# Patient Record
Sex: FEMALE | Race: WHITE | NOT HISPANIC OR LATINO | URBAN - METROPOLITAN AREA
[De-identification: names, ages, dates, MRNs, and addresses within clinical notes are randomized per-mention and may not be internally consistent; named-entity substitution may affect disease eponyms.]

---

## 2021-05-28 ENCOUNTER — INPATIENT (INPATIENT)
Facility: HOSPITAL | Age: 66
LOS: 11 days | Discharge: HOME | End: 2021-06-09
Attending: STUDENT IN AN ORGANIZED HEALTH CARE EDUCATION/TRAINING PROGRAM
Payer: MEDICARE

## 2021-05-28 VITALS
SYSTOLIC BLOOD PRESSURE: 145 MMHG | HEART RATE: 61 BPM | OXYGEN SATURATION: 100 % | TEMPERATURE: 98 F | DIASTOLIC BLOOD PRESSURE: 79 MMHG | RESPIRATION RATE: 18 BRPM

## 2021-05-28 LAB
ALBUMIN SERPL ELPH-MCNC: 3.2 G/DL — LOW (ref 3.3–5)
ALBUMIN SERPL ELPH-MCNC: 3.5 G/DL — SIGNIFICANT CHANGE UP (ref 3.3–5)
ALP SERPL-CCNC: 104 U/L — SIGNIFICANT CHANGE UP (ref 40–120)
ALP SERPL-CCNC: 95 U/L — SIGNIFICANT CHANGE UP (ref 40–120)
ALT FLD-CCNC: 37 U/L — HIGH (ref 4–33)
ALT FLD-CCNC: 42 U/L — HIGH (ref 4–33)
ANION GAP SERPL CALC-SCNC: 11 MMOL/L — SIGNIFICANT CHANGE UP (ref 7–14)
ANION GAP SERPL CALC-SCNC: 12 MMOL/L — SIGNIFICANT CHANGE UP (ref 7–14)
APPEARANCE UR: ABNORMAL
APTT BLD: 40.9 SEC — HIGH (ref 27–36.3)
AST SERPL-CCNC: 102 U/L — HIGH (ref 4–32)
AST SERPL-CCNC: 66 U/L — HIGH (ref 4–32)
BACTERIA # UR AUTO: ABNORMAL
BILIRUB DIRECT SERPL-MCNC: 1 MG/DL — HIGH (ref 0–0.2)
BILIRUB DIRECT SERPL-MCNC: 2.2 MG/DL — HIGH (ref 0–0.2)
BILIRUB SERPL-MCNC: 4 MG/DL — HIGH (ref 0.2–1.2)
BILIRUB SERPL-MCNC: 4.2 MG/DL — HIGH (ref 0.2–1.2)
BILIRUB UR-MCNC: ABNORMAL
BLD GP AB SCN SERPL QL: NEGATIVE — SIGNIFICANT CHANGE UP
BUN SERPL-MCNC: 6 MG/DL — LOW (ref 7–23)
BUN SERPL-MCNC: 6 MG/DL — LOW (ref 7–23)
CALCIUM SERPL-MCNC: 9 MG/DL — SIGNIFICANT CHANGE UP (ref 8.4–10.5)
CALCIUM SERPL-MCNC: 9.2 MG/DL — SIGNIFICANT CHANGE UP (ref 8.4–10.5)
CHLORIDE SERPL-SCNC: 101 MMOL/L — SIGNIFICANT CHANGE UP (ref 98–107)
CHLORIDE SERPL-SCNC: 102 MMOL/L — SIGNIFICANT CHANGE UP (ref 98–107)
CO2 SERPL-SCNC: 24 MMOL/L — SIGNIFICANT CHANGE UP (ref 22–31)
CO2 SERPL-SCNC: 26 MMOL/L — SIGNIFICANT CHANGE UP (ref 22–31)
COLOR SPEC: ABNORMAL
CREAT SERPL-MCNC: 0.56 MG/DL — SIGNIFICANT CHANGE UP (ref 0.5–1.3)
CREAT SERPL-MCNC: 0.59 MG/DL — SIGNIFICANT CHANGE UP (ref 0.5–1.3)
DIFF PNL FLD: NEGATIVE — SIGNIFICANT CHANGE UP
EPI CELLS # UR: 3 /HPF — SIGNIFICANT CHANGE UP (ref 0–5)
GLUCOSE SERPL-MCNC: 105 MG/DL — HIGH (ref 70–99)
GLUCOSE SERPL-MCNC: 109 MG/DL — HIGH (ref 70–99)
GLUCOSE UR QL: NEGATIVE — SIGNIFICANT CHANGE UP
HCT VFR BLD CALC: 40.2 % — SIGNIFICANT CHANGE UP (ref 34.5–45)
HGB BLD-MCNC: 13.6 G/DL — SIGNIFICANT CHANGE UP (ref 11.5–15.5)
HYALINE CASTS # UR AUTO: 3 /LPF — SIGNIFICANT CHANGE UP (ref 0–7)
INR BLD: 1.97 RATIO — HIGH (ref 0.88–1.16)
KETONES UR-MCNC: NEGATIVE — SIGNIFICANT CHANGE UP
LEUKOCYTE ESTERASE UR-ACNC: NEGATIVE — SIGNIFICANT CHANGE UP
MAGNESIUM SERPL-MCNC: 1.7 MG/DL — SIGNIFICANT CHANGE UP (ref 1.6–2.6)
MCHC RBC-ENTMCNC: 33.4 PG — SIGNIFICANT CHANGE UP (ref 27–34)
MCHC RBC-ENTMCNC: 33.8 GM/DL — SIGNIFICANT CHANGE UP (ref 32–36)
MCV RBC AUTO: 98.8 FL — SIGNIFICANT CHANGE UP (ref 80–100)
NITRITE UR-MCNC: NEGATIVE — SIGNIFICANT CHANGE UP
NRBC # BLD: 0 /100 WBCS — SIGNIFICANT CHANGE UP
NRBC # FLD: 0 K/UL — SIGNIFICANT CHANGE UP
PH UR: 6.5 — SIGNIFICANT CHANGE UP (ref 5–8)
PHOSPHATE SERPL-MCNC: 2.4 MG/DL — LOW (ref 2.5–4.5)
PLATELET # BLD AUTO: 151 K/UL — SIGNIFICANT CHANGE UP (ref 150–400)
POTASSIUM SERPL-MCNC: 3.3 MMOL/L — LOW (ref 3.5–5.3)
POTASSIUM SERPL-MCNC: 4.9 MMOL/L — SIGNIFICANT CHANGE UP (ref 3.5–5.3)
POTASSIUM SERPL-SCNC: 3.3 MMOL/L — LOW (ref 3.5–5.3)
POTASSIUM SERPL-SCNC: 4.9 MMOL/L — SIGNIFICANT CHANGE UP (ref 3.5–5.3)
PROT SERPL-MCNC: 7.5 G/DL — SIGNIFICANT CHANGE UP (ref 6–8.3)
PROT SERPL-MCNC: 8.3 G/DL — SIGNIFICANT CHANGE UP (ref 6–8.3)
PROT UR-MCNC: ABNORMAL
PROTHROM AB SERPL-ACNC: 21.9 SEC — HIGH (ref 10.6–13.6)
RBC # BLD: 4.07 M/UL — SIGNIFICANT CHANGE UP (ref 3.8–5.2)
RBC # FLD: 14.9 % — HIGH (ref 10.3–14.5)
RBC CASTS # UR COMP ASSIST: 3 /HPF — SIGNIFICANT CHANGE UP (ref 0–4)
RH IG SCN BLD-IMP: POSITIVE — SIGNIFICANT CHANGE UP
SODIUM SERPL-SCNC: 137 MMOL/L — SIGNIFICANT CHANGE UP (ref 135–145)
SODIUM SERPL-SCNC: 139 MMOL/L — SIGNIFICANT CHANGE UP (ref 135–145)
SP GR SPEC: 1.02 — SIGNIFICANT CHANGE UP (ref 1.01–1.02)
UROBILINOGEN FLD QL: ABNORMAL
WBC # BLD: 6.37 K/UL — SIGNIFICANT CHANGE UP (ref 3.8–10.5)
WBC # FLD AUTO: 6.37 K/UL — SIGNIFICANT CHANGE UP (ref 3.8–10.5)
WBC UR QL: 3 /HPF — SIGNIFICANT CHANGE UP (ref 0–5)

## 2021-05-28 PROCEDURE — 99285 EMERGENCY DEPT VISIT HI MDM: CPT

## 2021-05-28 PROCEDURE — 70496 CT ANGIOGRAPHY HEAD: CPT | Mod: 26

## 2021-05-28 PROCEDURE — 74177 CT ABD & PELVIS W/CONTRAST: CPT | Mod: 26

## 2021-05-28 PROCEDURE — 70498 CT ANGIOGRAPHY NECK: CPT | Mod: 26

## 2021-05-28 PROCEDURE — 71260 CT THORAX DX C+: CPT | Mod: 26

## 2021-05-28 PROCEDURE — 70450 CT HEAD/BRAIN W/O DYE: CPT | Mod: 26,59

## 2021-05-28 RX ORDER — LEVETIRACETAM 250 MG/1
1000 TABLET, FILM COATED ORAL ONCE
Refills: 0 | Status: COMPLETED | OUTPATIENT
Start: 2021-05-28 | End: 2021-05-28

## 2021-05-28 RX ADMIN — LEVETIRACETAM 1000 MILLIGRAM(S): 250 TABLET, FILM COATED ORAL at 20:57

## 2021-05-28 NOTE — ED PROVIDER NOTE - ATTENDING CONTRIBUTION TO CARE
margaux: pt presents with loss of vision in her right eye about 2 weeks ago, weight loss 30lbs recently, today with some ha and feels "eyes are yellow". pt denies pain in abd, nausea or vomiting.   pt awake, alert appropriate. not even seeing fingers from the right eye. abd soft.  ct shows head bleed, concern for mets, ct abd pending.  neuro involved.  will require admission.    Upon my evaluation, this patient had a high probability of imminent or life-threatening deterioration due to head bleed which required my direct attention, intervention, and personal management.  The patient has a  medical condition that impairs one or more vital organ systems.  Frequent personal assessment and adjustment of medical interventions was performed.      I have personally provided 34  minutes of critical care time exclusive of time spent on separately billable procedures. Time includes review of laboratory data, radiology results, discussion with consultants, patient and family; monitoring for potential decompensation, as well as time spent retrieving data and reviewing the chart and documenting the visit. Interventions were performed as documented above.    I analyzed the monitor at least 2 different times greater than 10 minutes apart and the rhythm was unchanged and o2 sat >92.    I performed a history and physical exam of the patient and discussed their management with the resident and /or advanced care provider. I reviewed the resident and /or ACP's note and agree with the documented findings and plan of care. My medical decison making and observations are found above.

## 2021-05-28 NOTE — ED PROVIDER NOTE - CLINICAL SUMMARY MEDICAL DECISION MAKING FREE TEXT BOX
margaux: pt presents with loss of vision in her right eye about 2 weeks ago, weight loss 30lbs recently, today with some ha and feels "eyes are yellow". pt denies pain in abd, nausea or vomiting.   pt awake, alert appropriate. not even seeing fingers from the right eye. abd soft.  ct shows head bleed, concern for mets, ct abd pending.  neuro involved.  will require admission.    Upon my evaluation, this patient had a high probability of imminent or life-threatening deterioration due to head bleed which required my direct attention, intervention, and personal management.  The patient has a  medical condition that impairs one or more vital organ systems.  Frequent personal assessment and adjustment of medical interventions was performed.      I have personally provided 34  minutes of critical care time exclusive of time spent on separately billable procedures. Time includes review of laboratory data, radiology results, discussion with consultants, patient and family; monitoring for potential decompensation, as well as time spent retrieving data and reviewing the chart and documenting the visit. Interventions were performed as documented above.    I analyzed the monitor at least 2 different times greater than 10 minutes apart and the rhythm was unchanged and o2 sat >92.

## 2021-05-28 NOTE — ED ADULT NURSE NOTE - OBJECTIVE STATEMENT
Pt awake and alert x 4 co weight loss over last few months with skin discoloration. pt s urine dark ralph ic placed blood and urine collected. pt denies any sob or chest pain. Pt also denies abdominal pain awaiting for dispo.

## 2021-05-28 NOTE — ED PROVIDER NOTE - PROGRESS NOTE DETAILS
Juan, PGY3- sign out from day team. Concern for new GI malignancy with mets to brain (call from rads, hemorrhage from possible mass). Neuro exam at this time reassuring, non focal. Pt and dghter made aware of prelim findings. NSG aware, will eval. Juan, PGY3- NSG reviewed imaging. Feel pt needs Q1 neuro checks. Dr. Han accepting to MICU given pt will need medical workup for possible metastatic malignancy. Family aware of plan.

## 2021-05-28 NOTE — ED PROVIDER NOTE - CARE PLAN
Principal Discharge DX:	Brain bleed   Principal Discharge DX:	Intracranial hemorrhage  Secondary Diagnosis:	Liver mass

## 2021-05-28 NOTE — ED ADULT TRIAGE NOTE - CHIEF COMPLAINT QUOTE
pt coming with lose of weight x 3 months with some/HA/weakness/sclera discoloration.    denies CP, no dysuria, no slur speech, no facial numbness, upper and lower ext. equal strength.

## 2021-05-28 NOTE — ED PROVIDER NOTE - PHYSICAL EXAMINATION
Gen: tired appearing  HEENT: EOMI, no nasal discharge, mucous membranes moist, patient with diminished visual field of R eye, difficulty with both peripheral and central vision. No issues with L. Sclera are discolored and yellow tinged.  CV: RRR, +S1/S2, no M/R/G  Resp: CTAB, no W/R/R  GI: Abdomen soft non-distended, NTTP, no masses  MSK: No open wounds, no bruising, no LE edema, noticeable loose skin on arms and legs  Neuro: A&Ox4, following commands, moving all four extremities spontaneously, strength 5/5 in all 4 extremities  Psych: appropriate mood, denies AH, VH, SI Gen: tired appearing  HEENT: EOMI, no nasal discharge, mucous membranes moist, patient with diminished visual field of R eye, difficulty with both peripheral and central vision. No issues with L. Sclera are discolored/yellow tinged.  CV: RRR, +S1/S2, no M/R/G  Resp: CTAB, no W/R/R  GI: Abdomen soft non-distended, NTTP, no masses  MSK: No open wounds, no bruising, no LE edema, noticeable loose skin on arms and legs  Neuro: A&Ox4, following commands, moving all four extremities spontaneously, strength 5/5 in all 4 extremities  Psych: appropriate mood, denies AH, VH, SI

## 2021-05-28 NOTE — ED PROVIDER NOTE - OBJECTIVE STATEMENT
65F no PMH Sclera in eyes changing color from white 2 weeks ago to yellow now, lost vision 2 weeks ago now back , pressure in head like slight Ha last yesterday, stomach pain diffusely, sweating profusely last night, rapid weight loss from 3 months despite normal appetite till now daughter estimates about 20 lbs loss. No  n/v/d/f/c. No hematuria, dysuria, hematochezia. Daughter endorses new onset generalized weakness and needing some assistance walking that was not present 2 weeks ago. 65F no PMH. As per daughter, sclera in eyes changing color from white 2 weeks ago to yellow now, lost vision 2 weeks ago briefly, now returned.  The patient reports a pressure headache that lasts several minutes, last felt yesterday, stomach pain diffusely that comes and goes, and sweating profusely last night. She reports rapid weight loss from 3 months despite normal appetite daughter estimates about 20-30 lbs loss. No  n/v/d/f/c. No hematuria, dysuria, hematochezia. Daughter endorses new onset generalized weakness and needing some assistance walking that was not present 2 weeks ago.

## 2021-05-28 NOTE — ED ADULT NURSE REASSESSMENT NOTE - NS ED NURSE REASSESS COMMENT FT1
Pt A&Ox4, resting in stretcher. pt denies cp, sob, abd pain, ha, dizziness, n/v/d fevers/chills. Respirations even and unlabored, no accessory muscle use. Pt awaiting ct abd

## 2021-05-29 DIAGNOSIS — Z02.9 ENCOUNTER FOR ADMINISTRATIVE EXAMINATIONS, UNSPECIFIED: ICD-10-CM

## 2021-05-29 DIAGNOSIS — I62.9 NONTRAUMATIC INTRACRANIAL HEMORRHAGE, UNSPECIFIED: ICD-10-CM

## 2021-05-29 DIAGNOSIS — F10.10 ALCOHOL ABUSE, UNCOMPLICATED: ICD-10-CM

## 2021-05-29 DIAGNOSIS — Z29.9 ENCOUNTER FOR PROPHYLACTIC MEASURES, UNSPECIFIED: ICD-10-CM

## 2021-05-29 DIAGNOSIS — R16.0 HEPATOMEGALY, NOT ELSEWHERE CLASSIFIED: ICD-10-CM

## 2021-05-29 LAB
ALBUMIN SERPL ELPH-MCNC: 3.2 G/DL — LOW (ref 3.3–5)
ALP SERPL-CCNC: 95 U/L — SIGNIFICANT CHANGE UP (ref 40–120)
ALT FLD-CCNC: 32 U/L — SIGNIFICANT CHANGE UP (ref 4–33)
ANION GAP SERPL CALC-SCNC: 14 MMOL/L — SIGNIFICANT CHANGE UP (ref 7–14)
AST SERPL-CCNC: 58 U/L — HIGH (ref 4–32)
BASOPHILS # BLD AUTO: 0.03 K/UL — SIGNIFICANT CHANGE UP (ref 0–0.2)
BASOPHILS NFR BLD AUTO: 0.5 % — SIGNIFICANT CHANGE UP (ref 0–2)
BASOPHILS NFR BLD AUTO: 1 % — SIGNIFICANT CHANGE UP (ref 0–2)
BILIRUB DIRECT SERPL-MCNC: 2.3 MG/DL — HIGH (ref 0–0.2)
BILIRUB SERPL-MCNC: 4.3 MG/DL — HIGH (ref 0.2–1.2)
BUN SERPL-MCNC: 5 MG/DL — LOW (ref 7–23)
CALCIUM SERPL-MCNC: 8.9 MG/DL — SIGNIFICANT CHANGE UP (ref 8.4–10.5)
CHLORIDE SERPL-SCNC: 102 MMOL/L — SIGNIFICANT CHANGE UP (ref 98–107)
CO2 SERPL-SCNC: 22 MMOL/L — SIGNIFICANT CHANGE UP (ref 22–31)
CREAT SERPL-MCNC: 0.59 MG/DL — SIGNIFICANT CHANGE UP (ref 0.5–1.3)
EOSINOPHIL # BLD AUTO: 0.13 K/UL — SIGNIFICANT CHANGE UP (ref 0–0.5)
EOSINOPHIL NFR BLD AUTO: 2 % — SIGNIFICANT CHANGE UP (ref 0–6)
EOSINOPHIL NFR BLD AUTO: 2.1 % — SIGNIFICANT CHANGE UP (ref 0–6)
GLUCOSE SERPL-MCNC: 119 MG/DL — HIGH (ref 70–99)
HCT VFR BLD CALC: 39.9 % — SIGNIFICANT CHANGE UP (ref 34.5–45)
HGB BLD-MCNC: 13.7 G/DL — SIGNIFICANT CHANGE UP (ref 11.5–15.5)
IANC: 3.65 K/UL — SIGNIFICANT CHANGE UP (ref 1.5–8.5)
IMM GRANULOCYTES NFR BLD AUTO: 0.3 % — SIGNIFICANT CHANGE UP (ref 0–1.5)
INR BLD: 1.88 RATIO — HIGH (ref 0.88–1.16)
LYMPHOCYTES # BLD AUTO: 1.74 K/UL — SIGNIFICANT CHANGE UP (ref 1–3.3)
LYMPHOCYTES # BLD AUTO: 28 % — SIGNIFICANT CHANGE UP (ref 13–44)
LYMPHOCYTES # BLD AUTO: 28.3 % — SIGNIFICANT CHANGE UP (ref 13–44)
MACROCYTES BLD QL: SLIGHT — SIGNIFICANT CHANGE UP
MAGNESIUM SERPL-MCNC: 1.7 MG/DL — SIGNIFICANT CHANGE UP (ref 1.6–2.6)
MCHC RBC-ENTMCNC: 33.2 PG — SIGNIFICANT CHANGE UP (ref 27–34)
MCHC RBC-ENTMCNC: 34.3 GM/DL — SIGNIFICANT CHANGE UP (ref 32–36)
MCV RBC AUTO: 96.6 FL — SIGNIFICANT CHANGE UP (ref 80–100)
MONOCYTES # BLD AUTO: 0.58 K/UL — SIGNIFICANT CHANGE UP (ref 0–0.9)
MONOCYTES NFR BLD AUTO: 9 % — SIGNIFICANT CHANGE UP (ref 2–14)
MONOCYTES NFR BLD AUTO: 9.4 % — SIGNIFICANT CHANGE UP (ref 2–14)
NEUTROPHILS # BLD AUTO: 3.65 K/UL — SIGNIFICANT CHANGE UP (ref 1.8–7.4)
NEUTROPHILS NFR BLD AUTO: 59 % — SIGNIFICANT CHANGE UP (ref 43–77)
NEUTROPHILS NFR BLD AUTO: 59.4 % — SIGNIFICANT CHANGE UP (ref 43–77)
NRBC # BLD: 0 /100 WBCS — SIGNIFICANT CHANGE UP
NRBC # FLD: 0 K/UL — SIGNIFICANT CHANGE UP
PHOSPHATE SERPL-MCNC: 2.8 MG/DL — SIGNIFICANT CHANGE UP (ref 2.5–4.5)
PLAT MORPH BLD: NORMAL — SIGNIFICANT CHANGE UP
PLATELET # BLD AUTO: 145 K/UL — LOW (ref 150–400)
PLATELET CLUMP BLD QL SMEAR: ABNORMAL
POTASSIUM SERPL-MCNC: 3 MMOL/L — LOW (ref 3.5–5.3)
POTASSIUM SERPL-SCNC: 3 MMOL/L — LOW (ref 3.5–5.3)
PROT SERPL-MCNC: 7.2 G/DL — SIGNIFICANT CHANGE UP (ref 6–8.3)
PROTHROM AB SERPL-ACNC: 20.8 SEC — HIGH (ref 10.6–13.6)
RBC # BLD: 4.13 M/UL — SIGNIFICANT CHANGE UP (ref 3.8–5.2)
RBC # FLD: 14.8 % — HIGH (ref 10.3–14.5)
RBC BLD AUTO: NORMAL — SIGNIFICANT CHANGE UP
RH IG SCN BLD-IMP: POSITIVE — SIGNIFICANT CHANGE UP
SARS-COV-2 RNA SPEC QL NAA+PROBE: SIGNIFICANT CHANGE UP
SODIUM SERPL-SCNC: 138 MMOL/L — SIGNIFICANT CHANGE UP (ref 135–145)
VARIANT LYMPHS # BLD: 1 % — SIGNIFICANT CHANGE UP (ref 0–6)
WBC # BLD: 6.15 K/UL — SIGNIFICANT CHANGE UP (ref 3.8–10.5)
WBC # FLD AUTO: 6.15 K/UL — SIGNIFICANT CHANGE UP (ref 3.8–10.5)

## 2021-05-29 PROCEDURE — 99233 SBSQ HOSP IP/OBS HIGH 50: CPT | Mod: GC

## 2021-05-29 PROCEDURE — 70450 CT HEAD/BRAIN W/O DYE: CPT | Mod: 26

## 2021-05-29 RX ORDER — CHLORHEXIDINE GLUCONATE 213 G/1000ML
1 SOLUTION TOPICAL
Refills: 0 | Status: DISCONTINUED | OUTPATIENT
Start: 2021-05-29 | End: 2021-05-29

## 2021-05-29 RX ORDER — LEVETIRACETAM 250 MG/1
500 TABLET, FILM COATED ORAL
Refills: 0 | Status: DISCONTINUED | OUTPATIENT
Start: 2021-05-29 | End: 2021-05-31

## 2021-05-29 RX ORDER — POTASSIUM CHLORIDE 20 MEQ
40 PACKET (EA) ORAL EVERY 4 HOURS
Refills: 0 | Status: COMPLETED | OUTPATIENT
Start: 2021-05-29 | End: 2021-05-29

## 2021-05-29 RX ORDER — MAGNESIUM SULFATE 500 MG/ML
2 VIAL (ML) INJECTION ONCE
Refills: 0 | Status: COMPLETED | OUTPATIENT
Start: 2021-05-29 | End: 2021-05-29

## 2021-05-29 RX ORDER — FOLIC ACID 0.8 MG
1 TABLET ORAL DAILY
Refills: 0 | Status: DISCONTINUED | OUTPATIENT
Start: 2021-05-29 | End: 2021-06-09

## 2021-05-29 RX ORDER — PHYTONADIONE (VIT K1) 5 MG
10 TABLET ORAL ONCE
Refills: 0 | Status: COMPLETED | OUTPATIENT
Start: 2021-05-29 | End: 2021-05-29

## 2021-05-29 RX ORDER — THIAMINE MONONITRATE (VIT B1) 100 MG
100 TABLET ORAL DAILY
Refills: 0 | Status: COMPLETED | OUTPATIENT
Start: 2021-05-29 | End: 2021-06-01

## 2021-05-29 RX ADMIN — CHLORHEXIDINE GLUCONATE 1 APPLICATION(S): 213 SOLUTION TOPICAL at 08:04

## 2021-05-29 RX ADMIN — Medication 102 MILLIGRAM(S): at 04:48

## 2021-05-29 RX ADMIN — Medication 100 MILLIGRAM(S): at 17:40

## 2021-05-29 RX ADMIN — Medication 1 TABLET(S): at 17:39

## 2021-05-29 RX ADMIN — LEVETIRACETAM 500 MILLIGRAM(S): 250 TABLET, FILM COATED ORAL at 06:16

## 2021-05-29 RX ADMIN — LEVETIRACETAM 500 MILLIGRAM(S): 250 TABLET, FILM COATED ORAL at 17:39

## 2021-05-29 RX ADMIN — Medication 1 MILLIGRAM(S): at 17:39

## 2021-05-29 RX ADMIN — Medication 40 MILLIEQUIVALENT(S): at 05:45

## 2021-05-29 RX ADMIN — Medication 50 GRAM(S): at 05:45

## 2021-05-29 RX ADMIN — Medication 40 MILLIEQUIVALENT(S): at 10:26

## 2021-05-29 NOTE — H&P ADULT - NSHPREVIEWOFSYSTEMS_GEN_ALL_CORE
Constitutional: denies fevers, chills, +night sweats, +weight loss, +generalized weakness   HEENT: +decreased/blurry vision, +sclera yellowing   Cardiovascular: denies palpitations, chest pain, edema  Respiratory: denies SOB, wheezing  Gastrointestinal: denies N/V/D, hematochezia, melena, +abdominal pain  : denies dysuria, hematuria  MSK: denies weakness, joint pain  Neuro: no numbness or tingling

## 2021-05-29 NOTE — PROGRESS NOTE ADULT - PROBLEM SELECTOR PLAN 3
Empirically on CIWA: patient denied heavy ETOH use, but daughter reports patient is drinking heavily.

## 2021-05-29 NOTE — PROGRESS NOTE ADULT - ASSESSMENT
65F with no known PMH who presented to the ED with headache and vision changes. Patient found to have an intraparenchymal hemorrhage on CTH and indeterminate infiltrative liver mass concerning for metastatic neoplasm.      Neuro  #Patient with 2.4 x 3.0 x 2.4 cm left occipital acute intraparenchymal hemorrhage with associated vasogenic edema and mass effect, no midline shift. CTA head/neck 5 hrs later with unchanged left occipital acute intraparenchymal hemorrhage. No active bleeding  - Patient evaluated by neurosurgery, recommended Q1hr neuro checks, no surgical intervention at this time   - INR 2 (likely iso liver dysfunction), started on vitamin K  - s/p Keppra 1g in the ED; c/w Keppra 500mg BID  - Repeat CTH 10-12 hrs after original   - neuro consult  - MRI brain wwo C for stroke workup and rule out underlying lesion    Cards/Vasc  -No active issues  -monitor BP     Resp  -No active issues    GI  #CTAP with Indeterminate infiltrative liver mass; concerning for malignancy  - h/o alcohol use  - Liver dysfunction with INR 2, albumin 3.2 and mildly elevated Tbili (4)  - obtain MRI for further eval  - will likely need liver biopsy    Renal/  -Cr 0.56 on admission.    Endo  -no issues    ID  -COVID neg    Heme/Onc  -Hgb 13.6 on admission    Nutrition:  Regular  DVT ppx: SCD  GI ppx: n/a  Dispo: Observe in MICU  65F with no known PMH who presented to the ED with headache and vision changes. Patient found to have an intraparenchymal hemorrhage on CTH and indeterminate infiltrative liver mass concerning for metastatic neoplasm. Admitted for Q1h neuro check.       Neuro  #Patient with 2.4 x 3.0 x 2.4 cm left occipital acute intraparenchymal hemorrhage with associated vasogenic edema and mass effect, no midline shift. CTA head/neck 5 hrs later with unchanged left occipital acute intraparenchymal hemorrhage. No active bleeding  - Patient evaluated by neurosurgery, recommended Q1hr neuro checks, no surgical intervention at this time   - INR ~2 (likely iso liver dysfunction), started on vitamin K  - s/p Keppra 1g in the ED; c/w Keppra 500mg BID  - Repeat CTH stable in 9AM  - Neuro following, will determine the need for steroid after reviewing repeat CTH  - MRI brain wwo C for stroke workup and rule out underlying lesion    Cards/Vasc  -No active issues  -monitor BP     Resp  -No active issues    GI  #CTAP with Indeterminate infiltrative liver mass; concerning for malignancy  - h/o alcohol use, no signs of withdrawal currently  - Liver dysfunction with INR 2, albumin 3.2 and mildly elevated Tbili (4)  - obtain MRI for further eval  - will likely need liver biopsy  - Diet as tolerated    Renal/  - Cr 0.56 on admission.  - Monitor UOP    Endo  -no issues    ID  - COVID neg  - Observe off abx    Heme/Onc  - Hgb 13.6 on admission  - Cancer hx: no personal hx of cancers; 2 sisters passed with gastric cancer, daughter has breast cancer  - Cancer screening:   -- Mammo and US wnl last year, but has had multiple workup/biopsies in the past for abnormal findings on mammo (all benign findings);   -- Last colonoscopy 3 years ago, normal finding per report  -- Never had EGD  -- PAP smear last year, reportedly normal  - CT revealed pulmonary nodules, liver mass, and possible hemorrhagic brain mets; Will need malignancy workup.    Nutrition:  Regular  DVT ppx: SCD  GI ppx: n/a  Dispo: BB to medicine if repeat CTH stable

## 2021-05-29 NOTE — CONSULT NOTE ADULT - SUBJECTIVE AND OBJECTIVE BOX
MRN-[id number]  Patient is a 65y old  Female who presents with a chief complaint of Intraparenchymal Hemorrhage ICH (29 May 2021 02:14)    HPI:  Patient is a 66yo RT handed with no known pmh presents to [facility] with headaches and vision changes. Per chart review, patient was noted to have decreased vision and also noted to have blurred vision around 2 weeks ago. Of note, patient's vision improved over the course of the 2 weeks, however still endorses some blurred vision. Patient's sclera was noted to change from white to yellow. Patient stated having headaches for the past 5-7 days, mainly in the frontal and 5-10 in intensity.    Patient also reported having diffuse stomach pains. Of note she reported having a 20-30lb weight loss in the past 3 months; more rapidly in the past few weeks. Also with generalized weakness, needing assistance with ambulation intermittently for the past two weeks. Denied fevers, chills but endorsed night sweats. Also denied nausea, vomiting, diarrhea, constipation.     PAST MEDICAL & SURGICAL HISTORY:  No pertinent past medical history    No significant past surgical history      FAMILY HISTORY:  No pertinent family history in first degree relatives      Social Hx:  Nonsmoker, no drug or alcohol use    Home Medications:    MEDICATIONS  (STANDING):  chlorhexidine 4% Liquid 1 Application(s) Topical <User Schedule>  levETIRAcetam 500 milliGRAM(s) Oral two times a day  phytonadione  IVPB 10 milliGRAM(s) IV Intermittent once    MEDICATIONS  (PRN):    Allergies  Allergy Status Unknown    Intolerances      REVIEW OF SYSTEMS  General:	  Skin/Breast:	  Ophthalmologic:  ENMT:	  Respiratory and Thorax:	  Cardiovascular:	  Gastrointestinal:	  Genitourinary:	  Musculoskeletal:	  Neurological:	  	    ROS: Pertinent positives in HPI, all other ROS were reviewed and are negative.      Vital Signs Last 24 Hrs  T(C): 36.8 (29 May 2021 02:43), Max: 37.1 (29 May 2021 02:10)  T(F): 98.2 (29 May 2021 02:43), Max: 98.8 (29 May 2021 02:10)  HR: 73 (29 May 2021 04:00) (61 - 75)  BP: 116/55 (29 May 2021 04:00) (116/55 - 145/79)  BP(mean): 74 (29 May 2021 04:00) (74 - 96)  RR: 19 (29 May 2021 04:00) (17 - 19)  SpO2: 97% (29 May 2021 04:00) (95% - 100%)    GENERAL EXAM:  Constitutional: awake and alert. NAD  HEENT: PERRL, EOMI      NEUROLOGICAL EXAM:  MS: AAOX3, speech is fluent, follows simple and complex commands.CN: VFF, EOMI, PERRL, no SELVIN, no APD,  V1-3 intact, no facial asymmetry, t/p midline, SCM/trap intact.  Motor: Strength: 5/5 4x. Tone: normal.   Bulk: normal. DTR 2+ symm.    Plantar flex b/l.   Sensation: intact to LT/PP/Vibration/Position/Temperature 4x.   Coordination: intact 4x.   Gait:  Romberg negative, pull test negative; walks with narrow base, pivots in 2 steps.    NIHSS  mRS    Labs:   cbc                      13.7   6.15  )-----------( 145      ( 29 May 2021 04:24 )             39.9     [id number]    139  |  102  |  6<L>  ----------------------------<  109<H>  3.3<L>   |  26  |  0.59    Ca    9.0      28 May 2021 19:45  Phos  2.4       Mg     1.7         TPro  7.5  /  Alb  3.2<L>  /  TBili  4.2<H>  /  DBili  2.2<H>  /  AST  66<H>  /  ALT  37<H>  /  AlkPhos  104      CoagsPT/INR - ( 28 May 2021 21:00 )   PT: 21.9 sec;   INR: 1.97 ratio         PTT - ( 28 May 2021 21:00 )  PTT:40.9 sec  Lipids  A1C  CardiacMarkers    LFTsLIVER FUNCTIONS - ( 28 May 2021 19:45 )  Alb: 3.2 g/dL / Pro: 7.5 g/dL / ALK PHOS: 104 U/L / ALT: 37 U/L / AST: 66 U/L / GGT: x           UAUrinalysis Basic - ( 28 May 2021 17:53 )    Color: Terese / Appearance: Slightly Turbid / S.023 / pH: x  Gluc: x / Ketone: Negative  / Bili: Small / Urobili: 12 mg/dL   Blood: x / Protein: 30 mg/dL / Nitrite: Negative   Leuk Esterase: Negative / RBC: 3 /HPF / WBC 3 /HPF   Sq Epi: x / Non Sq Epi: 3 /HPF / Bacteria: Few      Radiology:  CT head w/o contrast (20:00): IMPRESSION:   2.4 x 3.0 x 2.4 cm left occipital acute intraparenchymal hemorrhage with associated vasogenic edema and mass effect as described. There is no midline shift.    Repeat CT head w/o contrast ():   CT brain: Unchanged left occipital acute intraparenchymal hemorrhage. No active bleeding.    CT angiography neck: No hemodynamically significant stenosis by NASCET criteria. No vascular dissection.    CT angiography brain: No major vessel occlusion or proximal stenosis. No aneurysm or vascular malformation.   MRN-[id number]  Patient is a 65y old  Female who presents with a chief complaint of Intraparenchymal Hemorrhage ICH (29 May 2021 02:14)    HPI:  Patient is a 64yo RT handed with no known pmh presents to [facility] with headaches and vision changes. Per chart review, patient was noted to have decreased vision and also noted to have blurred vision around 2 weeks ago. Of note, patient's vision improved over the course of the 2 weeks, however still endorses some blurred vision. Patient's sclera was noted to change from white to yellow. Patient stated having headaches for the past 5-7 days, mainly in the frontal and 5-10 in intensity. Patient also reported having diffuse stomach pains. Of note she reported having a 20-30lb weight loss in the past 3 months; more rapidly in the past few weeks. Also with generalized weakness, needing assistance with ambulation intermittently for the past two weeks. Patient denies headaches, numbness, tingling, fevers, chills, nausea, vomiting, diarrhea, constipation.  Mentions night sweats and fatigue.     PAST MEDICAL & SURGICAL HISTORY:  No pertinent past medical history    No significant past surgical history      FAMILY HISTORY:  No pertinent family history in first degree relatives      Social Hx:  Nonsmoker, no drug or alcohol use    Home Medications:    MEDICATIONS  (STANDING):  chlorhexidine 4% Liquid 1 Application(s) Topical <User Schedule>  levETIRAcetam 500 milliGRAM(s) Oral two times a day  phytonadione  IVPB 10 milliGRAM(s) IV Intermittent once    MEDICATIONS  (PRN):    Allergies  Allergy Status Unknown    Intolerances      REVIEW OF SYSTEMS  General: Denies fever and chilla. mentions Night sweats and fatigue 	  Ophthalmologic: pupils symmetric b/l. EOMI. yellow sclera noted b/l. 	  Gastrointestinal:	Denies n,v   Musculoskeletal:	Denies weakness  Neurological:	Denies headaches now.   	    ROS: Pertinent positives in HPI, all other ROS were reviewed and are negative.      Vital Signs Last 24 Hrs  T(C): 36.8 (29 May 2021 02:43), Max: 37.1 (29 May 2021 02:10)  T(F): 98.2 (29 May 2021 02:43), Max: 98.8 (29 May 2021 02:10)  HR: 73 (29 May 2021 04:00) (61 - 75)  BP: 116/55 (29 May 2021 04:00) (116/55 - 145/79)  BP(mean): 74 (29 May 2021 04:00) (74 - 96)  RR: 19 (29 May 2021 04:00) (17 - 19)  SpO2: 97% (29 May 2021 04:00) (95% - 100%)    GENERAL EXAM:  Constitutional: awake and alert. NAD  HEENT: PERRL, EOMI      NEUROLOGICAL EXAM:  MS: AAOX3, speech is fluent, follows simple and complex commands. No aphasia. No dysarthria noted. Extinction wnl.   CN: VFF are intact patient was able to appreciate all quadrants, EOMI, PERRL, pupils symmetric b/l   V1-3 intact, no facial asymmetry, t/p midline,   Motor: Strength: 5/5 in the UE ane LE b/l.   Tone: normal.   Bulk: normal.   DTR 2+ symm.  in biceps/triceps. +1 noted in patellar b/l.   Plantar flex b/l.   Sensation: intact to LT/Temperature 4x.   Coordination: FTN intact b/l. Heel to shin intact b/l.   Gait:  deferred   Babinski negative b/l.     NIHSS 0  mRS 0    Labs:   cbc                      13.7   6.15  )-----------( 145      ( 29 May 2021 04:24 )             39.9     [id number]    139  |  102  |  6<L>  ----------------------------<  109<H>  3.3<L>   |  26  |  0.59    Ca    9.0      28 May 2021 19:45  Phos  2.4     05-  Mg     1.7     -    TPro  7.5  /  Alb  3.2<L>  /  TBili  4.2<H>  /  DBili  2.2<H>  /  AST  66<H>  /  ALT  37<H>  /  AlkPhos  104  05-28    CoagsPT/INR - ( 28 May 2021 21:00 )   PT: 21.9 sec;   INR: 1.97 ratio         PTT - ( 28 May 2021 21:00 )  PTT:40.9 sec  Lipids  A1C  CardiacMarkers    LFTsLIVER FUNCTIONS - ( 28 May 2021 19:45 )  Alb: 3.2 g/dL / Pro: 7.5 g/dL / ALK PHOS: 104 U/L / ALT: 37 U/L / AST: 66 U/L / GGT: x           UAUrinalysis Basic - ( 28 May 2021 17:53 )    Color: Terese / Appearance: Slightly Turbid / S.023 / pH: x  Gluc: x / Ketone: Negative  / Bili: Small / Urobili: 12 mg/dL   Blood: x / Protein: 30 mg/dL / Nitrite: Negative   Leuk Esterase: Negative / RBC: 3 /HPF / WBC 3 /HPF   Sq Epi: x / Non Sq Epi: 3 /HPF / Bacteria: Few      Radiology:  CT head w/o contrast (20:00): IMPRESSION:   2.4 x 3.0 x 2.4 cm left occipital acute intraparenchymal hemorrhage with associated vasogenic edema and mass effect as described. There is no midline shift.    Repeat CT head w/o contrast ():   CT brain: Unchanged left occipital acute intraparenchymal hemorrhage. No active bleeding.    CT angiography neck: No hemodynamically significant stenosis by NASCET criteria. No vascular dissection.    CT angiography brain: No major vessel occlusion or proximal stenosis. No aneurysm or vascular malformation.

## 2021-05-29 NOTE — CONSULT NOTE ADULT - ASSESSMENT
Patient is a 66yo RT handed with no known pmh presents to [facility] with headaches and vision changes. Per chart review, patient was noted to have decreased vision and also noted to have blurred vision around 2 weeks ago. Of note, patient's vision improved over the course of the 2 weeks, however still endorses some blurred vision. Patient's sclera was noted to change from white to yellow. Patient stated having headaches for the past 5-7 days, mainly in the frontal and 5-10 in intensity.    Patient also reported having diffuse stomach pains. Of note she reported having a 20-30lb weight loss in the past 3 months; more rapidly in the past few weeks. Patient will need further imaging with CT head in the AM for stability and will need Brain MRI w/wo contrast to assess for any possibility of mets. May consider heme/onc consult.       Impression/plan:   Acute Left sided IPH etiology unknown in the setting of active weight loss concerning for possible mets   Recommendations:   Keep SBP less than 160   repeat CT head w/o contrast in the AM for stability   Brain MRI w/wo contrast to assess for possible mets given clinical history   Continue Keppra 500mg Q12hr per NSG  rEEG   started on vitamin K for reversal   Keep Na 140-150   Neuro checks Q1hr   Telemetry   echo   NsG following   Could consider Heme/onc consult   No AC/AP till stability scan   mechanical DVT ppx     Case to be discussed with Neurology Attending.  Patient is a 64yo RT handed with no known pmh presents to [facility] with headaches and vision changes. Per chart review, patient was noted to have decreased vision and also noted to have blurred vision around 2 weeks ago. Of note, patient's vision improved over the course of the 2 weeks, however still endorses some blurred vision. Patient's sclera was noted to change from white to yellow. Patient stated having headaches for the past 5-7 days, mainly in the frontal and 5-10 in intensity.    Patient also reported having diffuse stomach pains. Of note she reported having a 20-30lb weight loss in the past 3 months; more rapidly in the past few weeks. Patient will need further imaging with CT head in the AM for stability and will need Brain MRI w/wo contrast to assess for any possibility of mets. May consider heme/onc consult.       Impression/plan:   Acute Left sided IPH etiology unknown in the setting of active weight loss concerning for possible mets   Recommendations:   Keep SBP less than 160   repeat CT head w/o contrast in the AM for stability   Brain MRI w/wo contrast to assess for possible mets given clinical history   Continue Keppra 500mg Q12hr per NSG  rEEG   started on vitamin K for reversal per primary team and NSG  Keep Na 140-150   Neuro checks Q1hr   Telemetry   echo   NsG following   Could consider Heme/onc consult   No AC/AP till stability scan   mechanical DVT ppx     Case to be discussed with Neurology Attending.

## 2021-05-29 NOTE — PROGRESS NOTE ADULT - ATTENDING COMMENTS
65 F here with headache and vision changed. Has intraparenchymal bleed on CTH. Also has mass in liver. Admitted to ICU for Q1H neurochecks. Repeat CTH stable. Eligible for transfer to medicine for further malignancy work up.

## 2021-05-29 NOTE — H&P ADULT - NSHPSOCIALHISTORY_GEN_ALL_CORE
Never smoker  Heavy alcohol user (drinks 1 gallon of wine over three to four days; last drink 2 weeks ago)  Denied illicit drug use

## 2021-05-29 NOTE — CHART NOTE - NSCHARTNOTEFT_GEN_A_CORE
MAR Accept Note  Transfer to:  Medicine  Accepting Attending Physician:  Dr. Alejandre   Assigned Room:  N 72B     Patient seen and examined.   Labs and data reviewed.   No findings precluding transfer of service.       HPI/MICU COURSE:   Please refer to MICU transfer note for full details. Briefly, this is a 65F with no known PMH who presented to the ED with headache and vision changes. Patient found to have an intraparenchymal hemorrhage on CTH and indeterminate infiltrative liver mass concerning for metastatic neoplasm. Admitted for Q1h neuro check on 5/28, no focal deficit on physical exam, pending MRI head and abdomen, and malignancy workup     FOR FOLLOW-UP:  [ ] F/u MR head and abdomen  [ ] F/u vEEG  [ ] Keep SBP < 160  [ ] F/u TTE w/ bubble study  [ ] Malignancy workup  [ ] F/u neuro and neuro sx rec  [ ] Currently not on steroids; neuro to determine after reviewing on the repeat CTH  [ ] Empirically on CIWA: patient denied heavy ETOH use, but daughter reports patient is drinking    Can Hu  [id number]

## 2021-05-29 NOTE — CHART NOTE - NSCHARTNOTEFT_GEN_A_CORE
MICU Transfer Note    Transfer from: MICU  Transfer to:  (  ) Medicine    ( X ) Telemetry    (  ) RCU    (  ) Palliative    (  ) Stroke Unit    (  ) _______________  Accepting physician:  BELLE    HPI:  65F with no known PMH who presented to the ED with headache and vision changes. Patient found to have an intraparenchymal hemorrhage on CTH and indeterminate infiltrative liver mass concerning for metastatic neoplasm. Admitted for Q1h neuro check.         MICU COURSE:  Patient was found to have 2.4 x 3.0 x 2.4 cm left occipital acute intraparenchymal hemorrhage with associated vasogenic edema and mass effect, no midline shift. CTA head/neck 5 hrs later with unchanged left occipital acute intraparenchymal hemorrhage. No active bleeding. Repeat CTH in 9AM was stable as well. Neuro and neurosx following, and patient was started on Keppra, as well as vitamin K i/s/o elevated INR due to liver dysfunction. CTAP with Indeterminate infiltrative liver mass and multiple small pulmonary nodules; concerning for malignancy. MR head and abdomen was ordered to further assess for bleeding as well as malignancy workup.    Per patient:  - Cancer hx: no personal hx of cancers; 2 sisters passed with gastric cancer, daughter has breast cancer  - Cancer screening:   -- Mammo and US wnl last year, but has had multiple workup/biopsies in the past for abnormal findings on mammo (all benign findings);   -- Last colonoscopy 3 years ago, normal finding per report  -- Never had EGD  -- PAP smear last year, reportedly normal    Patient was HD stable with stable neuro exam over 24 hours. Neuro checks downgraded to q4h, and stable to be transferred to tele unit.       For Follow-Up:  [ ] F/u MR head and abdomen  [ ] F/u TTE w/ bubble study  [ ] Malignancy workup  [ ] F/u neuro and neuro sx rec  [ ] Currently not on steroids; neuro to determine after reviewing on the repeat CTH  [ ] Empirically on CIWA: patient denied heavy ETOH use, but daughter reports patient is drinking heavily.     ASSESSMENT & PLAN:     Neuro  #Patient with 2.4 x 3.0 x 2.4 cm left occipital acute intraparenchymal hemorrhage with associated vasogenic edema and mass effect, no midline shift. CTA head/neck 5 hrs later with unchanged left occipital acute intraparenchymal hemorrhage. No active bleeding  - Patient evaluated by neurosurgery, recommended Q1hr neuro checks, no surgical intervention at this time   - INR ~2 (likely iso liver dysfunction), started on vitamin K  - s/p Keppra 1g in the ED; c/w Keppra 500mg BID  - Repeat CTH stable in 9AM  - Neuro following, will determine the need for steroid after reviewing repeat CTH  - MRI brain wwo C for stroke workup and rule out underlying lesion    Cards/Vasc  -No active issues  -monitor BP     Resp  -No active issues    GI  #CTAP with Indeterminate infiltrative liver mass; concerning for malignancy  - h/o alcohol use, no signs of withdrawal currently  - Liver dysfunction with INR 2, albumin 3.2 and mildly elevated Tbili (4)  - obtain MRI for further eval  - will likely need liver biopsy  - Diet as tolerated    Renal/  - Cr 0.56 on admission.  - Monitor UOP    Endo  -no issues    ID  - COVID neg  - Observe off abx    Heme/Onc  - Hgb 13.6 on admission  - Cancer hx: no personal hx of cancers; 2 sisters passed with gastric cancer, daughter has breast cancer  - Cancer screening:   -- Mammo and US wnl last year, but has had multiple workup/biopsies in the past for abnormal findings on mammo (all benign findings);   -- Last colonoscopy 3 years ago, normal finding per report  -- Never had EGD  -- PAP smear last year, reportedly normal  - CT revealed pulmonary nodules, liver mass, and possible hemorrhagic brain mets; Will need malignancy workup.    Nutrition:  Regular  DVT ppx: SCD  GI ppx: n/a  Dispo: BB to medicine if repeat CTH stable    Vital Signs Last 24 Hrs  T(C): 36.6 (29 May 2021 08:00), Max: 37.1 (29 May 2021 02:10)  T(F): 97.8 (29 May 2021 08:00), Max: 98.8 (29 May 2021 02:10)  HR: 73 (29 May 2021 13:00) (67 - 75)  BP: 128/70 (29 May 2021 13:00) (98/70 - 143/70)  BP(mean): 84 (29 May 2021 13:00) (74 - 96)  RR: 21 (29 May 2021 13:00) (14 - 26)  SpO2: 97% (29 May 2021 13:00) (95% - 100%)  I&O's Summary    28 May 2021 07:01  -  29 May 2021 07:00  --------------------------------------------------------  IN: 970 mL / OUT: 300 mL / NET: 670 mL          MEDICATIONS  (STANDING):  chlorhexidine 4% Liquid 1 Application(s) Topical <User Schedule>  levETIRAcetam 500 milliGRAM(s) Oral two times a day    MEDICATIONS  (PRN):        LABS                                            13.7                  Neurophils% (auto):   59.4   (05-29 @ 04:24):    6.15 )-----------(145          Lymphocytes% (auto):  28.3                                          39.9                   Eosinphils% (auto):   2.1      Manual%: Neutrophils x    ; Lymphocytes x    ; Eosinophils x    ; Bands%: x    ; Blasts x                                    138    |  102    |  5                   Calcium: 8.9   / iCa: x      (05-29 @ 04:24)    ----------------------------<  119       Magnesium: 1.7                              3.0     |  22     |  0.59             Phosphorous: 2.8      TPro  x      /  Alb  x      /  TBili  x      /  DBili  2.3    /  AST  x      /  ALT  x      /  AlkPhos  x      29 May 2021 04:29    ( 05-29 @ 04:24 )   PT: 20.8 sec;   INR: 1.88 ratio  aPTT: x MICU Transfer Note    Transfer from: MICU  Transfer to:  (  ) Medicine    ( X ) Telemetry    (  ) RCU    (  ) Palliative    (  ) Stroke Unit    (  ) _______________  Accepting physician: Dr. Jeff Alejandre  [id number]    HPI:  65F with no known PMH who presented to the ED with headache and vision changes. Patient found to have an intraparenchymal hemorrhage on CTH and indeterminate infiltrative liver mass concerning for metastatic neoplasm. Admitted for Q1h neuro check.         MICU COURSE:  Patient was found to have 2.4 x 3.0 x 2.4 cm left occipital acute intraparenchymal hemorrhage with associated vasogenic edema and mass effect, no midline shift. CTA head/neck 5 hrs later with unchanged left occipital acute intraparenchymal hemorrhage. No active bleeding. Repeat CTH in 9AM was stable as well. Neuro and neurosx following, and patient was started on Keppra, as well as vitamin K i/s/o elevated INR due to liver dysfunction. CTAP with Indeterminate infiltrative liver mass and multiple small pulmonary nodules; concerning for malignancy. MR head and abdomen was ordered to further assess for bleeding as well as malignancy workup.    Per patient:  - Cancer hx: no personal hx of cancers; 2 sisters passed with gastric cancer, daughter has breast cancer  - Cancer screening:   -- Mammo and US wnl last year, but has had multiple workup/biopsies in the past for abnormal findings on mammo (all benign findings);   -- Last colonoscopy 3 years ago, normal finding per report  -- Never had EGD  -- PAP smear last year, reportedly normal    Patient was HD stable with stable neuro exam over 24 hours. Neuro checks downgraded to q4h, and stable to be transferred to tele unit.       For Follow-Up:  [ ] F/u MR head and abdomen  [ ] F/u TTE w/ bubble study  [ ] Malignancy workup  [ ] F/u neuro and neuro sx rec  [ ] Currently not on steroids; neuro to determine after reviewing on the repeat CTH  [ ] Empirically on CIWA: patient denied heavy ETOH use, but daughter reports patient is drinking heavily.     ASSESSMENT & PLAN:     Neuro  #Patient with 2.4 x 3.0 x 2.4 cm left occipital acute intraparenchymal hemorrhage with associated vasogenic edema and mass effect, no midline shift. CTA head/neck 5 hrs later with unchanged left occipital acute intraparenchymal hemorrhage. No active bleeding  - Patient evaluated by neurosurgery, recommended Q1hr neuro checks, no surgical intervention at this time   - INR ~2 (likely iso liver dysfunction), started on vitamin K  - s/p Keppra 1g in the ED; c/w Keppra 500mg BID  - Repeat CTH stable in 9AM  - Neuro following, will determine the need for steroid after reviewing repeat CTH  - MRI brain wwo C for stroke workup and rule out underlying lesion    Cards/Vasc  -No active issues  -monitor BP     Resp  -No active issues    GI  #CTAP with Indeterminate infiltrative liver mass; concerning for malignancy  - h/o alcohol use, no signs of withdrawal currently  - Liver dysfunction with INR 2, albumin 3.2 and mildly elevated Tbili (4)  - obtain MRI for further eval  - will likely need liver biopsy  - Diet as tolerated    Renal/  - Cr 0.56 on admission.  - Monitor UOP    Endo  -no issues    ID  - COVID neg  - Observe off abx    Heme/Onc  - Hgb 13.6 on admission  - Cancer hx: no personal hx of cancers; 2 sisters passed with gastric cancer, daughter has breast cancer  - Cancer screening:   -- Mammo and US wnl last year, but has had multiple workup/biopsies in the past for abnormal findings on mammo (all benign findings);   -- Last colonoscopy 3 years ago, normal finding per report  -- Never had EGD  -- PAP smear last year, reportedly normal  - CT revealed pulmonary nodules, liver mass, and possible hemorrhagic brain mets; Will need malignancy workup.    Nutrition:  Regular  DVT ppx: SCD  GI ppx: n/a  Dispo: BB to medicine if repeat CTH stable    Vital Signs Last 24 Hrs  T(C): 36.6 (29 May 2021 08:00), Max: 37.1 (29 May 2021 02:10)  T(F): 97.8 (29 May 2021 08:00), Max: 98.8 (29 May 2021 02:10)  HR: 73 (29 May 2021 13:00) (67 - 75)  BP: 128/70 (29 May 2021 13:00) (98/70 - 143/70)  BP(mean): 84 (29 May 2021 13:00) (74 - 96)  RR: 21 (29 May 2021 13:00) (14 - 26)  SpO2: 97% (29 May 2021 13:00) (95% - 100%)  I&O's Summary    28 May 2021 07:01  -  29 May 2021 07:00  --------------------------------------------------------  IN: 970 mL / OUT: 300 mL / NET: 670 mL          MEDICATIONS  (STANDING):  chlorhexidine 4% Liquid 1 Application(s) Topical <User Schedule>  levETIRAcetam 500 milliGRAM(s) Oral two times a day    MEDICATIONS  (PRN):        LABS                                            13.7                  Neurophils% (auto):   59.4   (05-29 @ 04:24):    6.15 )-----------(145          Lymphocytes% (auto):  28.3                                          39.9                   Eosinphils% (auto):   2.1      Manual%: Neutrophils x    ; Lymphocytes x    ; Eosinophils x    ; Bands%: x    ; Blasts x                                    138    |  102    |  5                   Calcium: 8.9   / iCa: x      (05-29 @ 04:24)    ----------------------------<  119       Magnesium: 1.7                              3.0     |  22     |  0.59             Phosphorous: 2.8      TPro  x      /  Alb  x      /  TBili  x      /  DBili  2.3    /  AST  x      /  ALT  x      /  AlkPhos  x      29 May 2021 04:29    ( 05-29 @ 04:24 )   PT: 20.8 sec;   INR: 1.88 ratio  aPTT: x MICU Transfer Note    Transfer from: MICU  Transfer to:  (  ) Medicine    ( X ) Telemetry    (  ) RCU    (  ) Palliative    (  ) Stroke Unit    (  ) _______________  Accepting physician: Dr. Jeff Alejandre  [id number]    HPI:  65F with no known PMH who presented to the ED with headache and vision changes. Patient found to have an intraparenchymal hemorrhage on CTH and indeterminate infiltrative liver mass concerning for metastatic neoplasm. Admitted for Q1h neuro check.         MICU COURSE:  Patient was found to have 2.4 x 3.0 x 2.4 cm left occipital acute intraparenchymal hemorrhage with associated vasogenic edema and mass effect, no midline shift. CTA head/neck 5 hrs later with unchanged left occipital acute intraparenchymal hemorrhage. No active bleeding. Repeat CTH in 9AM was stable as well. Neuro and neurosx following, and patient was started on Keppra, as well as vitamin K i/s/o elevated INR due to liver dysfunction. CTAP with Indeterminate infiltrative liver mass and multiple small pulmonary nodules; concerning for malignancy. MR head and abdomen was ordered to further assess for bleeding as well as malignancy workup.    Per patient:  - Cancer hx: no personal hx of cancers; 2 sisters passed with gastric cancer, daughter has breast cancer  - Cancer screening:   -- Mammo and US wnl last year, but has had multiple workup/biopsies in the past for abnormal findings on mammo (all benign findings);   -- Last colonoscopy 3 years ago, normal finding per report  -- Never had EGD  -- PAP smear last year, reportedly normal    Patient was HD stable with stable neuro exam over 24 hours. Neuro checks downgraded to q4h, and stable to be transferred to tele unit.       For Follow-Up:  [ ] F/u MR head and abdomen  [ ] F/u vEEG  [ ] Keep SBP < 160  [ ] F/u TTE w/ bubble study  [ ] Malignancy workup  [ ] F/u neuro and neuro sx rec  [ ] Currently not on steroids; neuro to determine after reviewing on the repeat CTH  [ ] Empirically on CIWA: patient denied heavy ETOH use, but daughter reports patient is drinking heavily.     ASSESSMENT & PLAN:     Neuro  #Patient with 2.4 x 3.0 x 2.4 cm left occipital acute intraparenchymal hemorrhage with associated vasogenic edema and mass effect, no midline shift. CTA head/neck 5 hrs later with unchanged left occipital acute intraparenchymal hemorrhage. No active bleeding  - Patient evaluated by neurosurgery, recommended Q1hr neuro checks, no surgical intervention at this time   - INR ~2 (likely iso liver dysfunction), started on vitamin K  - s/p Keppra 1g in the ED; c/w Keppra 500mg BID  - Repeat CTH stable in 9AM  - Neuro following, will determine the need for steroid after reviewing repeat CTH  - MRI brain wwo C for stroke workup and rule out underlying lesion    Cards/Vasc  -No active issues  -monitor BP     Resp  -No active issues    GI  #CTAP with Indeterminate infiltrative liver mass; concerning for malignancy  - h/o alcohol use, no signs of withdrawal currently  - Liver dysfunction with INR 2, albumin 3.2 and mildly elevated Tbili (4)  - obtain MRI for further eval  - will likely need liver biopsy  - Diet as tolerated    Renal/  - Cr 0.56 on admission.  - Monitor UOP    Endo  -no issues    ID  - COVID neg  - Observe off abx    Heme/Onc  - Hgb 13.6 on admission  - Cancer hx: no personal hx of cancers; 2 sisters passed with gastric cancer, daughter has breast cancer  - Cancer screening:   -- Mammo and US wnl last year, but has had multiple workup/biopsies in the past for abnormal findings on mammo (all benign findings);   -- Last colonoscopy 3 years ago, normal finding per report  -- Never had EGD  -- PAP smear last year, reportedly normal  - CT revealed pulmonary nodules, liver mass, and possible hemorrhagic brain mets; Will need malignancy workup.    Nutrition:  Regular  DVT ppx: SCD  GI ppx: n/a  Dispo: BB to medicine if repeat CTH stable    Vital Signs Last 24 Hrs  T(C): 36.6 (29 May 2021 08:00), Max: 37.1 (29 May 2021 02:10)  T(F): 97.8 (29 May 2021 08:00), Max: 98.8 (29 May 2021 02:10)  HR: 73 (29 May 2021 13:00) (67 - 75)  BP: 128/70 (29 May 2021 13:00) (98/70 - 143/70)  BP(mean): 84 (29 May 2021 13:00) (74 - 96)  RR: 21 (29 May 2021 13:00) (14 - 26)  SpO2: 97% (29 May 2021 13:00) (95% - 100%)  I&O's Summary    28 May 2021 07:01  -  29 May 2021 07:00  --------------------------------------------------------  IN: 970 mL / OUT: 300 mL / NET: 670 mL          MEDICATIONS  (STANDING):  chlorhexidine 4% Liquid 1 Application(s) Topical <User Schedule>  levETIRAcetam 500 milliGRAM(s) Oral two times a day    MEDICATIONS  (PRN):        LABS                                            13.7                  Neurophils% (auto):   59.4   (05-29 @ 04:24):    6.15 )-----------(145          Lymphocytes% (auto):  28.3                                          39.9                   Eosinphils% (auto):   2.1      Manual%: Neutrophils x    ; Lymphocytes x    ; Eosinophils x    ; Bands%: x    ; Blasts x                                    138    |  102    |  5                   Calcium: 8.9   / iCa: x      (05-29 @ 04:24)    ----------------------------<  119       Magnesium: 1.7                              3.0     |  22     |  0.59             Phosphorous: 2.8      TPro  x      /  Alb  x      /  TBili  x      /  DBili  2.3    /  AST  x      /  ALT  x      /  AlkPhos  x      29 May 2021 04:29    ( 05-29 @ 04:24 )   PT: 20.8 sec;   INR: 1.88 ratio  aPTT: x

## 2021-05-29 NOTE — H&P ADULT - HISTORY OF PRESENT ILLNESS
65F with no known PMH who presented to the ED with headache and vision changes. Patient stated that she first started noticing decreased vision/blurriness 2 weeks ago. She stated that since then it has vision has improved but still with some blurriness. She also started to have headache for the past 5-7 days, mostly frontal, 5-6/10 in intensity. Patient also reported having diffuse stomach pains. Of note she reported having a 20-30lb weight loss in the past 3 months; more rapidly in the past few weeks. Also with generalized weakness, needing assistance with ambulation intermittently for the past two weeks. Denied fevers, chills but endorsed night sweats. Also denied nausea, vomiting, diarrhea, constipation.

## 2021-05-29 NOTE — PROGRESS NOTE ADULT - PROBLEM SELECTOR PLAN 1
Patient with 2.4 x 3.0 x 2.4 cm left occipital acute intraparenchymal hemorrhage with associated vasogenic edema and mass effect, no midline shift. CTA head/neck 5 hrs later with unchanged left occipital acute intraparenchymal hemorrhage. No active bleeding  - Patient evaluated by neurosurgery, recommended Q1hr neuro checks while in MICU, no surgical intervention at this time   - INR ~2 (likely iso liver dysfunction), started on vitamin K  - s/p Keppra 1g in the ED; c/w Keppra 500mg BID  - Repeat CTH stable  - Neuro following, will determine the need for steroid after reviewing repeat CTH  - MRI brain wwo C for stroke workup and rule out underlying lesion  -f/u VEEG

## 2021-05-29 NOTE — PROGRESS NOTE ADULT - SUBJECTIVE AND OBJECTIVE BOX
PROGRESS NOTE:   Authoted by Dr. Mitzi Mcwilliams MD  Pager [phone] [facility], [id number] [place]     Patient is a 65y old  Female who presents with a chief complaint of Intraparenchymal Hemorrhage ICH (29 May 2021 04:40)      SUBJECTIVE / OVERNIGHT EVENTS:     REVIEW OF SYSTEMS:    CONSTITUTIONAL: No weakness, fevers or chills  EYES/ENT: No visual changes;  No vertigo or throat pain   NECK: No pain or stiffness  RESPIRATORY: No cough, wheezing, hemoptysis; No shortness of breath  CARDIOVASCULAR: No chest pain or palpitations  GASTROINTESTINAL: No abdominal or epigastric pain. No nausea, vomiting, or hematemesis; No diarrhea or constipation. No melena or hematochezia.  GENITOURINARY: No dysuria, frequency or hematuria  NEUROLOGICAL: No numbness or weakness  SKIN: No itching, rashes    MEDICATIONS  (STANDING):  chlorhexidine 4% Liquid 1 Application(s) Topical <User Schedule>  levETIRAcetam 500 milliGRAM(s) Oral two times a day  potassium chloride   Powder 40 milliEquivalent(s) Oral every 4 hours    MEDICATIONS  (PRN):      CAPILLARY BLOOD GLUCOSE        I&O's Summary    28 May 2021 07:01  -  29 May 2021 06:37  --------------------------------------------------------  IN: 650 mL / OUT: 300 mL / NET: 350 mL        PHYSICAL EXAM:  Vital Signs Last 24 Hrs  T(C): 36.8 (29 May 2021 04:00), Max: 37.1 (29 May 2021 02:10)  T(F): 98.2 (29 May 2021 04:00), Max: 98.8 (29 May 2021 02:10)  HR: 69 (29 May 2021 06:00) (61 - 75)  BP: 113/66 (29 May 2021 06:00) (113/66 - 145/79)  BP(mean): 80 (29 May 2021 06:00) (74 - 96)  RR: 17 (29 May 2021 06:00) (14 - 19)  SpO2: 95% (29 May 2021 06:00) (95% - 100%)    CONSTITUTIONAL: NAD, well-developed  RESPIRATORY: Normal respiratory effort; lungs are clear to auscultation bilaterally  CARDIOVASCULAR: Regular rate and rhythm, normal S1 and S2, no murmur/rub/gallop; No lower extremity edema; Peripheral pulses are 2+ bilaterally  ABDOMEN: Nontender to palpation, normoactive bowel sounds, no rebound/guarding; No hepatosplenomegaly  MUSCLOSKELETAL: no clubbing or cyanosis of digits; no joint swelling or tenderness to palpation  PSYCH: A+O to person, place, and time; affect appropriate  NEURO: Non-focal, no tremors  SKIN: No rashes    LABS:                        13.7   6.15  )-----------( 145      ( 29 May 2021 04:24 )             39.9         138  |  102  |  5<L>  ----------------------------<  119<H>  3.0<L>   |  22  |  0.59    Ca    8.9      29 May 2021 04:24  Phos  2.8       Mg     1.7         TPro  7.2  /  Alb  3.2<L>  /  TBili  4.3<H>  /  DBili  x   /  AST  58<H>  /  ALT  32  /  AlkPhos  95      PT/INR - ( 29 May 2021 04:24 )   PT: 20.8 sec;   INR: 1.88 ratio         PTT - ( 28 May 2021 21:00 )  PTT:40.9 sec      Urinalysis Basic - ( 28 May 2021 17:53 )    Color: Terese / Appearance: Slightly Turbid / S.023 / pH: x  Gluc: x / Ketone: Negative  / Bili: Small / Urobili: 12 mg/dL   Blood: x / Protein: 30 mg/dL / Nitrite: Negative   Leuk Esterase: Negative / RBC: 3 /HPF / WBC 3 /HPF   Sq Epi: x / Non Sq Epi: 3 /HPF / Bacteria: Few          RADIOLOGY & ADDITIONAL TESTS:  No new imaging or tests    COORDINATION OF CARE:  Care Discussed with Consultants/Other Providers [Y/N]:  Prior or Outpatient Records Reviewed [Y/N]:   PROGRESS NOTE:   Authoted by Dr. Mitzi Mcwilliams MD  Pager [phone] [facility], [id number] [place]     Patient is a 65y old  Female who presents with a chief complaint of Intraparenchymal Hemorrhage ICH (29 May 2021 04:40)      SUBJECTIVE / OVERNIGHT EVENTS: No acute events overnight. Repeat CTH 8PM stable.     REVIEW OF SYSTEMS:    CONSTITUTIONAL: No fevers or chills  EYES/ENT: Headache and vision improved   NECK: No pain or stiffness  RESPIRATORY: No cough, wheezing; No shortness of breath  CARDIOVASCULAR: No chest pain or palpitations  GASTROINTESTINAL: No abdominal or epigastric pain. No nausea, vomiting, or hematemesis; No diarrhea or constipation. No melena or hematochezia.  GENITOURINARY: No dysuria, frequency or hematuria  NEUROLOGICAL: No focal weakness  SKIN: No itching, rashes    MEDICATIONS  (STANDING):  chlorhexidine 4% Liquid 1 Application(s) Topical <User Schedule>  levETIRAcetam 500 milliGRAM(s) Oral two times a day  potassium chloride   Powder 40 milliEquivalent(s) Oral every 4 hours    MEDICATIONS  (PRN):      CAPILLARY BLOOD GLUCOSE        I&O's Summary    28 May 2021 07:01  -  29 May 2021 06:37  --------------------------------------------------------  IN: 650 mL / OUT: 300 mL / NET: 350 mL        PHYSICAL EXAM:  Vital Signs Last 24 Hrs  T(C): 36.8 (29 May 2021 04:00), Max: 37.1 (29 May 2021 02:10)  T(F): 98.2 (29 May 2021 04:00), Max: 98.8 (29 May 2021 02:10)  HR: 69 (29 May 2021 06:00) (61 - 75)  BP: 113/66 (29 May 2021 06:00) (113/66 - 145/79)  BP(mean): 80 (29 May 2021 06:00) (74 - 96)  RR: 17 (29 May 2021 06:00) (14 - 19)  SpO2: 95% (29 May 2021 06:00) (95% - 100%)    CONSTITUTIONAL: NAD, well-developed  RESPIRATORY: Normal respiratory effort; lungs are clear to auscultation bilaterally  CARDIOVASCULAR: Regular rate and rhythm, normal S1 and S2, no murmur/rub/gallop; No lower extremity edema;  BREAST: No nodules palpated. No nipple discharges, no axillary or supraclavicular lymphadenopathy  ABDOMEN: Nontender to palpation, normoactive bowel sounds, no rebound/guarding; No Hall's  MUSCULOSKELETAL: no clubbing or cyanosis of digits; no joint swelling or tenderness to palpation  PSYCH: A+O to person, place, and time; affect appropriate; emotional during interview  NEURO: Non-focal, no tremors; (+) R inferior quadrant visual cut b/l  SKIN: No rashes    LABS:                        13.7   6.15  )-----------( 145      ( 29 May 2021 04:24 )             39.9     05-    138  |  102  |  5<L>  ----------------------------<  119<H>  3.0<L>   |  22  |  0.59    Ca    8.9      29 May 2021 04:24  Phos  2.8     05-  Mg     1.7     -    TPro  7.2  /  Alb  3.2<L>  /  TBili  4.3<H>  /  DBili  x   /  AST  58<H>  /  ALT  32  /  AlkPhos  95  05-    PT/INR - ( 29 May 2021 04:24 )   PT: 20.8 sec;   INR: 1.88 ratio         PTT - ( 28 May 2021 21:00 )  PTT:40.9 sec      Urinalysis Basic - ( 28 May 2021 17:53 )    Color: Terese / Appearance: Slightly Turbid / S.023 / pH: x  Gluc: x / Ketone: Negative  / Bili: Small / Urobili: 12 mg/dL   Blood: x / Protein: 30 mg/dL / Nitrite: Negative   Leuk Esterase: Negative / RBC: 3 /HPF / WBC 3 /HPF   Sq Epi: x / Non Sq Epi: 3 /HPF / Bacteria: Few        RADIOLOGY & ADDITIONAL TESTS:  < from: CT Head No Cont (21 @ 19:53) >  IMPRESSION:    2.4 x 3.0 x 2.4 cm left occipital acute intraparenchymal hemorrhage with associated vasogenic edema and mass effect as described. There is no midline shift.    Findings were discussed with Dr. Martinez by Dr. Couch on 2021 at 7:54 PM.    < end of copied text >  < from: CT Angio Head w/ IV Cont (21 @ 23:57) >  IMPRESSION:    CT brain: Unchanged left occipital acute intraparenchymal hemorrhage. No active bleeding.    CT angiography neck: No hemodynamically significant stenosis by NASCET criteria. No vascular dissection.    CT angiography brain: No major vessel occlusion or proximal stenosis. No aneurysm or vascular malformation.    < end of copied text >  < from: CT Head No Cont (21 @ 09:23) >  IMPRESSION:    1)  subacute left occipital hemorrhage with vasogenic edema in the left parietal-occipital region. A hemorrhagic metastasis is within the differential. Further workup and assessment recommended. Follow-up MR imaging with andwithout gadolinium may be considered for further evaluation.  2)  no significant midline shift shift or hydrocephalus.    No significant change when compared with prior CT.    < end of copied text >    `c< from: CT Abdomen and Pelvis w/ IV Cont (21 @ 19:53) >  FINDINGS:  CHEST:  LUNGS AND LARGE AIRWAYS: Patent central airways. Multiple scattered bilateral pulmonary nodules, measuring up to 4 mm (2, 12).  PLEURA: No pleural effusion.  VESSELS: Aortic calcification.  HEART: Heart size is enlarged. No pericardial effusion.  MEDIASTINUM AND DORITA: No lymphadenopathy.  CHEST WALL AND LOWER NECK: Within normal limits.    ABDOMEN AND PELVIS:  LIVER: Infiltrative lesion centered in segment 6 measures approximately 6.0 x 4.5 cm.  BILE DUCTS: Normal caliber.  GALLBLADDER: Distended gallbladder with sludge.  SPLEEN: Within normal limits.  PANCREAS: Within normal limits.  ADRENALS: Within normal limits.  KIDNEYS/URETERS: Symmetric enhancement of the kidneys. No hydronephrosis.    BLADDER: Withinnormal limits.  REPRODUCTIVE ORGANS: Calcified leiomyomatous uterus.    BOWEL: No bowel obstruction. Appendix is normal.  PERITONEUM:  Mild ascites.  VESSELS: Aortic calcification.  RETROPERITONEUM/LYMPH NODES: Prominent periportal nodes.  ABDOMINAL WALL: Mild anasarca.  BONES: Degenerative changes of the spine.    IMPRESSION:  Indeterminate infiltrative liver mass; MRI recommended.    < end of copied text >      COORDINATION OF CARE:  Care Discussed with Consultants/Other Providers [Y/N]:  Prior or Outpatient Records Reviewed [Y/N]:

## 2021-05-29 NOTE — H&P ADULT - NSHPPHYSICALEXAM_GEN_ALL_CORE
GENERAL: NAD, well-developed  HEAD: Atraumatic, Normocephalic  EYES: EOMI, PERRLA, conjunctiva and sclera yellow/brown. Decreased field of vision on the R, nml field of vision on L   NECK: Supple, No JVD  CHEST/LUNG: Clear to auscultation bilaterally; No wheezes/rales/rhonchi  HEART: Regular rate and rhythm;   ABDOMEN: Soft, Nontender, Nondistended; Bowel sounds present  EXTREMITIES:  2+ dP pulses b/l, No clubbing, cyanosis, or edema  PSYCH: reactive affect  NEUROLOGY: AAOx3, non-focal, 5/5 strength in BLE/BUE, normal sensation  SKIN: No rashes or lesions

## 2021-05-29 NOTE — H&P ADULT - ASSESSMENT
65F with no known PMH who presented to the ED with headache and vision changes. Patient found to have an intraparenchymal hemorrhage on CTH and indeterminate infiltrative liver mass concerning for metastatic neoplasm.      Neuro  #Patient with 2.4 x 3.0 x 2.4 cm left occipital acute intraparenchymal hemorrhage with associated vasogenic edema and mass effect, no midline shift. CTA head/neck 5 hrs later with unchanged left occipital acute intraparenchymal hemorrhage. No active bleeding  - Patient evaluated by neurosurgery, recommended Q1hr neuro checks  - Repeat CTH  10-12hrs after original   - s/p Keppra 1g in the ED; c/w Keppra 500mg BID  - neuro consult  - MRI brain wwo C for stroke workup and rule out underlying lesion      Cards/Vasc  #  -    Resp  #  -    GI  #  -    Renal/  #  -    Endo  #  -    ID  #  -    Heme/Onc  #  -    Nutrition:  Regular  DVT ppx: SCD  GI ppx: n/a  Dispo: Observe in MICU  65F with no known PMH who presented to the ED with headache and vision changes. Patient found to have an intraparenchymal hemorrhage on CTH and indeterminate infiltrative liver mass concerning for metastatic neoplasm.      Neuro  #Patient with 2.4 x 3.0 x 2.4 cm left occipital acute intraparenchymal hemorrhage with associated vasogenic edema and mass effect, no midline shift. CTA head/neck 5 hrs later with unchanged left occipital acute intraparenchymal hemorrhage. No active bleeding  - Patient evaluated by neurosurgery, recommended Q1hr neuro checks, no surgical intervention at this time   - Repeat CTH  10-12hrs after original   - s/p Keppra 1g in the ED; c/w Keppra 500mg BID  - neuro consult  - MRI brain wwo C for stroke workup and rule out underlying lesion      Cards/Vasc  #No active issues  -monitor BP     Resp  #No active issues  -    GI  #CT   -    Renal/  #  -    Endo  #  -    ID  #  -    Heme/Onc  #  -    Nutrition:  Regular  DVT ppx: SCD  GI ppx: n/a  Dispo: Observe in MICU  65F with no known PMH who presented to the ED with headache and vision changes. Patient found to have an intraparenchymal hemorrhage on CTH and indeterminate infiltrative liver mass concerning for metastatic neoplasm.      Neuro  #Patient with 2.4 x 3.0 x 2.4 cm left occipital acute intraparenchymal hemorrhage with associated vasogenic edema and mass effect, no midline shift. CTA head/neck 5 hrs later with unchanged left occipital acute intraparenchymal hemorrhage. No active bleeding  - Patient evaluated by neurosurgery, recommended Q1hr neuro checks, no surgical intervention at this time   - Repeat CTH  10-12hrs after original   - s/p Keppra 1g in the ED; c/w Keppra 500mg BID  - neuro consult  - MRI brain wwo C for stroke workup and rule out underlying lesion      Cards/Vasc  #No active issues  -monitor BP     Resp  #No active issues  -    GI  #CTAP with Indeterminate infiltrative liver mass; concerning for malignancy  - obtain MRI for further eval  - will likely need liver biopsy    Renal/  #  -    Endo  #  -    ID  #  -    Heme/Onc  #  -    Nutrition:  Regular  DVT ppx: SCD  GI ppx: n/a  Dispo: Observe in MICU  65F with no known PMH who presented to the ED with headache and vision changes. Patient found to have an intraparenchymal hemorrhage on CTH and indeterminate infiltrative liver mass concerning for metastatic neoplasm.      Neuro  #Patient with 2.4 x 3.0 x 2.4 cm left occipital acute intraparenchymal hemorrhage with associated vasogenic edema and mass effect, no midline shift. CTA head/neck 5 hrs later with unchanged left occipital acute intraparenchymal hemorrhage. No active bleeding  - Patient evaluated by neurosurgery, recommended Q1hr neuro checks, no surgical intervention at this time   - Repeat CTH  10-12hrs after original   - s/p Keppra 1g in the ED; c/w Keppra 500mg BID  - neuro consult  - MRI brain wwo C for stroke workup and rule out underlying lesion    Cards/Vasc  -No active issues  -monitor BP     Resp  -No active issues    GI  #CTAP with Indeterminate infiltrative liver mass; concerning for malignancy  - h/o alcohol use  - Liver dysfunction with INR 2, albumin 3.2 and mildly elevated Tbili (4)  - obtain MRI for further eval  - will likely need liver biopsy    Renal/  -Cr 0.56 on admission.    Endo  -no issues    ID  -COVID neg    Heme/Onc  -Hgb 13.6 on admission    Nutrition:  Regular  DVT ppx: SCD  GI ppx: n/a  Dispo: Observe in MICU  65F with no known PMH who presented to the ED with headache and vision changes. Patient found to have an intraparenchymal hemorrhage on CTH and indeterminate infiltrative liver mass concerning for metastatic neoplasm.      Neuro  #Patient with 2.4 x 3.0 x 2.4 cm left occipital acute intraparenchymal hemorrhage with associated vasogenic edema and mass effect, no midline shift. CTA head/neck 5 hrs later with unchanged left occipital acute intraparenchymal hemorrhage. No active bleeding  - Patient evaluated by neurosurgery, recommended Q1hr neuro checks, no surgical intervention at this time   - INR 2 (likely iso liver dysfunction), started on vitamin K  - s/p Keppra 1g in the ED; c/w Keppra 500mg BID  - Repeat CTH 10-12 hrs after original   - neuro consult  - MRI brain wwo C for stroke workup and rule out underlying lesion    Cards/Vasc  -No active issues  -monitor BP     Resp  -No active issues    GI  #CTAP with Indeterminate infiltrative liver mass; concerning for malignancy  - h/o alcohol use  - Liver dysfunction with INR 2, albumin 3.2 and mildly elevated Tbili (4)  - obtain MRI for further eval  - will likely need liver biopsy    Renal/  -Cr 0.56 on admission.    Endo  -no issues    ID  -COVID neg    Heme/Onc  -Hgb 13.6 on admission    Nutrition:  Regular  DVT ppx: SCD  GI ppx: n/a  Dispo: Observe in MICU

## 2021-05-29 NOTE — PROGRESS NOTE ADULT - PROBLEM SELECTOR PLAN 2
CTAP with Indeterminate infiltrative liver mass; concerning for malignancy  - h/o alcohol use, no signs of withdrawal currently  - Liver dysfunction with INR 2, albumin 3.2 and mildly elevated Tbili (4)  - obtain MRI for further eval  - will likely need liver biopsy  - Diet as tolerated    Malignancy Screening:  Hgb 13.6 on admission  Cancer hx: no personal hx of cancers; 2 sisters passed with gastric cancer, daughter has breast cancer  Cancer screening:   Mammo and US wnl last year, but has had multiple workup/biopsies in the past for abnormal findings on mammo (all benign findings);   Last colonoscopy 3 years ago, normal finding per report  Never had EGD  PAP smear last year, reportedly normal  CT revealed pulmonary nodules, liver mass, and possible hemorrhagic brain mets; Will need malignancy workup.

## 2021-05-29 NOTE — PROGRESS NOTE ADULT - ASSESSMENT
65F with no known PMH who presented to the ED with headache and vision changes, found to have an intraparenchymal hemorrhage on CTH and indeterminate infiltrative liver mass concerning for metastatic neoplasm, s/p MICU stay for q1 hour neuro checks.

## 2021-05-29 NOTE — H&P ADULT - ATTENDING COMMENTS
65F with no known PMH who presented to the ED with headache and vision changes. Patient found to have an intraparenchymal hemorrhage on CTH and indeterminate infiltrative liver mass concerning for metastatic neoplasm.  neurosurg eval, neuro eval  per Nsx q1h neuro checks, Keppra    repeat imaging in am, MRI   will need heme/onc work up

## 2021-05-29 NOTE — CONSULT NOTE ADULT - ASSESSMENT
Mendoza, Griselda  65 F no significant PMH with months of weight loss, now with HA for 3 days, abdominal pain and change in colour of sclera.  CTH with 2.3 cm IPH in L occipital lobe. Stable on 4 hr CTH. CTA negative. CT abdomen with Liver mass. Coags with elevated INR and PTT.  No AC or AP.   - No acute neurosurgical intervention  - Q1 hr neurochecks given abdnormal Coags and large IPH  - Keppra 500BID  - FFP for coags and Vit K f  - CTH in AM  - MRI brain wwo C for stroke workup and rule out underlying lesion  - Neurology consult

## 2021-05-29 NOTE — PROGRESS NOTE ADULT - SUBJECTIVE AND OBJECTIVE BOX
Authored by Fidelia Carrillo MD, PGY1  PATIENT:  GRISELDA MENDOZA  [id number]    CHIEF COMPLAINT:  Patient is a 65y old  Female who presents with a chief complaint of Intraparenchymal Hemorrhage ICH (29 May 2021 06:36)      INTERVAL HISTORY OVERNIGHT EVENTS:      REVIEW OF SYSTEMS:    Constitutional:     [ ] negative [ ] fevers [ ] chills [ ] weight loss [ ] weight gain  HEENT:                  [ ] negative [ ] dry eyes [ ] eye irritation [ ] postnasal drip [ ] nasal congestion  CV:                         [ ] negative  [ ] chest pain [ ] orthopnea [ ] palpitations [ ] murmur  Resp:                     [ ] negative [ ] cough [ ] shortness of breath [ ] dyspnea [ ] wheezing [ ] sputum [ ] hemoptysis  GI:                          [ ] negative [ ] nausea [ ] vomiting [ ] diarrhea [ ] constipation [ ] abd pain [ ] dysphagia   :                        [ ] negative [ ] dysuria [ ] nocturia [ ] hematuria [ ] increased urinary frequency  Musculoskeletal: [ ] negative [ ] back pain [ ] myalgias [ ] arthralgias [ ] fracture  Skin:                       [ ] negative [ ] rash [ ] itch  Neurological:        [ ] negative [ ] headache [ ] dizziness [ ] syncope [ ] weakness [ ] numbness  Psychiatric:           [ ] negative [ ] anxiety [ ] depression  Endocrine:            [ ] negative [ ] diabetes [ ] thyroid problem  Heme/Lymph:      [ ] negative [ ] anemia [ ] bleeding problem  Allergic/Immune: [ ] negative [ ] itchy eyes [ ] nasal discharge [ ] hives [ ] angioedema    [ ] All other systems negative  [ ] Unable to assess ROS because ________.    MEDICATIONS:  MEDICATIONS  (STANDING):  folic acid 1 milliGRAM(s) Oral daily  levETIRAcetam 500 milliGRAM(s) Oral two times a day  multivitamin 1 Tablet(s) Oral daily  thiamine 100 milliGRAM(s) Oral daily    MEDICATIONS  (PRN):  LORazepam     Tablet 1 milliGRAM(s) Oral every 2 hours PRN CIWA-Ar score increase by 2 points and a total score of 7 or less  LORazepam   Injectable 1 milliGRAM(s) IV Push every 1 hour PRN CIWA-Ar score 8 or greater      ALLERGIES:  Allergies    No Known Allergies    Intolerances        OBJECTIVE:  ICU Vital Signs Last 24 Hrs  T(C): 37.1 (29 May 2021 16:43), Max: 37.1 (29 May 2021 02:10)  T(F): 98.8 (29 May 2021 16:43), Max: 98.8 (29 May 2021 02:10)  HR: 78 (29 May 2021 16:43) (67 - 78)  BP: 133/66 (29 May 2021 16:43) (98/70 - 143/70)  BP(mean): 79 (29 May 2021 15:00) (74 - 96)  ABP: --  ABP(mean): --  RR: 18 (29 May 2021 16:43) (14 - 26)  SpO2: 100% (29 May 2021 16:43) (95% - 100%)      Adult Advanced Hemodynamics Last 24 Hrs  CVP(mm Hg): --  CVP(cm H2O): --  CO: --  CI: --  PA: --  PA(mean): --  PCWP: --  SVR: --  SVRI: --  PVR: --  PVRI: --  CAPILLARY BLOOD GLUCOSE        CAPILLARY BLOOD GLUCOSE        I&O's Summary    28 May 2021 07:01  -  29 May 2021 07:00  --------------------------------------------------------  IN: 970 mL / OUT: 300 mL / NET: 670 mL      Daily Height in cm: 154.94 (29 May 2021 02:43)    Daily Weight in k.1 (29 May 2021 16:43)    PHYSICAL EXAMINATION:  General: WN/WD NAD  HEENT: PERRLA, EOMI, moist mucous membranes  Neurology: A&Ox3, nonfocal, DALAL x 4  Respiratory: CTA B/L, normal respiratory effort, no wheezes, crackles, rales  CV: RRR, S1S2, no murmurs, rubs or gallops  Abdominal: Soft, NT, ND +BS, Last BM  Extremities: No edema, + peripheral pulses  Incisions:   Tubes:    LABS:                          13.7   6.15  )-----------( 145      ( 29 May 2021 04:24 )             39.9     05-    138  |  102  |  5<L>  ----------------------------<  119<H>  3.0<L>   |  22  |  0.59    Ca    8.9      29 May 2021 04:24  Phos  2.8       Mg     1.7         TPro  x   /  Alb  x   /  TBili  x   /  DBili  2.3<H>  /  AST  x   /  ALT  x   /  AlkPhos  x       LIVER FUNCTIONS - ( 29 May 2021 04:24 )  Alb: 3.2 g/dL / Pro: 7.2 g/dL / ALK PHOS: 95 U/L / ALT: 32 U/L / AST: 58 U/L / GGT: x           PT/INR - ( 29 May 2021 04:24 )   PT: 20.8 sec;   INR: 1.88 ratio         PTT - ( 28 May 2021 21:00 )  PTT:40.9 sec        Urinalysis Basic - ( 28 May 2021 17:53 )    Color: Terese / Appearance: Slightly Turbid / S.023 / pH: x  Gluc: x / Ketone: Negative  / Bili: Small / Urobili: 12 mg/dL   Blood: x / Protein: 30 mg/dL / Nitrite: Negative   Leuk Esterase: Negative / RBC: 3 /HPF / WBC 3 /HPF   Sq Epi: x / Non Sq Epi: 3 /HPF / Bacteria: Few        TELEMETRY:     EKG:     IMAGING:       Authored by Fidelia Carrillo MD, PGY1  PATIENT:  GRISELDA MENDOZA  [id number]    CHIEF COMPLAINT:  Patient is a 65y old  Female who presents with a chief complaint of Intraparenchymal Hemorrhage ICH (29 May 2021 06:36)      INTERVAL HISTORY OVERNIGHT EVENTS: Patient transferred to medicine floors. Patient with no acute complaints.       MEDICATIONS:  MEDICATIONS  (STANDING):  folic acid 1 milliGRAM(s) Oral daily  levETIRAcetam 500 milliGRAM(s) Oral two times a day  multivitamin 1 Tablet(s) Oral daily  thiamine 100 milliGRAM(s) Oral daily    MEDICATIONS  (PRN):  LORazepam     Tablet 1 milliGRAM(s) Oral every 2 hours PRN CIWA-Ar score increase by 2 points and a total score of 7 or less  LORazepam   Injectable 1 milliGRAM(s) IV Push every 1 hour PRN CIWA-Ar score 8 or greater      ALLERGIES:  Allergies    No Known Allergies    Intolerances        OBJECTIVE:  ICU Vital Signs Last 24 Hrs  T(C): 37.1 (29 May 2021 16:43), Max: 37.1 (29 May 2021 02:10)  T(F): 98.8 (29 May 2021 16:43), Max: 98.8 (29 May 2021 02:10)  HR: 78 (29 May 2021 16:43) (67 - 78)  BP: 133/66 (29 May 2021 16:43) (98/70 - 143/70)  BP(mean): 79 (29 May 2021 15:00) (74 - 96)  ABP: --  ABP(mean): --  RR: 18 (29 May 2021 16:43) (14 - 26)  SpO2: 100% (29 May 2021 16:43) (95% - 100%)        I&O's Summary    28 May 2021 07:01  -  29 May 2021 07:00  --------------------------------------------------------  IN: 970 mL / OUT: 300 mL / NET: 670 mL      Daily Height in cm: 154.94 (29 May 2021 02:43)    Daily Weight in k.1 (29 May 2021 16:43)    PHYSICAL EXAMINATION:  CONSTITUTIONAL: NAD, well-developed  RESPIRATORY: Normal respiratory effort; lungs are clear to auscultation bilaterally  CARDIOVASCULAR: Regular rate and rhythm, normal S1 and S2, no murmur/rub/gallop; No lower extremity edema;  BREAST: No nodules palpated. No nipple discharges, no axillary or supraclavicular lymphadenopathy  ABDOMEN: Nontender to palpation, normoactive bowel sounds, no rebound/guarding; No Hall's  MUSCULOSKELETAL: no clubbing or cyanosis of digits; no joint swelling or tenderness to palpation  PSYCH: A+O to person, place, and time; affect appropriate; emotional during interview  NEURO: Non-focal, no tremors; (+) R inferior quadrant visual cut b/l  SKIN: No rashes      LABS:                          13.7   6.15  )-----------( 145      ( 29 May 2021 04:24 )             39.9     05-    138  |  102  |  5<L>  ----------------------------<  119<H>  3.0<L>   |  22  |  0.59    Ca    8.9      29 May 2021 04:24  Phos  2.8     -  Mg     1.7         TPro  x   /  Alb  x   /  TBili  x   /  DBili  2.3<H>  /  AST  x   /  ALT  x   /  AlkPhos  x       LIVER FUNCTIONS - ( 29 May 2021 04:24 )  Alb: 3.2 g/dL / Pro: 7.2 g/dL / ALK PHOS: 95 U/L / ALT: 32 U/L / AST: 58 U/L / GGT: x           PT/INR - ( 29 May 2021 04:24 )   PT: 20.8 sec;   INR: 1.88 ratio         PTT - ( 28 May 2021 21:00 )  PTT:40.9 sec        Urinalysis Basic - ( 28 May 2021 17:53 )    Color: Terese / Appearance: Slightly Turbid / S.023 / pH: x  Gluc: x / Ketone: Negative  / Bili: Small / Urobili: 12 mg/dL   Blood: x / Protein: 30 mg/dL / Nitrite: Negative   Leuk Esterase: Negative / RBC: 3 /HPF / WBC 3 /HPF   Sq Epi: x / Non Sq Epi: 3 /HPF / Bacteria: Few        TELEMETRY:     EKG:     IMAGING:      RADIOLOGY & ADDITIONAL TESTS:  < from: CT Head No Cont (21 @ 19:53) >  IMPRESSION:    2.4 x 3.0 x 2.4 cm left occipital acute intraparenchymal hemorrhage with associated vasogenic edema and mass effect as described. There is no midline shift.    Findings were discussed with Dr. Martinez by Dr. Couch on 2021 at 7:54 PM.    < end of copied text >  < from: CT Angio Head w/ IV Cont (21 @ 23:57) >  IMPRESSION:    CT brain: Unchanged left occipital acute intraparenchymal hemorrhage. No active bleeding.    CT angiography neck: No hemodynamically significant stenosis by NASCET criteria. No vascular dissection.    CT angiography brain: No major vessel occlusion or proximal stenosis. No aneurysm or vascular malformation.    < end of copied text >  < from: CT Head No Cont (21 @ 09:23) >  IMPRESSION:    1)  subacute left occipital hemorrhage with vasogenic edema in the left parietal-occipital region. A hemorrhagic metastasis is within the differential. Further workup and assessment recommended. Follow-up MR imaging with andwithout gadolinium may be considered for further evaluation.  2)  no significant midline shift shift or hydrocephalus.    No significant change when compared with prior CT.    < end of copied text >    `c< from: CT Abdomen and Pelvis w/ IV Cont (21 @ 19:53) >  FINDINGS:  CHEST:  LUNGS AND LARGE AIRWAYS: Patent central airways. Multiple scattered bilateral pulmonary nodules, measuring up to 4 mm (2, 12).  PLEURA: No pleural effusion.  VESSELS: Aortic calcification.  HEART: Heart size is enlarged. No pericardial effusion.  MEDIASTINUM AND DORITA: No lymphadenopathy.  CHEST WALL AND LOWER NECK: Within normal limits.    ABDOMEN AND PELVIS:  LIVER: Infiltrative lesion centered in segment 6 measures approximately 6.0 x 4.5 cm.  BILE DUCTS: Normal caliber.  GALLBLADDER: Distended gallbladder with sludge.  SPLEEN: Within normal limits.  PANCREAS: Within normal limits.  ADRENALS: Within normal limits.  KIDNEYS/URETERS: Symmetric enhancement of the kidneys. No hydronephrosis.    BLADDER: Withinnormal limits.  REPRODUCTIVE ORGANS: Calcified leiomyomatous uterus.    BOWEL: No bowel obstruction. Appendix is normal.  PERITONEUM:  Mild ascites.  VESSELS: Aortic calcification.  RETROPERITONEUM/LYMPH NODES: Prominent periportal nodes.  ABDOMINAL WALL: Mild anasarca.  BONES: Degenerative changes of the spine.    IMPRESSION:  Indeterminate infiltrative liver mass; MRI recommended.    < end of copied text >

## 2021-05-30 ENCOUNTER — TRANSCRIPTION ENCOUNTER (OUTPATIENT)
Age: 66
End: 2021-05-30

## 2021-05-30 LAB
ALBUMIN SERPL ELPH-MCNC: 2.7 G/DL — LOW (ref 3.3–5)
ALP SERPL-CCNC: 122 U/L — HIGH (ref 40–120)
ALT FLD-CCNC: 28 U/L — SIGNIFICANT CHANGE UP (ref 4–33)
ANION GAP SERPL CALC-SCNC: 13 MMOL/L — SIGNIFICANT CHANGE UP (ref 7–14)
APTT BLD: 39.4 SEC — HIGH (ref 27–36.3)
AST SERPL-CCNC: 51 U/L — HIGH (ref 4–32)
BILIRUB SERPL-MCNC: 2.8 MG/DL — HIGH (ref 0.2–1.2)
BUN SERPL-MCNC: 5 MG/DL — LOW (ref 7–23)
CALCIUM SERPL-MCNC: 8.5 MG/DL — SIGNIFICANT CHANGE UP (ref 8.4–10.5)
CHLORIDE SERPL-SCNC: 103 MMOL/L — SIGNIFICANT CHANGE UP (ref 98–107)
CO2 SERPL-SCNC: 24 MMOL/L — SIGNIFICANT CHANGE UP (ref 22–31)
COVID-19 SPIKE DOMAIN AB INTERP: POSITIVE
COVID-19 SPIKE DOMAIN ANTIBODY RESULT: >250 U/ML — HIGH
CREAT SERPL-MCNC: 0.56 MG/DL — SIGNIFICANT CHANGE UP (ref 0.5–1.3)
GLUCOSE SERPL-MCNC: 116 MG/DL — HIGH (ref 70–99)
HAV IGM SER-ACNC: SIGNIFICANT CHANGE UP
HBV CORE IGM SER-ACNC: SIGNIFICANT CHANGE UP
HBV SURFACE AB SER-ACNC: SIGNIFICANT CHANGE UP
HBV SURFACE AG SER-ACNC: REACTIVE
HCT VFR BLD CALC: 34.6 % — SIGNIFICANT CHANGE UP (ref 34.5–45)
HCV AB S/CO SERPL IA: 0.35 S/CO — SIGNIFICANT CHANGE UP (ref 0–0.99)
HCV AB SERPL-IMP: SIGNIFICANT CHANGE UP
HGB BLD-MCNC: 11.4 G/DL — LOW (ref 11.5–15.5)
INR BLD: 1.83 RATIO — HIGH (ref 0.88–1.16)
MAGNESIUM SERPL-MCNC: 1.7 MG/DL — SIGNIFICANT CHANGE UP (ref 1.6–2.6)
MCHC RBC-ENTMCNC: 32.6 PG — SIGNIFICANT CHANGE UP (ref 27–34)
MCHC RBC-ENTMCNC: 32.9 GM/DL — SIGNIFICANT CHANGE UP (ref 32–36)
MCV RBC AUTO: 98.9 FL — SIGNIFICANT CHANGE UP (ref 80–100)
NRBC # BLD: 0 /100 WBCS — SIGNIFICANT CHANGE UP
NRBC # FLD: 0 K/UL — SIGNIFICANT CHANGE UP
PHOSPHATE SERPL-MCNC: 3.1 MG/DL — SIGNIFICANT CHANGE UP (ref 2.5–4.5)
PLATELET # BLD AUTO: 129 K/UL — LOW (ref 150–400)
POTASSIUM SERPL-MCNC: 3.1 MMOL/L — LOW (ref 3.5–5.3)
POTASSIUM SERPL-SCNC: 3.1 MMOL/L — LOW (ref 3.5–5.3)
PROT SERPL-MCNC: 6.4 G/DL — SIGNIFICANT CHANGE UP (ref 6–8.3)
PROTHROM AB SERPL-ACNC: 20.4 SEC — HIGH (ref 10.6–13.6)
RBC # BLD: 3.5 M/UL — LOW (ref 3.8–5.2)
RBC # FLD: 14.8 % — HIGH (ref 10.3–14.5)
SARS-COV-2 IGG+IGM SERPL QL IA: >250 U/ML — HIGH
SARS-COV-2 IGG+IGM SERPL QL IA: POSITIVE
SODIUM SERPL-SCNC: 140 MMOL/L — SIGNIFICANT CHANGE UP (ref 135–145)
WBC # BLD: 5.2 K/UL — SIGNIFICANT CHANGE UP (ref 3.8–10.5)
WBC # FLD AUTO: 5.2 K/UL — SIGNIFICANT CHANGE UP (ref 3.8–10.5)

## 2021-05-30 PROCEDURE — 99233 SBSQ HOSP IP/OBS HIGH 50: CPT | Mod: GC

## 2021-05-30 RX ORDER — POTASSIUM CHLORIDE 20 MEQ
40 PACKET (EA) ORAL EVERY 4 HOURS
Refills: 0 | Status: COMPLETED | OUTPATIENT
Start: 2021-05-30 | End: 2021-05-30

## 2021-05-30 RX ORDER — MAGNESIUM SULFATE 500 MG/ML
1 VIAL (ML) INJECTION ONCE
Refills: 0 | Status: COMPLETED | OUTPATIENT
Start: 2021-05-30 | End: 2021-05-30

## 2021-05-30 RX ORDER — MAGNESIUM SULFATE 500 MG/ML
2 VIAL (ML) INJECTION ONCE
Refills: 0 | Status: DISCONTINUED | OUTPATIENT
Start: 2021-05-30 | End: 2021-05-30

## 2021-05-30 RX ORDER — POTASSIUM CHLORIDE 20 MEQ
20 PACKET (EA) ORAL
Refills: 0 | Status: DISCONTINUED | OUTPATIENT
Start: 2021-05-30 | End: 2021-05-30

## 2021-05-30 RX ADMIN — Medication 100 MILLIGRAM(S): at 17:37

## 2021-05-30 RX ADMIN — Medication 1 TABLET(S): at 13:19

## 2021-05-30 RX ADMIN — Medication 40 MILLIEQUIVALENT(S): at 17:58

## 2021-05-30 RX ADMIN — Medication 100 GRAM(S): at 11:29

## 2021-05-30 RX ADMIN — Medication 40 MILLIEQUIVALENT(S): at 11:44

## 2021-05-30 RX ADMIN — LEVETIRACETAM 500 MILLIGRAM(S): 250 TABLET, FILM COATED ORAL at 05:37

## 2021-05-30 RX ADMIN — Medication 1 MILLIGRAM(S): at 13:19

## 2021-05-30 RX ADMIN — Medication 40 MILLIEQUIVALENT(S): at 22:37

## 2021-05-30 RX ADMIN — LEVETIRACETAM 500 MILLIGRAM(S): 250 TABLET, FILM COATED ORAL at 17:38

## 2021-05-30 NOTE — DIETITIAN NUTRITION RISK NOTIFICATION - TREATMENT: THE FOLLOWING DIET HAS BEEN RECOMMENDED
1. Continue current diet order per MD. Pending SLP swallow eval. Defer diet consistency to SLP's rec/MD order.  2. Will send Orgain Shake x3 daily (660kcal, 48g protein), and magic cup x1 at lunch (290kcal, 9g protein). 3. c/w Multivitamin, thiamine, folic acid as clinically indicate per MD order. 4. Monitor weights, labs, BM's, skin integrity, PO intake/tolerance. 5. Encourage po intake, assist with meals and menu selections, provide alternatives PRN.    Please refer to nutrition assessment completed on 5/30/21 for other details.

## 2021-05-30 NOTE — DIETITIAN INITIAL EVALUATION ADULT. - ADD RECOMMEND
1. Continue current diet order per MD. Pending SLP swallow eval. Defer diet consistency to SLP's rec/MD order.  2. Will send Orgain Shake x3 daily (660kcal, 48g protein), and magic cup x1 at lunch (290kcal, 9g protein). 3. c/w Multivitamin, thiamine, folic acid as clinically indicate per MD order. 4. Monitor weights, labs, BM's, skin integrity, PO intake/tolerance. 5. Encourage po intake, assist with meals and menu selections, provide alternatives PRN.

## 2021-05-30 NOTE — DIETITIAN INITIAL EVALUATION ADULT. - PERTINENT LABORATORY DATA
05-30 Na140 mmol/L Glu 116 mg/dL<H> K+ 3.1 mmol/L<L> Cr  0.56 mg/dL BUN 5 mg/dL<L> 05-30 Phos 3.1 mg/dL 05-30 Alb 2.7 g/dL<L>

## 2021-05-30 NOTE — DISCHARGE NOTE PROVIDER - CARE PROVIDER_API CALL
Suleman Valadez)  Neurology; Vascular Neurology  [address] [facility] [address]  [place] [address]  Phone: [phone]  Fax: [phone]  Follow Up Time:     Tommy Mckinney)  Gastroenterology  [address] [place] [address]  Phone: [phone]  Fax: [phone]  Follow Up Time:    Suleman Valadez)  Neurology; Vascular Neurology  [address] [facility] [address] [facility]  [place] [address]  Phone: [phone]  Fax: [phone]  Follow Up Time:     Tommy Mckinney)  Gastroenterology  [address] [place] [address]  Phone: [phone]  Fax: [phone]  Follow Up Time:     Juan Tello)  Internal Medicine  [address] [facility], [address]  [place] [address]  Phone: [phone]  Fax: [phone]  Follow Up Time: 1 week

## 2021-05-30 NOTE — PROGRESS NOTE ADULT - SUBJECTIVE AND OBJECTIVE BOX
Authored by Fidelia Carrillo MD, PGY1  PATIENT:  GRISELDA MENDOZA  [id number]    CHIEF COMPLAINT:  Patient is a 65y old  Female who presents with a chief complaint of Intraparenchymal Hemorrhage ICH (29 May 2021 18:39)      INTERVAL HISTORY OVERNIGHT EVENTS: SALOMON overnight.         MEDICATIONS:  MEDICATIONS  (STANDING):  folic acid 1 milliGRAM(s) Oral daily  levETIRAcetam 500 milliGRAM(s) Oral two times a day  multivitamin 1 Tablet(s) Oral daily  thiamine 100 milliGRAM(s) Oral daily    MEDICATIONS  (PRN):  LORazepam     Tablet 1 milliGRAM(s) Oral every 2 hours PRN CIWA-Ar score increase by 2 points and a total score of 7 or less  LORazepam   Injectable 1 milliGRAM(s) IV Push every 1 hour PRN CIWA-Ar score 8 or greater      ALLERGIES:  Allergies    No Known Allergies    Intolerances        OBJECTIVE:  ICU Vital Signs Last 24 Hrs  T(C): 37 (30 May 2021 06:05), Max: 37.2 (29 May 2021 21:00)  T(F): 98.6 (30 May 2021 06:05), Max: 98.9 (29 May 2021 21:00)  HR: 75 (30 May 2021 06:05) (67 - 78)  BP: 120/68 (30 May 2021 06:05) (98/70 - 137/83)  BP(mean): 79 (29 May 2021 15:00) (79 - 93)  ABP: --  ABP(mean): --  RR: 18 (30 May 2021 06:05) (16 - 26)  SpO2: 99% (30 May 2021 06:05) (95% - 100%)          I&O's Summary    Daily     Daily Weight in k.1 (29 May 2021 16:43)    PHYSICAL EXAMINATION:  CONSTITUTIONAL: NAD, well-developed  RESPIRATORY: Normal respiratory effort; lungs are clear to auscultation bilaterally  CARDIOVASCULAR: Regular rate and rhythm, normal S1 and S2, no murmur/rub/gallop; No lower extremity edema;  BREAST: No nodules palpated. No nipple discharges, no axillary or supraclavicular lymphadenopathy  ABDOMEN: Nontender to palpation, normoactive bowel sounds, no rebound/guarding; No Hall's  MUSCULOSKELETAL: no clubbing or cyanosis of digits; no joint swelling or tenderness to palpation  PSYCH: A+O to person, place, and time; affect appropriate; emotional during interview  NEURO: Non-focal, no tremors; (+) R inferior quadrant visual cut b/l  SKIN: No rashes    LABS:                          11.4   5.20  )-----------( 129      ( 30 May 2021 07:13 )             34.6         138  |  102  |  5<L>  ----------------------------<  119<H>  3.0<L>   |  22  |  0.59    Ca    8.9      29 May 2021 04:24  Phos  2.8       Mg     1.7         TPro  x   /  Alb  x   /  TBili  x   /  DBili  2.3<H>  /  AST  x   /  ALT  x   /  AlkPhos  x       LIVER FUNCTIONS - ( 29 May 2021 04:24 )  Alb: 3.2 g/dL / Pro: 7.2 g/dL / ALK PHOS: 95 U/L / ALT: 32 U/L / AST: 58 U/L / GGT: x           PT/INR - ( 30 May 2021 07:13 )   PT: 20.4 sec;   INR: 1.83 ratio         PTT - ( 30 May 2021 07:13 )  PTT:39.4 sec        Urinalysis Basic - ( 28 May 2021 17:53 )    Color: Terese / Appearance: Slightly Turbid / S.023 / pH: x  Gluc: x / Ketone: Negative  / Bili: Small / Urobili: 12 mg/dL   Blood: x / Protein: 30 mg/dL / Nitrite: Negative   Leuk Esterase: Negative / RBC: 3 /HPF / WBC 3 /HPF   Sq Epi: x / Non Sq Epi: 3 /HPF / Bacteria: Few        TELEMETRY:     EKG:     IMAGING:    < from: CT Head No Cont (21 @ 09:23) >    IMPRESSION:    1)  subacute left occipital hemorrhage with vasogenic edema in the left parietal-occipital region. A hemorrhagic metastasis is within the differential. Further workup and assessment recommended. Follow-up MR imaging with andwithout gadolinium may be considered for further evaluation.  2)  no significant midline shift shift or hydrocephalus.    No significant change when compared with prior CT.    < end of copied text >     Authored by Fidelia Carrillo MD, PGY1  PATIENT:  GRISELDA MENDOZA  [id number]    CHIEF COMPLAINT:  Patient is a 65y old  Female who presents with a chief complaint of Intraparenchymal Hemorrhage ICH (29 May 2021 18:39)      INTERVAL HISTORY OVERNIGHT EVENTS: SALOMON overnight. This AM, patient endorses continued blurry vision, no other acute complaints. Anxious and tearful over potential malignancy diagnosis.         MEDICATIONS:  MEDICATIONS  (STANDING):  folic acid 1 milliGRAM(s) Oral daily  levETIRAcetam 500 milliGRAM(s) Oral two times a day  multivitamin 1 Tablet(s) Oral daily  thiamine 100 milliGRAM(s) Oral daily    MEDICATIONS  (PRN):  LORazepam     Tablet 1 milliGRAM(s) Oral every 2 hours PRN CIWA-Ar score increase by 2 points and a total score of 7 or less  LORazepam   Injectable 1 milliGRAM(s) IV Push every 1 hour PRN CIWA-Ar score 8 or greater      ALLERGIES:  Allergies    No Known Allergies    Intolerances        OBJECTIVE:  ICU Vital Signs Last 24 Hrs  T(C): 37 (30 May 2021 06:05), Max: 37.2 (29 May 2021 21:00)  T(F): 98.6 (30 May 2021 06:05), Max: 98.9 (29 May 2021 21:00)  HR: 75 (30 May 2021 06:05) (67 - 78)  BP: 120/68 (30 May 2021 06:05) (98/70 - 137/83)  BP(mean): 79 (29 May 2021 15:00) (79 - 93)  ABP: --  ABP(mean): --  RR: 18 (30 May 2021 06:05) (16 - 26)  SpO2: 99% (30 May 2021 06:05) (95% - 100%)          I&O's Summary    Daily     Daily Weight in k.1 (29 May 2021 16:43)    PHYSICAL EXAMINATION:  CONSTITUTIONAL: NAD, well-developed  RESPIRATORY: Normal respiratory effort; lungs are clear to auscultation bilaterally  CARDIOVASCULAR: Regular rate and rhythm, normal S1 and S2, no murmur/rub/gallop; No lower extremity edema;  BREAST: No nodules palpated. No nipple discharges, no axillary or supraclavicular lymphadenopathy  ABDOMEN: Nontender to palpation, normoactive bowel sounds, no rebound/guarding; No Hall's  MUSCULOSKELETAL: no clubbing or cyanosis of digits; no joint swelling or tenderness to palpation  PSYCH: A+O to person, place, and time; affect appropriate; emotional during interview  NEURO: Non-focal, no tremors; (+) R inferior quadrant visual cut b/l  SKIN: No rashes    LABS:                          11.4   5.20  )-----------( 129      ( 30 May 2021 07:13 )             34.6         138  |  102  |  5<L>  ----------------------------<  119<H>  3.0<L>   |  22  |  0.59    Ca    8.9      29 May 2021 04:24  Phos  2.8       Mg     1.7         TPro  x   /  Alb  x   /  TBili  x   /  DBili  2.3<H>  /  AST  x   /  ALT  x   /  AlkPhos  x       LIVER FUNCTIONS - ( 29 May 2021 04:24 )  Alb: 3.2 g/dL / Pro: 7.2 g/dL / ALK PHOS: 95 U/L / ALT: 32 U/L / AST: 58 U/L / GGT: x           PT/INR - ( 30 May 2021 07:13 )   PT: 20.4 sec;   INR: 1.83 ratio         PTT - ( 30 May 2021 07:13 )  PTT:39.4 sec        Urinalysis Basic - ( 28 May 2021 17:53 )    Color: Terese / Appearance: Slightly Turbid / S.023 / pH: x  Gluc: x / Ketone: Negative  / Bili: Small / Urobili: 12 mg/dL   Blood: x / Protein: 30 mg/dL / Nitrite: Negative   Leuk Esterase: Negative / RBC: 3 /HPF / WBC 3 /HPF   Sq Epi: x / Non Sq Epi: 3 /HPF / Bacteria: Few        TELEMETRY:     EKG:     IMAGING:    < from: CT Head No Cont (21 @ 09:23) >    IMPRESSION:    1)  subacute left occipital hemorrhage with vasogenic edema in the left parietal-occipital region. A hemorrhagic metastasis is within the differential. Further workup and assessment recommended. Follow-up MR imaging with andwithout gadolinium may be considered for further evaluation.  2)  no significant midline shift shift or hydrocephalus.    No significant change when compared with prior CT.    < end of copied text >     Authored by Fidelia Carrillo MD, PGY1  PATIENT:  GRISELDA MENDOZA  [id number]    CHIEF COMPLAINT:  Patient is a 65y old  Female who presents with a chief complaint of Intraparenchymal Hemorrhage ICH (29 May 2021 18:39)      INTERVAL HISTORY OVERNIGHT EVENTS: SALOMON overnight. This AM, patient endorses continued blurry vision, no other acute complaints. Anxious and tearful over potential malignancy diagnosis.     : Spoke to patient's daughter at length, patient has declining mental status over the past 2 years related to increasing alcohol abuse. She will "drink a gallon of wine in a day." Her house has become increasingly messy, "hoarding," and reserved. This is a sharp contrast to prior baseline where she was cleanly, etc. Patient's daughter amenable to social work consult and SBIRT counseler. Patient not taking any medication at home, not following with PCP, was recently prescribed eye drops for blurry vision found to be 2/2 CNS process.         MEDICATIONS:  MEDICATIONS  (STANDING):  folic acid 1 milliGRAM(s) Oral daily  levETIRAcetam 500 milliGRAM(s) Oral two times a day  multivitamin 1 Tablet(s) Oral daily  thiamine 100 milliGRAM(s) Oral daily    MEDICATIONS  (PRN):  LORazepam     Tablet 1 milliGRAM(s) Oral every 2 hours PRN CIWA-Ar score increase by 2 points and a total score of 7 or less  LORazepam   Injectable 1 milliGRAM(s) IV Push every 1 hour PRN CIWA-Ar score 8 or greater      ALLERGIES:  Allergies    No Known Allergies    Intolerances        OBJECTIVE:  ICU Vital Signs Last 24 Hrs  T(C): 37 (30 May 2021 06:05), Max: 37.2 (29 May 2021 21:00)  T(F): 98.6 (30 May 2021 06:05), Max: 98.9 (29 May 2021 21:00)  HR: 75 (30 May 2021 06:05) (67 - 78)  BP: 120/68 (30 May 2021 06:05) (98/70 - 137/83)  BP(mean): 79 (29 May 2021 15:00) (79 - 93)  ABP: --  ABP(mean): --  RR: 18 (30 May 2021 06:05) (16 - 26)  SpO2: 99% (30 May 2021 06:05) (95% - 100%)          I&O's Summary    Daily     Daily Weight in k.1 (29 May 2021 16:43)    PHYSICAL EXAMINATION:  CONSTITUTIONAL: NAD, well-developed  RESPIRATORY: Normal respiratory effort; lungs are clear to auscultation bilaterally  CARDIOVASCULAR: Regular rate and rhythm, normal S1 and S2, no murmur/rub/gallop; No lower extremity edema;  BREAST: No nodules palpated. No nipple discharges, no axillary or supraclavicular lymphadenopathy  ABDOMEN: Nontender to palpation, normoactive bowel sounds, no rebound/guarding; No Hall's  MUSCULOSKELETAL: no clubbing or cyanosis of digits; no joint swelling or tenderness to palpation  PSYCH: A+O to person, place, and time; affect appropriate; emotional during interview  NEURO: Non-focal, no tremors; (+) R inferior quadrant visual cut b/l  SKIN: No rashes    LABS:                          11.4   5.20  )-----------( 129      ( 30 May 2021 07:13 )             34.6         138  |  102  |  5<L>  ----------------------------<  119<H>  3.0<L>   |  22  |  0.59    Ca    8.9      29 May 2021 04:24  Phos  2.8       Mg     1.7         TPro  x   /  Alb  x   /  TBili  x   /  DBili  2.3<H>  /  AST  x   /  ALT  x   /  AlkPhos  x       LIVER FUNCTIONS - ( 29 May 2021 04:24 )  Alb: 3.2 g/dL / Pro: 7.2 g/dL / ALK PHOS: 95 U/L / ALT: 32 U/L / AST: 58 U/L / GGT: x           PT/INR - ( 30 May 2021 07:13 )   PT: 20.4 sec;   INR: 1.83 ratio         PTT - ( 30 May 2021 07:13 )  PTT:39.4 sec        Urinalysis Basic - ( 28 May 2021 17:53 )    Color: Terese / Appearance: Slightly Turbid / S.023 / pH: x  Gluc: x / Ketone: Negative  / Bili: Small / Urobili: 12 mg/dL   Blood: x / Protein: 30 mg/dL / Nitrite: Negative   Leuk Esterase: Negative / RBC: 3 /HPF / WBC 3 /HPF   Sq Epi: x / Non Sq Epi: 3 /HPF / Bacteria: Few        TELEMETRY:     EKG:     IMAGING:    < from: CT Head No Cont (21 @ 09:23) >    IMPRESSION:    1)  subacute left occipital hemorrhage with vasogenic edema in the left parietal-occipital region. A hemorrhagic metastasis is within the differential. Further workup and assessment recommended. Follow-up MR imaging with andwithout gadolinium may be considered for further evaluation.  2)  no significant midline shift shift or hydrocephalus.    No significant change when compared with prior CT.    < end of copied text >

## 2021-05-30 NOTE — DISCHARGE NOTE PROVIDER - NSDCMRMEDTOKEN_GEN_ALL_CORE_FT
folic acid 1 mg oral tablet: 1 tab(s) orally once a day  Multiple Vitamins oral tablet: 1 tab(s) orally once a day  tenofovir disoproxil fumarate 300 mg oral tablet: 1 tab(s) orally once a day

## 2021-05-30 NOTE — PROGRESS NOTE ADULT - PROBLEM SELECTOR PLAN 3
Empirically on CIWA: patient denied heavy ETOH use, but daughter reports patient is drinking heavily  Can d/c after 72 hours s/p CIWA: patient denied heavy ETOH use, but daughter reports patient is drinking heavily  Can d/c after 72 hours  c/w thiamine, folic acid

## 2021-05-30 NOTE — DISCHARGE NOTE PROVIDER - NS AS DC PROVIDER CONTACT Y/N MULTI
Received notification from Nikkie Fritz  that pt bed is being changed to  # [id number]. Same # for report. Yes

## 2021-05-30 NOTE — DISCHARGE NOTE PROVIDER - CARE PROVIDERS DIRECT ADDRESSES
,DirectAddress_Unknown,[email or web address] ,DirectAddress_Unknown,[email or web address],DirectAddress_Unknown

## 2021-05-30 NOTE — DISCHARGE NOTE PROVIDER - DETAILS OF MALNUTRITION DIAGNOSIS/DIAGNOSES
This patient has been assessed with a concern for Malnutrition and was treated during this hospitalization for the following Nutrition diagnosis/diagnoses:     -  05/30/2021: Severe protein-calorie malnutrition

## 2021-05-30 NOTE — PROGRESS NOTE ADULT - PROBLEM SELECTOR PLAN 2
CTAP with Indeterminate infiltrative liver mass; concerning for malignancy  - h/o alcohol use, no signs of withdrawal currently, can d/c CIWA after 72 hours  - CT revealed pulmonary nodules, liver mass, and possible hemorrhagic brain mets; Will need malignancy workup  - Liver dysfunction with INR 2, albumin 3.2 and mildly elevated Tbili (4)  - obtain MRI for further eval  - will likely need liver biopsy  - Diet as tolerated    Malignancy Screening:  Hgb 13.6 on admission  Cancer hx: no personal hx of cancers; 2 sisters passed with gastric cancer, daughter has breast cancer  Cancer screening:  Mammo and US wnl last year, but has had multiple workup/biopsies in the past for abnormal findings on mammo (all benign findings);   Last colonoscopy 3 years ago, normal finding per report  Never had EGD  PAP smear last year, reportedly normal CTAP with Indeterminate infiltrative liver mass; concerning for malignancy  - h/o alcohol use, no signs of withdrawal currently, can d/c CIWA  - CT revealed pulmonary nodules, liver mass, and possible hemorrhagic brain mets; Will need malignancy workup  - MR A/P for better visualization  - IR consult for liver biopsy  - Liver dysfunction with INR 2, albumin 3.2 and mildly elevated Tbili (4), transfuse for platelets <7, trend INR  - Diet as tolerated    Malignancy Screening:  Hgb 13.6 on admission  Cancer hx: no personal hx of cancers; 2 sisters passed with gastric cancer, daughter has breast cancer  Cancer screening:  Mammo and US wnl last year, but has had multiple workup/biopsies in the past for abnormal findings on mammo (all benign findings);   Last colonoscopy 3 years ago, normal finding per report  Never had EGD  PAP smear last year, reportedly normal CTAP with Indeterminate infiltrative liver mass; concerning for malignancy  - h/o alcohol use, no signs of withdrawal currently, can d/c CIWA  - CT revealed pulmonary nodules, liver mass, and possible hemorrhagic brain mets; Will need malignancy workup  - MR A/P for better visualization  - IR consult for liver biopsy  - History of alcohol abuse, liver dysfunction with INR 2, albumin 3.2 and mildly elevated Tbili (4), transfuse for platelets <7, trend INR. No radiographic evidence of cirrhosis at this time.   - Diet as tolerated    Malignancy Screening:  Hgb 13.6 on admission  Cancer hx: no personal hx of cancers; 2 sisters passed with gastric cancer, daughter has breast cancer  Cancer screening:  Mammo and US wnl last year, but has had multiple workup/biopsies in the past for abnormal findings on mammo (all benign findings);   Last colonoscopy 3 years ago, normal finding per report  Never had EGD  PAP smear last year, reportedly normal

## 2021-05-30 NOTE — PROGRESS NOTE ADULT - ATTENDING COMMENTS
65F with no known PMH who presented to the ED with headache and vision changes, found to have an intraparenchymal hemorrhage on CTH and indeterminate infiltrative liver mass concerning for neoplastic process.    # ICH - left occipital hemorrhage with vasogenic edema in the left parietal-occipital region stable on repeat CT. Hemorrhagic lesion from metastasis remains high on differential. Will need MRI.   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

## 2021-05-30 NOTE — CHART NOTE - NSCHARTNOTEFT_GEN_A_CORE
Neurology was contacted by primary team in regards to possible steroid administration for patient.  At this time, it is unclear what steroids would be treating.    Advised to obtain MRI brain w/ and w/o contrast as well as MR Orbits w/ and w/o contrast to assess if there are any lesions that may be contributing to patient's blurry vision.   Continue malignancy workup of liver mass as per primary team.     Further recommendations to be guided by above imaging.

## 2021-05-30 NOTE — DISCHARGE NOTE PROVIDER - NSDCCPCAREPLAN_GEN_ALL_CORE_FT
PRINCIPAL DISCHARGE DIAGNOSIS  Diagnosis: Intracranial hemorrhage  Assessment and Plan of Treatment: You came in with blurry vision and headache, found to have a bleed in the brain. Your neurologic exam remained stable, you were started on keppra (anti-seizure medication) for seizure prevention. We are concerned this bleed occured as a result of metastatic lesions to the brain in setting of suspected cancer.      SECONDARY DISCHARGE DIAGNOSES  Diagnosis: Liver mass  Assessment and Plan of Treatment: You were found to have a liver mass, and underwent biopsy which revealed: There is evidence of metastatic disease in the lung and PENDING     PRINCIPAL DISCHARGE DIAGNOSIS  Diagnosis: Intracranial hemorrhage  Assessment and Plan of Treatment: You came in with blurry vision and headache, found to have a bleed in the brain. Your neurologic exam remained stable. The MRI that was done showed the bleed but no tumors were seen. Unfortunately the blood was obscuring some areas of the brain so they could not definitively conclude that there is no tumor or infection there. You will need to repeat the MRI in 4-6 weeks. please follow up with Dr Valadez as well an ophthalmologist. If you develop worsening headaches or neurologic symptoms (numbness, weakness, change in sensation, seizures) please come back to the hospital right away.      SECONDARY DISCHARGE DIAGNOSES  Diagnosis: Cirrhosis  Assessment and Plan of Treatment: Your MRI findings were concerning for cirrhosis which is scarring of the liver. In your case it was likely caused by a combination of chronic hepatitis and alcohol use. Please continue taking the tenofovir and discontinue all alcohol use. Please consult your doctor before starting any new medications or supplements. Please follow up with Dr Mckinney.     PRINCIPAL DISCHARGE DIAGNOSIS  Diagnosis: Intracranial hemorrhage  Assessment and Plan of Treatment: You came in with blurry vision and headache, found to have a bleed in the brain. Your neurologic exam remained stable. The MRI that was done showed the bleed but no tumors were seen. Unfortunately the blood was obscuring some areas of the brain so they could not definitively conclude that there is no tumor or infection there. You will need to repeat the MRI in 4-6 weeks. please follow up with Dr Valadez as well an ophthalmologist. If you develop worsening headaches or neurologic symptoms (numbness, weakness, change in sensation, seizures) please come back to the hospital right away.      SECONDARY DISCHARGE DIAGNOSES  Diagnosis: Cirrhosis  Assessment and Plan of Treatment: Your MRI findings were concerning for cirrhosis which is scarring of the liver. In your case it was likely caused by a combination of chronic hepatitis and alcohol use. Please continue taking the tenofovir and discontinue all alcohol use. Please consult your doctor before starting any new medications or supplements. Please follow up with Dr Mckinney.    Diagnosis: Lung nodules  Assessment and Plan of Treatment: You had several tiny nodules in your lung. You should just make sure that you tell your primary care doctor so that they can follow this up and get repeat imaging as needed. The size of these nodules precludes the need for any additional workup at this time.     PRINCIPAL DISCHARGE DIAGNOSIS  Diagnosis: Intracranial hemorrhage  Assessment and Plan of Treatment: You came in with blurry vision and headache, found to have a bleed in the brain. Your neurologic exam remained stable. The MRI that was done showed the bleed but no tumors were seen. Unfortunately the blood was obscuring some areas of the brain so they could not definitively conclude that there is no tumor or infection there. You will need to repeat the MRI in 4-6 weeks. Please follow up with Dr Valadez as well an ophthalmologist. If you develop worsening headaches or neurologic symptoms (numbness, weakness, change in sensation, seizures) please come back to the hospital right away.      SECONDARY DISCHARGE DIAGNOSES  Diagnosis: Cirrhosis  Assessment and Plan of Treatment: Your MRI findings were concerning for cirrhosis which is scarring of the liver. In your case it was likely caused by a combination of chronic hepatitis and alcohol use. Please continue taking the tenofovir and discontinue all alcohol use. Please consult your doctor before starting any new medications or supplements. You will also need to repeat the MRI in 3-6 months to ensure stability. Please follow up with Dr Mckinney.    Diagnosis: Lung nodules  Assessment and Plan of Treatment: You had several tiny nodules in your lung. You should just make sure that you tell your primary care doctor so that they can follow this up and get repeat imaging as needed. The size of these nodules precludes the need for any additional workup at this time.

## 2021-05-30 NOTE — DISCHARGE NOTE PROVIDER - NSDCFUADDINST_GEN_ALL_CORE_FT
Please make appointment to see your PCP within 2 weeks of discharge. Please also call the numbers above to follow up with the gastroenterologist (Faye) and neurologist (Rory). You should also make an appointment or get a referral to see an ophthalmologist (eye doctor). Please also call the numbers above to follow up with the gastroenterologist (Faye) and neurologist (Rory). You should also make an appointment or get a referral to see an ophthalmologist (eye doctor).    Please try to eat leafy green vegetables such as spinach as these can help reduce the risk of bleeding by giving you vitamin K.

## 2021-05-30 NOTE — DIETITIAN INITIAL EVALUATION ADULT. - PERTINENT MEDS FT
MEDICATIONS  (STANDING):  folic acid 1 milliGRAM(s) Oral daily  levETIRAcetam 500 milliGRAM(s) Oral two times a day  multivitamin 1 Tablet(s) Oral daily  potassium chloride    Tablet ER 40 milliEquivalent(s) Oral every 4 hours  thiamine 100 milliGRAM(s) Oral daily    MEDICATIONS  (PRN):  LORazepam   Injectable 2 milliGRAM(s) IV Push every 2 hours PRN Symptom-triggered: 2 point increase in CIWA -Ar score and a total score of 7 or LESS

## 2021-05-30 NOTE — PROGRESS NOTE ADULT - PROBLEM SELECTOR PLAN 1
Patient with 2.4 x 3.0 x 2.4 cm left occipital acute intraparenchymal hemorrhage with associated vasogenic edema and mass effect, no midline shift. CTA head/neck 5 hrs later with unchanged left occipital acute intraparenchymal hemorrhage. No active bleeding  - Patient evaluated by neurosurgery no surgical intervention indicated, s/p Q1hr neuro checks while in MICU, exam stable, CT head stable,  transferred to medicine floors 5/29  - INR ~2 (likely iso liver dysfunction), s/p vitamin K  - s/p Keppra 1g in the ED; c/w Keppra 500mg BID  - Neuro following, will determine the need for steroid after reviewing repeat CTH  - MRI brain wwo C for stroke workup and rule out underlying lesion  - f/u VEEG Patient with 2.4 x 3.0 x 2.4 cm left occipital acute intraparenchymal hemorrhage with associated vasogenic edema and mass effect, no midline shift. CTA head/neck 5 hrs later with unchanged left occipital acute intraparenchymal hemorrhage. No active bleeding  - Patient evaluated by neurosurgery no surgical intervention indicated, s/p Q1hr neuro checks while in MICU, exam stable, CT head stable,  transferred to medicine floors 5/29  - INR ~2 (likely iso liver dysfunction), s/p vitamin K, downtrending  - platelets goal >100  - s/p Keppra 1g in the ED; c/w Keppra 500mg BID  - Neuro following, will determine the need for steroid after reviewing repeat CTH  - MRI brain wwo C for stroke workup and rule out underlying lesion  - f/u VEEG Patient with 2.4 x 3.0 x 2.4 cm left occipital acute intraparenchymal hemorrhage with associated vasogenic edema and mass effect, no midline shift. CTA head/neck 5 hrs later with unchanged left occipital acute intraparenchymal hemorrhage. No active bleeding  - Patient evaluated by neurosurgery no surgical intervention indicated, s/p Q1hr neuro checks while in MICU, exam stable, CT head stable,  transferred to medicine floors 5/29  - INR ~2 (likely iso liver dysfunction), s/p vitamin K, downtrending  - platelets goal >100  - s/p Keppra 1g in the ED; c/w Keppra 500mg BID  - Neuro following, will determine the need for steroid after reviewing repeat CTH- called neurology 5/30, no indication at this time for steroids, will follow up MRI brain and orbits to re-assess need for steroids (ie, if there is a demyelinating process, etc).  - MRI brain and orbits wwo C for stroke workup and rule out underlying lesion  - f/u VEEG

## 2021-05-30 NOTE — DISCHARGE NOTE PROVIDER - NSDCFUADDAPPT_GEN_ALL_CORE_FT
You can call the following number: [phone] to find a primary care physician within [facility] in your area. Please try to see them within the next two weeks if possible. You can call the following number: [phone] to find a primary care physician within [facility] in your area. Please try to see them within the next two weeks if possible.

## 2021-05-30 NOTE — DIETITIAN INITIAL EVALUATION ADULT. - OTHER INFO
Per chart, patient is a 65F with no known PMH who presented to the ED with headache and vision changes, found to have an intraparenchymal hemorrhage on CTH and indeterminate infiltrative liver mass concerning for metastatic neoplasm, s/p MICU stay for q1 hour neuro checks.     RDN met with patient, daughter, and cousin at bedside today. Per daughter, patient's PO intake gradually declined over the past year, has been eating a small amount of food at each meal, with wt loss of ~20-30# over the past 3 months, does not like to eat vegetables, does not take any oral nutrition supplement at home but is amenable to try it to optimize po intake -- will send [employer] Vegan Shake x3 daily and magic cup at lunch as discussed with patient and daughter. Noted EtOH abuse with "a gallon of wine in a day" per daughter report. c/w Multivitamin, thiamine, folic acid per MD order. Confirmed NKFA with patient. Dislikes mushrooms -- honored. Otherwise, patient denies chewing/swallowing difficulties on current diet consistency, noted pending SLP swallow eval due to stroke. Patient also denies GI distress (nausea/vomiting/diarrhea/constipation) at this time. RDN encouraged po intake as tolerated and incorporate a variety of food items into the diet, as well as increase intake of HBV protein, patient and daughter verbalized understanding.     Skin: No pressure injuries  Edema: None noted.

## 2021-05-30 NOTE — DISCHARGE NOTE PROVIDER - PROVIDER TOKENS
PROVIDER:[TOKEN:[[id number]]],PROVIDER:[TOKEN:[[id number]]] PROVIDER:[TOKEN:[[id number]]],PROVIDER:[TOKEN:[[id number]]],PROVIDER:[TOKEN:[[id number]],FOLLOWUP:[1 week]]

## 2021-05-30 NOTE — DISCHARGE NOTE PROVIDER - HOSPITAL COURSE
65 year old woman, alcohol abuse (1 gallon of wine a day), who presented to the ED with headache and vision changes worsening over two weeks, patient also endorses diffuse abdominal pain, night sweats, 20-30 pound unintentional weight loss over the past 3 months, increasing weakness needing assistance with ambulation. On presentation, patient noted to have 2.4 x 3.0 x 2.4 cm left occipital acute intraparenchymal hemorrhage with associated vasogenic edema and mass effect, no midline shift. Patient admitted to ICU for q1 hour neuro checks, CTA head/neck 5 hrs later and 24 hour CT head unchanged, transferred to medicine floors. Patient started on Keppra 500mg bid for seizure prophylaxis, followed by neurology. CT A/P revealed poorly defined liver lesions, also pulmonary nodules on CT chest, there is concern for metastatic lesions in the brain. Patient is pending MRI of brain and orbits. Patient is pending IR biopsy of liver lesions.   Placed on symptom triggered CIWA to monitor for alcohol withdrawal. Social work (daughter concerned about declining mental status, alcohol abuse, living conditions) and SBIRT counselor pending.   65 year old woman with PMH of alcohol use presented to the ED for headache and vision changes and was found on CT head to have a L occipital lobe acute hemorrhage. She briefly was observed in the ICU with q1hr neuro checks but her exam was stable and repeat imaging showed no changes so she was stepped down to the floors. Given recent unintentional weight loss a malignancy workup was started and patient was found to have pulmonary nodules and an ill-defined liver lesion on CT. MRI of the liver was done which did not show a lesion but did have an area of decreased perfusion concerning for early cirrhotic changes. This was consistent with her labs which showed elevated INR, low platelets, elevated Tbili, and mildly elevated AST/ALT. 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 She had an MRI of the brain/orbits w/wo IV contrast which revealed L occipital lobe hemorrhage but was unable to comment on underlying architecture given that old blood was obscuring parenchyma. EEG and Echo were completed without embolic source of stroke or epileptiform activity. Patient needs follow up scans of liver (4-6 months) and brain (4-6 weeks). Patient was medically optimized, stable and ready for discharge. Plan of care and return precautions were discussed with the patient who verbally stated understanding.

## 2021-05-31 DIAGNOSIS — B19.10 UNSPECIFIED VIRAL HEPATITIS B WITHOUT HEPATIC COMA: ICD-10-CM

## 2021-05-31 LAB
ALBUMIN SERPL ELPH-MCNC: 3 G/DL — LOW (ref 3.3–5)
ALP SERPL-CCNC: 104 U/L — SIGNIFICANT CHANGE UP (ref 40–120)
ALT FLD-CCNC: 31 U/L — SIGNIFICANT CHANGE UP (ref 4–33)
ANION GAP SERPL CALC-SCNC: 12 MMOL/L — SIGNIFICANT CHANGE UP (ref 7–14)
APTT BLD: 38.3 SEC — HIGH (ref 27–36.3)
AST SERPL-CCNC: 56 U/L — HIGH (ref 4–32)
BASOPHILS # BLD AUTO: 0.03 K/UL — SIGNIFICANT CHANGE UP (ref 0–0.2)
BASOPHILS NFR BLD AUTO: 0.6 % — SIGNIFICANT CHANGE UP (ref 0–2)
BILIRUB SERPL-MCNC: 3.6 MG/DL — HIGH (ref 0.2–1.2)
BUN SERPL-MCNC: 5 MG/DL — LOW (ref 7–23)
CALCIUM SERPL-MCNC: 8.8 MG/DL — SIGNIFICANT CHANGE UP (ref 8.4–10.5)
CHLORIDE SERPL-SCNC: 106 MMOL/L — SIGNIFICANT CHANGE UP (ref 98–107)
CO2 SERPL-SCNC: 22 MMOL/L — SIGNIFICANT CHANGE UP (ref 22–31)
CREAT SERPL-MCNC: 0.61 MG/DL — SIGNIFICANT CHANGE UP (ref 0.5–1.3)
EOSINOPHIL # BLD AUTO: 0.12 K/UL — SIGNIFICANT CHANGE UP (ref 0–0.5)
EOSINOPHIL NFR BLD AUTO: 2.2 % — SIGNIFICANT CHANGE UP (ref 0–6)
GLUCOSE SERPL-MCNC: 118 MG/DL — HIGH (ref 70–99)
HCT VFR BLD CALC: 35.8 % — SIGNIFICANT CHANGE UP (ref 34.5–45)
HGB BLD-MCNC: 11.9 G/DL — SIGNIFICANT CHANGE UP (ref 11.5–15.5)
IANC: 3.32 K/UL — SIGNIFICANT CHANGE UP (ref 1.5–8.5)
IMM GRANULOCYTES NFR BLD AUTO: 0.2 % — SIGNIFICANT CHANGE UP (ref 0–1.5)
INR BLD: 1.84 RATIO — HIGH (ref 0.88–1.16)
LYMPHOCYTES # BLD AUTO: 1.25 K/UL — SIGNIFICANT CHANGE UP (ref 1–3.3)
LYMPHOCYTES # BLD AUTO: 23.4 % — SIGNIFICANT CHANGE UP (ref 13–44)
MAGNESIUM SERPL-MCNC: 1.8 MG/DL — SIGNIFICANT CHANGE UP (ref 1.6–2.6)
MCHC RBC-ENTMCNC: 32.6 PG — SIGNIFICANT CHANGE UP (ref 27–34)
MCHC RBC-ENTMCNC: 33.2 GM/DL — SIGNIFICANT CHANGE UP (ref 32–36)
MCV RBC AUTO: 98.1 FL — SIGNIFICANT CHANGE UP (ref 80–100)
MONOCYTES # BLD AUTO: 0.61 K/UL — SIGNIFICANT CHANGE UP (ref 0–0.9)
MONOCYTES NFR BLD AUTO: 11.4 % — SIGNIFICANT CHANGE UP (ref 2–14)
NEUTROPHILS # BLD AUTO: 3.32 K/UL — SIGNIFICANT CHANGE UP (ref 1.8–7.4)
NEUTROPHILS NFR BLD AUTO: 62.2 % — SIGNIFICANT CHANGE UP (ref 43–77)
NRBC # BLD: 0 /100 WBCS — SIGNIFICANT CHANGE UP
NRBC # FLD: 0 K/UL — SIGNIFICANT CHANGE UP
PHOSPHATE SERPL-MCNC: 2.2 MG/DL — LOW (ref 2.5–4.5)
PLATELET # BLD AUTO: 121 K/UL — LOW (ref 150–400)
POTASSIUM SERPL-MCNC: 4.5 MMOL/L — SIGNIFICANT CHANGE UP (ref 3.5–5.3)
POTASSIUM SERPL-SCNC: 4.5 MMOL/L — SIGNIFICANT CHANGE UP (ref 3.5–5.3)
PROT SERPL-MCNC: 6.6 G/DL — SIGNIFICANT CHANGE UP (ref 6–8.3)
PROTHROM AB SERPL-ACNC: 20.4 SEC — HIGH (ref 10.6–13.6)
RBC # BLD: 3.65 M/UL — LOW (ref 3.8–5.2)
RBC # FLD: 14.9 % — HIGH (ref 10.3–14.5)
SODIUM SERPL-SCNC: 140 MMOL/L — SIGNIFICANT CHANGE UP (ref 135–145)
WBC # BLD: 5.34 K/UL — SIGNIFICANT CHANGE UP (ref 3.8–10.5)
WBC # FLD AUTO: 5.34 K/UL — SIGNIFICANT CHANGE UP (ref 3.8–10.5)

## 2021-05-31 PROCEDURE — 99233 SBSQ HOSP IP/OBS HIGH 50: CPT | Mod: GC

## 2021-05-31 PROCEDURE — 93306 TTE W/DOPPLER COMPLETE: CPT | Mod: 26

## 2021-05-31 PROCEDURE — 99223 1ST HOSP IP/OBS HIGH 75: CPT

## 2021-05-31 PROCEDURE — 99446 NTRPROF PH1/NTRNET/EHR 5-10: CPT

## 2021-05-31 RX ADMIN — Medication 1 MILLIGRAM(S): at 12:10

## 2021-05-31 RX ADMIN — LEVETIRACETAM 500 MILLIGRAM(S): 250 TABLET, FILM COATED ORAL at 05:38

## 2021-05-31 RX ADMIN — Medication 1 TABLET(S): at 12:10

## 2021-05-31 RX ADMIN — Medication 63.75 MILLIMOLE(S): at 17:28

## 2021-05-31 RX ADMIN — Medication 100 MILLIGRAM(S): at 12:10

## 2021-05-31 NOTE — PROGRESS NOTE ADULT - ASSESSMENT
64 yo RH AAF p/w HA and vision changes.  found to have L occipital IPH. on exam with R HHA.   repeat CTH stable 5/29. also found to have liver mass. INR was ~2 on arrival s/p vit K. now on keppra. CTA H/N neg   Etiology of IPH may be 2/2 HTN, mets, CAA needs further workup.   - MRI brain w/ and w/o  - MRI orbits   - no role for steroids from neurostandpoint  unless mass found intracranially   - SBP<160  - on keppra 500mg BID but no seizures noted. no need for AED unless has seizure  - rEEG  - cancer workup given pulmonary nodules and liver mass.    - PT/OT  - A1c and lipids  - TTE/tele   - check FS, glucose control <180  - GI/DVT ppx  - Counseling on diet, exercise, and medication adherence was done  - Counseling on smoking cessation and alcohol consumption offered when appropriate.  - Pain assessed and judicious use of narcotics when appropriate was discussed.    - Stroke education given when appropriate.  - Importance of fall prevention discussed.   - Differential diagnosis and plan of care discussed with patient and/or family and primary team  - Thank you for allowing me to participate in the care of this patient. Call with questions.   Suleman Valadez MD  Vascular Neurology .

## 2021-05-31 NOTE — CONSULT NOTE ADULT - ASSESSMENT
Assessment/Plan:   65y Female with intracranial hemorrhage found to have infiltration of the liver on CT.   - would recommend obtaining multiphase MRI (liver protocol if possible) to further evaluate prior to committing to biopsy, given inherent risk of bleeding, tumor seeding associated with liver lesion biopsy if malignant.  - please consult IR again once MRI has been performed.   - case discussed with Dr. Macedo.

## 2021-05-31 NOTE — PROGRESS NOTE ADULT - ATTENDING COMMENTS
65F with no known PMH who presented to the ED with headache and vision changes, found to have an intraparenchymal hemorrhage on CTH and indeterminate infiltrative liver mass concerning for neoplastic process.    # ICH - left occipital hemorrhage with vasogenic edema in the left parietal-occipital region stable on repeat CT. Hemorrhagic lesion from metastasis remains high on differential. Will need MRI brain.   # Liver Mass - HBV antigen positive raising concern for HCC, HBV viral load pending, remainder of hep panel sent. Will need MRI abdomen to further assess.   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 s/p Vit K, continue to monitor INR  # Pulmonary Nodules - in setting of above concerning for metastatic lesions    Discussed with HS5

## 2021-05-31 NOTE — PHYSICAL THERAPY INITIAL EVALUATION ADULT - PERTINENT HX OF CURRENT PROBLEM, REHAB EVAL
This is a 65y F with no known PMH came with headache and vision changes. Patient found to have an intraparenchymal hemorrhage on CTH and indeterminate infiltrative liver mass concerning for metastatic neoplasm.

## 2021-05-31 NOTE — PROGRESS NOTE ADULT - PROBLEM SELECTOR PLAN 4
s/p CIWA: patient denied heavy ETOH use, but daughter reports patient is drinking heavily  Can d/c after 72 hours  c/w thiamine, folic acid s/p CIWA: patient denied heavy ETOH use, but daughter reports patient is drinking heavily  [email or web address] [email or web address]  c/w thiamine, folic acid

## 2021-05-31 NOTE — PROGRESS NOTE ADULT - PROBLEM SELECTOR PLAN 3
hepatitis panel with + HBsAg -HBsAb -HBcAb  -f/u e-antigen and viral load  -ID/hepatology c/s  -f/u HIV hepatitis panel with + HBsAg -HBsAb -HBcAb IgM  -f/u e-antigen/antibody, core IgG, delta agent, and viral load  -hepatology c/s  -f/u HIV

## 2021-05-31 NOTE — PROGRESS NOTE ADULT - PROBLEM SELECTOR PLAN 2
CTAP with Indeterminate infiltrative liver mass; concerning for malignancy  - h/o alcohol use, no signs of withdrawal currently, can d/c CIWA  - CT revealed pulmonary nodules, liver mass, and possible hemorrhagic brain mets; Will need malignancy workup  - MR A/P for better visualization  - IR to assess for liver biopsy based on MRI  - acute hepatitis panel concerning for active HBV    Malignancy Screening:  Hgb 13.6 on admission  Cancer hx: no personal hx of cancers; 2 sisters passed with gastric cancer, daughter has breast cancer  Cancer screening:  Mammo and US wnl last year, but has had multiple workup/biopsies in the past for abnormal findings on mammo (all benign findings);   Last colonoscopy 3 years ago, normal finding per report  Never had EGD  PAP smear last year, reportedly normal

## 2021-05-31 NOTE — PHYSICAL THERAPY INITIAL EVALUATION ADULT - LIVES WITH, PROFILE
mom and sisters at home , 5 steps to enter with handrails  and 1 flight of steps to the bedroom with handrails

## 2021-05-31 NOTE — PROGRESS NOTE ADULT - SUBJECTIVE AND OBJECTIVE BOX
*******************************************************  Pedro Navarro MD PGY-1  Internal Medicine  Pager ([facility]) [phone] / [phone]  *******************************************************  Patient is a 65y old  Female who presents with a chief complaint of Intraparenchymal Hemorrhage ICH (31 May 2021 09:08)        SUBJECTIVE / OVERNIGHT EVENTS:  - No acute events overnight.   - Patient seen and evaluated at bedside.  - No complaints this AM  - ROS: Denies fevers/chills, headache, SOB at rest, cough, chest pain, palpitations, abdominal pain, nausea/vomiting/diarrhea/constipation, melena/hematochezia, dysuria, and hematuria    ------------------------------------------------------------------------------------------------------------    MEDICATIONS  (STANDING):  folic acid 1 milliGRAM(s) Oral daily  levETIRAcetam 500 milliGRAM(s) Oral two times a day  multivitamin 1 Tablet(s) Oral daily  thiamine 100 milliGRAM(s) Oral daily    MEDICATIONS  (PRN):  LORazepam   Injectable 2 milliGRAM(s) IV Push every 2 hours PRN Symptom-triggered: 2 point increase in CIWA -Ar score and a total score of 7 or LESS      ------------------------------------------------------------------------------------------------------------    OBJECTIVE:    CAPILLARY BLOOD GLUCOSE        I&O's Summary    Daily     Daily   Weight (kg): 54 (05-29-21 @ 02:43)    PHYSICAL EXAM:  T(F): 98.7, Max: 98.7 (05-30-21 @ 21:00)  HR: 79 (71 - 86)  BP: 135/60 (129/64 - 136/73)  RR: 17 (17 - 18)  SpO2: 100% (95% - 100%)      CONSTITUTIONAL: NAD, well-developed  HEENT: NCAT, EOMI, PERRLA, no scleral icterus, MMM  RESPIRATORY/CHEST: Normal respiratory effort; lungs are clear to auscultation bilaterally; no wheezing/crackles  CARDIO: Regular rate, normal S1 and S2, +systolic 2/6 murmur. No rub/gallop; No JVD  VASCULAR: No lower extremity edema; Peripheral pulses are 2+ bilaterally; Capillary refill brisk  ABDOMEN: Soft, nontender to palpation, normoactive bowel sounds, no rebound/guarding; No hepatosplenomegaly  MUSCULOSKELETAL: no joint swelling or tenderness to palpation, full strength all extremities.  EXTREMITIES: hands/feet are warm, and without cyanosis or clubbing  SKIN: no visible rashes, pallor, diaphoresis, or jaundice  NEURO: No focal deficits; moving all extremities  PSYCH: A+O to person, place, and time; affect appropriate; cooperative    ------------------------------------------------------------------------------------------------------------  LABS:                        11.9   5.34  )-----------( 121      ( 31 May 2021 06:33 )             35.8     05-31    140  |  106  |  5<L>  ----------------------------<  118<H>  4.5   |  22  |  0.61    Ca    8.8      31 May 2021 06:33  Phos  2.2     05-31  Mg     1.8     05-31    TPro  6.6  /  Alb  3.0<L>  /  TBili  3.6<H>  /  DBili  x   /  AST  56<H>  /  ALT  31  /  AlkPhos  104  05-31    PT/INR - ( 31 May 2021 07:57 )   PT: 20.4 sec;   INR: 1.84 ratio         PTT - ( 31 May 2021 07:57 )  PTT:38.3 sec            RADIOLOGY & ADDITIONAL TESTS:  Results Reviewed:     Imaging Personally Reviewed:  Electrocardiogram Personally Reviewed:      COORDINATION OF CARE:  Care discussed with consultants/other providers and notes reviewed [Y]:   ------------------------------------------------------------------------------------------------------------

## 2021-05-31 NOTE — CONSULT NOTE ADULT - ATTENDING COMMENTS
Hepatology Staff: Alondra Diego MD    I saw and examined the patient along with  Dr. Lara on 05-31-21 @ 11:36  Patient Medical Record, hosptial course was reviewed and summarized as below:    Vitals: Vital Signs Last 24 Hrs  T(C): 37 (31 May 2021 10:09), Max: 37.1 (30 May 2021 21:00)  T(F): 98.6 (31 May 2021 10:09), Max: 98.7 (30 May 2021 21:00)  HR: 77 (31 May 2021 10:09) (71 - 86)  BP: 142/65 (31 May 2021 10:09) (129/64 - 142/65)  BP(mean): 77 (31 May 2021 10:09) (77 - 85)  RR: 18 (31 May 2021 10:09) (17 - 18)  SpO2: 98% (31 May 2021 10:09) (95% - 100%)    Labs:INR: 1.84 ratio (05-31-21 @ 07:57)  Bilirubin Total, Serum: 3.6 mg/dL (05-31-21 @ 06:33)  Creatinine, Serum: 0.61 mg/dL (05-31-21 @ 06:33)    I/O: I&O's Summary    Nutritional Status:   Albumin, Serum: 3.0 g/dL (05-31-21 @ 06:33)    Recommendations: This is a 65-year-old female who initially presented with intraparenchymal hemorrhage and further evaluation during hospital course was significant for an infiltrative liver mass as well as coagulopathy and positive hepatitis B surface antigen status.  Concerning for hepatocellular carcinoma.  Would recommend alpha-fetoprotein and MRI of the abdomen with and without contrast.  Also recommend hepatitis B virus DNA level, hepatitis B E antigen and hepatitis B E antibody.  Hepatology team will follow up with these results for further recommendation.      Plan discussed with Primary team.
seen in ICU  Briefly 64 yo RH AAF p/w HA and vision changes.  found to have L occipital IPH. on exam with R HHA.   repeat CTH stable. also found to have liver mass. INR was ~2 on arrival s/p vit K. now on keppra. CTA H/N neg   Etiology of IPH may be 2/2 HTN, mets, CAA needs further workup.   - MRI brain w/ and w/o  - SBP<160  - on keppra 500mg BID but no seizures noted. no need for AED unless has seizure  - rEEG  - cancer workup given pulmonary nodules and liver mass.    - PT/OT  - A1c and lipids  - TTE/tele   - can likely transfer out of ICU if stable today  Suleman Valadez MD  Vascular Neurology

## 2021-05-31 NOTE — PHYSICAL THERAPY INITIAL EVALUATION ADULT - PRECAUTIONS/LIMITATIONS, REHAB EVAL
aspiration precautions/fall precautions/seizure precautions Drysol Counseling:  I discussed with the patient the risks of drysol/aluminum chloride including but not limited to skin rash, itching, irritation, burning.

## 2021-05-31 NOTE — CONSULT NOTE ADULT - ASSESSMENT
66yo F with no known PMH who presented to the ED with headache and vision changes. Hepatology consulted for liver lesion and HBV.    # Infiltrative liver lesion - concerning for malignancy, likely HCC in the setting of chronic HBV. Would hold off from biopsy until MRI abdomen results.   # Elevated liver tests - mostly bilirubin elevation, potentially in the settings of an infiltrative disease vs. worsening hepatic dysfunction in the settings of chronic HBV/EtOH use (c/w elevated INR and bilirubin). No cirrhosis on imaging or evidence of portal hypertension.  # HBV - +HBVag, negative core IgM concerning for chronic HBV. Awaiting Core IgG, E Ag/Ab, and delta agent. Possibly contracted from  (who had a liver disease). No clear exposure.   # ICH - possibly in the settings of coagulopathy, stable    Recommendations:  - MRI liver protocol for HCC evaluation  - AFP  - Core IgG, E Ag/Ab, and delta agent pending  - HBV DNA  - Daily CMP, INR, CBC  - Thiamine, folate, MVI    Thank you for involving us in the care of this patient. Please reach out if any further questions.    Jabari Lara, PGY-4  Gastroenterology Fellow    Available on [employer] Teams  Pager [phone] ([facility]) or [phone] ([facility])  After 5PM/Weekends, please contact the on-call GI fellow: [phone]  Available through [employer] Teams

## 2021-05-31 NOTE — CONSULT NOTE ADULT - SUBJECTIVE AND OBJECTIVE BOX
Vascular & Interventional Radiology Brief Consult Note    Evaluate for Procedure: Liver biopsy    HPI: 65y Female admitted for intracranial hemorrhage found to have infiltrative lesion of the right hepatic lobe. Patient referred for biopsy.     Allergies:   Medications (Abx/Cardiac/Anticoagulation/Blood Products)      Data:    T(C): 37  HR: 77  BP: 142/65  RR: 18  SpO2: 98%    -WBC 5.34 / HgB 11.9 / Hct 35.8 / Plt 121  -Na 140 / Cl 106 / BUN 5 / Glucose 118  -K 4.5 / CO2 22 / Cr 0.61  -ALT 31 / Alk Phos 104 / T.Bili 3.6  -INR1.84    Imaging: CT abd/pelvis: Venous phase shows infiltrative disease of the liver involving the lateral segments. Etiology unclear.

## 2021-05-31 NOTE — PROGRESS NOTE ADULT - SUBJECTIVE AND OBJECTIVE BOX
Neurology Progress Note    S: Patient seen and examined. No new events overnight. patient denied CP, SOB, HA or pain. no change. pending MRI    Medication:  folic acid 1 milliGRAM(s) Oral daily  levETIRAcetam 500 milliGRAM(s) Oral two times a day  LORazepam   Injectable 2 milliGRAM(s) IV Push every 2 hours PRN  multivitamin 1 Tablet(s) Oral daily  thiamine 100 milliGRAM(s) Oral daily      Vitals:  Vital Signs Last 24 Hrs  T(C): 37.1 (31 May 2021 05:00), Max: 37.1 (30 May 2021 21:00)  T(F): 98.7 (31 May 2021 05:00), Max: 98.7 (30 May 2021 21:00)  HR: 79 (31 May 2021 05:00) (71 - 86)  BP: 135/60 (31 May 2021 05:00) (129/64 - 136/73)  BP(mean): 78 (31 May 2021 05:00) (78 - 85)  RR: 17 (31 May 2021 05:00) (17 - 18)  SpO2: 100% (31 May 2021 05:00) (95% - 100%)    General Exam:   General Appearance: Appropriately dressed and in no acute distress       Head: Normocephalic, atraumatic and no dysmorphic features  Ear, Nose, and Throat: Moist mucous membranes  CVS: S1S2+  Resp: No SOB, no wheeze or rhonchi  Abd: soft NTND  Extremities: No edema, no cyanosis  Skin: No bruises, no rashes     Neurological Exam:  Mental Status: Awake, alert and oriented x 3.  Able to follow simple and complex verbal commands. Able to name and repeat. fluent speech. No obvious aphasia or dysarthria noted.   Cranial Nerves: PERRL, EOMI, RHHA , sensation V1-V3 intact,  no obvious facial asymmetry , equal elevation of palate, scm/trap 5/5, tongue is midline on protrusion. no obvious papilledema on fundoscopic exam. Hearing is grossly intact.   Motor: Normal bulk, tone and strength throughout. Fine finger movements were intact and symmetric. no tremors or drift noted.    Sensation: Intact to light touch and pinprick throughout. no right/left confusion. no extinction to tactile on DSS.  Reflexes: 1+ throughout at biceps, brachioradialis, triceps, patellars and ankles bilaterally and equal. No clonus. R toe and L toe were both downgoing.  Coordination: No dysmetria on FNF   Gait: deferred     I personally reviewed the below data/images/labs:      CBC Full  -  ( 31 May 2021 06:33 )  WBC Count : 5.34 K/uL  RBC Count : 3.65 M/uL  Hemoglobin : 11.9 g/dL  Hematocrit : 35.8 %  Platelet Count - Automated : 121 K/uL  Mean Cell Volume : 98.1 fL  Mean Cell Hemoglobin : 32.6 pg  Mean Cell Hemoglobin Concentration : 33.2 gm/dL  Auto Neutrophil # : 3.32 K/uL  Auto Lymphocyte # : 1.25 K/uL  Auto Monocyte # : 0.61 K/uL  Auto Eosinophil # : 0.12 K/uL  Auto Basophil # : 0.03 K/uL  Auto Neutrophil % : 62.2 %  Auto Lymphocyte % : 23.4 %  Auto Monocyte % : 11.4 %  Auto Eosinophil % : 2.2 %  Auto Basophil % : 0.6 %    05-31    140  |  106  |  5<L>  ----------------------------<  118<H>  4.5   |  22  |  0.61    Ca    8.8      31 May 2021 06:33  Phos  2.2     05-31  Mg     1.8     05-31    TPro  6.6  /  Alb  3.0<L>  /  TBili  3.6<H>  /  DBili  x   /  AST  56<H>  /  ALT  31  /  AlkPhos  104  05-31    LIVER FUNCTIONS - ( 31 May 2021 06:33 )  Alb: 3.0 g/dL / Pro: 6.6 g/dL / ALK PHOS: 104 U/L / ALT: 31 U/L / AST: 56 U/L / GGT: x           PT/INR - ( 31 May 2021 07:57 )   PT: 20.4 sec;   INR: 1.84 ratio         PTT - ( 31 May 2021 07:57 )  PTT:38.3 sec    CT head w/o contrast (20:00): IMPRESSION:   2.4 x 3.0 x 2.4 cm left occipital acute intraparenchymal hemorrhage with associated vasogenic edema and mass effect as described. There is no midline shift.    Repeat CT head w/o contrast (2357):   CT brain: Unchanged left occipital acute intraparenchymal hemorrhage. No active bleeding.    CT angiography neck: No hemodynamically significant stenosis by NASCET criteria. No vascular dissection.    CT angiography brain: No major vessel occlusion or proximal stenosis. No aneurysm or vascular malformation.  < from: CT Head No Cont (05.29.21 @ 09:23) >    EXAM:  CT BRAIN        PROCEDURE DATE:  May 29 2021         INTERPRETATION:  INDICATION:  Left occipital hemorrhage. Follow-up.  TECHNIQUE:  A non contrast 2.5 or 3 mm axial CT study of the brain was performed from skull base to vertex. Coronal andsagittal reformations were generated from the axial data.  COMPARISON EXAMINATION:  CT dated 5/28/2021.    FINDINGS:    HEMISPHERES:  A subacute hematoma is again identified in the left occipital region. This is unchanged in size without evidence of further hemorrhage. There is underlying white matter edema in the left the parietal and occipital region with an appearance suggesting vasogenic edema. A hemorrhagic met, therefore, cannot be ruled out. Follow-up MR imaging with and without gadoliniumrecommended for further assessment along with further clinical correlation. There are no other areas of hemorrhage.  VENTRICLES:  Midline and normal in size.  POSTERIOR FOSSA:  The brain stem and cerebellum are unremarkable.  No CP angle lesion noted.  EXTRACEREBRAL SPACES:  No subdural or epidural collections are noted.  SKULL BASE AND CALVARIUM:  Appears intact.  No fracture or destructive lesion is identified.  SINUSES AND MASTOIDS:  Clear.  MISCELLANEOUS:  No orbital or suprasellar abnormality noted.    IMPRESSION:    1)  subacute left occipital hemorrhage with vasogenic edema in the left parietal-occipital region. A hemorrhagic metastasis is within the differential. Further workup and assessment recommended. Follow-up MR imaging with andwithout gadolinium may be considered for further evaluation.  2)  no significant midline shift shift or hydrocephalus.    No significant change when compared with prior CT.                IMKO CHARLES MD; Attending Radiologist  This document has been electronically signed. May 29 2021  9:33AM    < end of copied text >

## 2021-05-31 NOTE — PROGRESS NOTE ADULT - PROBLEM SELECTOR PLAN 1
Patient with 2.4 x 3.0 x 2.4 cm left occipital acute intraparenchymal hemorrhage with associated vasogenic edema and mass effect, no midline shift. CTA head/neck 5 hrs later with unchanged left occipital acute intraparenchymal hemorrhage. No active bleeding. Imaging and exam stable  - INR ~2 (likely iso liver dysfunction), s/p vitamin K, downtrending  - platelets goal >100 (downtrending but above goal)  - per neuro can d/c AED  - Neuro following, will determine the need for steroid after MRI brain and orbits  - MRI brain and orbits wwo C for stroke workup and rule out underlying lesion  - f/u VEEG

## 2021-05-31 NOTE — CONSULT NOTE ADULT - SUBJECTIVE AND OBJECTIVE BOX
Chief Complaint:  Patient is a 65y old  Female who presents with a chief complaint of Intraparenchymal Hemorrhage ICH (31 May 2021 09:25)      HPI:    Allergies:  No Known Allergies      Home Medications:    Hospital Medications:  folic acid 1 milliGRAM(s) Oral daily  multivitamin 1 Tablet(s) Oral daily  thiamine 100 milliGRAM(s) Oral daily      PMHX/PSHX:  No pertinent past medical history    No significant past surgical history        Family history:  No pertinent family history in first degree relatives        Denies family history of colon cancer/polyps, stomach cancer/polyps, pancreatic cancer/masses, liver cancer/disease, ovarian cancer and endometrial cancer.    Social History:     Tob: Denies  EtOH: As above  Illicit Drugs: Denies    ROS:   General:  No wt loss, fevers, chills, night sweats, fatigue  Eyes:  Good vision, no reported pain  ENT:  No sore throat, pain, runny nose, dysphagia  CV:  No pain, palpitations, hypo/hypertension  Pulm:  No dyspnea, cough, tachypnea, wheezing  GI:  As per HPI  :  No pain, bleeding, incontinence, nocturia  Muscle:  No pain, weakness  Neuro:  No weakness, tingling, memory problems  Psych:  No fatigue, insomnia, mood problems, depression  Endocrine:  No polyuria, polydipsia, cold/heat intolerance  Heme:  No petechiae, ecchymosis, easy bruisability  Skin:  No rash, tattoos, scars, edema    PHYSICAL EXAM:   GENERAL:  No acute distress  HEENT:  Normocephalic/atraumatic, no scleral icterus  CHEST:  No accessory muscle use  HEART:  Regular rate and rhythm  ABDOMEN:  Soft, non-tender, non-distended, normoactive bowel sounds,  no masses, no hepato-splenomegaly, no signs of chronic liver disease  EXTREMITIES: No cyanosis, clubbing, or edema  SKIN:  No rash  NEURO:  Alert and oriented x 3, no asterixis    Vital Signs:  Vital Signs Last 24 Hrs  T(C): 37 (31 May 2021 10:09), Max: 37.1 (30 May 2021 21:00)  T(F): 98.6 (31 May 2021 10:09), Max: 98.7 (30 May 2021 21:00)  HR: 77 (31 May 2021 10:09) (71 - 86)  BP: 142/65 (31 May 2021 10:09) (129/64 - 142/65)  BP(mean): 77 (31 May 2021 10:09) (77 - 85)  RR: 18 (31 May 2021 10:09) (17 - 18)  SpO2: 98% (31 May 2021 10:09) (95% - 100%)  Daily     Daily     LABS:                        11.9   5.34  )-----------( 121      ( 31 May 2021 06:33 )             35.8     Mean Cell Volume: 98.1 fL (05-31-21 @ 06:33)    05-31    140  |  106  |  5<L>  ----------------------------<  118<H>  4.5   |  22  |  0.61    Ca    8.8      31 May 2021 06:33  Phos  2.2     05-31  Mg     1.8     05-31    TPro  6.6  /  Alb  3.0<L>  /  TBili  3.6<H>  /  DBili  x   /  AST  56<H>  /  ALT  31  /  AlkPhos  104  05-31    LIVER FUNCTIONS - ( 31 May 2021 06:33 )  Alb: 3.0 g/dL / Pro: 6.6 g/dL / ALK PHOS: 104 U/L / ALT: 31 U/L / AST: 56 U/L / GGT: x           PT/INR - ( 31 May 2021 07:57 )   PT: 20.4 sec;   INR: 1.84 ratio         PTT - ( 31 May 2021 07:57 )  PTT:38.3 sec                            11.9   5.34  )-----------( 121      ( 31 May 2021 06:33 )             35.8                         11.4   5.20  )-----------( 129      ( 30 May 2021 07:13 )             34.6                         13.7   6.15  )-----------( 145      ( 29 May 2021 04:24 )             39.9                         13.6   6.37  )-----------( 151      ( 28 May 2021 17:53 )             40.2       Imaging:           Chief Complaint:  Patient is a 65y old  Female who presents with a chief complaint of Intraparenchymal Hemorrhage ICH (31 May 2021 09:25)      HPI:  Ms. Ramos is a 64yo F with no known PMH who presented to the ED with headache and vision changes, found to have an intraparenchymal hemorrhage on CTH and indeterminate infiltrative liver lesion centered in segment 6 measures approximately 6.0 x 4.5 cm concerning for metastatic neoplasm vs infection. LFTs concerning for synthetic dysfunction. Also found to have +HBsAg with negative HBsAb. Core IgM negative. Core IgG, E Ag/Ab, and delta agent pending AM collection. Pending IR biopsy of liver lesion although they're hesitant given coagulopathy.    Allergies:  No Known Allergies      Home Medications:    Hospital Medications:  folic acid 1 milliGRAM(s) Oral daily  multivitamin 1 Tablet(s) Oral daily  thiamine 100 milliGRAM(s) Oral daily      PMHX/PSHX:  No pertinent past medical history    No significant past surgical history        Family history:  No pertinent family history in first degree relatives        Denies family history of colon cancer/polyps, stomach cancer/polyps, pancreatic cancer/masses, liver cancer/disease, ovarian cancer and endometrial cancer.    Social History:     Tob: Denies  EtOH: As above  Illicit Drugs: Denies    ROS:   General:  No wt loss, fevers, chills, night sweats, fatigue  Eyes:  Good vision, no reported pain  ENT:  No sore throat, pain, runny nose, dysphagia  CV:  No pain, palpitations, hypo/hypertension  Pulm:  No dyspnea, cough, tachypnea, wheezing  GI:  As per HPI  :  No pain, bleeding, incontinence, nocturia  Muscle:  No pain, weakness  Neuro:  No weakness, tingling, memory problems  Psych:  No fatigue, insomnia, mood problems, depression  Endocrine:  No polyuria, polydipsia, cold/heat intolerance  Heme:  No petechiae, ecchymosis, easy bruisability  Skin:  No rash, tattoos, scars, edema    PHYSICAL EXAM:   GENERAL:  No acute distress  HEENT:  Normocephalic/atraumatic, no scleral icterus  CHEST:  No accessory muscle use  HEART:  Regular rate and rhythm  ABDOMEN:  Soft, non-tender, non-distended, normoactive bowel sounds,  no masses, no hepato-splenomegaly, no signs of chronic liver disease  EXTREMITIES: No cyanosis, clubbing, or edema  SKIN:  No rash  NEURO:  Alert and oriented x 3, no asterixis    Vital Signs:  Vital Signs Last 24 Hrs  T(C): 37 (31 May 2021 10:09), Max: 37.1 (30 May 2021 21:00)  T(F): 98.6 (31 May 2021 10:09), Max: 98.7 (30 May 2021 21:00)  HR: 77 (31 May 2021 10:09) (71 - 86)  BP: 142/65 (31 May 2021 10:09) (129/64 - 142/65)  BP(mean): 77 (31 May 2021 10:09) (77 - 85)  RR: 18 (31 May 2021 10:09) (17 - 18)  SpO2: 98% (31 May 2021 10:09) (95% - 100%)  Daily     Daily     LABS:                        11.9   5.34  )-----------( 121      ( 31 May 2021 06:33 )             35.8     Mean Cell Volume: 98.1 fL (05-31-21 @ 06:33)    05-31    140  |  106  |  5<L>  ----------------------------<  118<H>  4.5   |  22  |  0.61    Ca    8.8      31 May 2021 06:33  Phos  2.2     05-31  Mg     1.8     05-31    TPro  6.6  /  Alb  3.0<L>  /  TBili  3.6<H>  /  DBili  x   /  AST  56<H>  /  ALT  31  /  AlkPhos  104  05-31    LIVER FUNCTIONS - ( 31 May 2021 06:33 )  Alb: 3.0 g/dL / Pro: 6.6 g/dL / ALK PHOS: 104 U/L / ALT: 31 U/L / AST: 56 U/L / GGT: x           PT/INR - ( 31 May 2021 07:57 )   PT: 20.4 sec;   INR: 1.84 ratio         PTT - ( 31 May 2021 07:57 )  PTT:38.3 sec                            11.9   5.34  )-----------( 121      ( 31 May 2021 06:33 )             35.8                         11.4   5.20  )-----------( 129      ( 30 May 2021 07:13 )             34.6                         13.7   6.15  )-----------( 145      ( 29 May 2021 04:24 )             39.9                         13.6   6.37  )-----------( 151      ( 28 May 2021 17:53 )             40.2       Imaging:           Chief Complaint:  Patient is a 65y old  Female who presents with a chief complaint of Intraparenchymal Hemorrhage ICH (31 May 2021 09:25)      HPI:  Ms. Ramos is a 64yo F with no known PMH who presented to the ED with headache and vision changes. Hepatology consulted for liver lesion and HBV.    Patient presented 5/28 with new onset HA and vision blurriness. CTH showed a 2.4 x 3.0 x 2.4 cm left occipital acute intraparenchymal hemorrhage with associated vasogenic edema and mass effect, no midline shift. Stable on serial CTs.     Patient was also noted to have elevated INR. CTAP with Indeterminate infiltrative liver mass and multiple small pulmonary nodules; concerning for malignancy. She was found to have +HBsAg with negative HBsAb. Core IgM negative. Core IgG, E Ag/Ab, and delta agent pending. Patient with no known history of liver disease. She endorses yellowing of her eyes and worsening abdominal distention, fatigue and nausea for the past month prior to admission. No drug or EtOH use per patient, however daughter endorsed to team that patient is heavily drinking. Her  passed away 11y prior due to a liver disease attributed to EtOH. No blood transfusions given. Endorses 20lbs weight loss in the past 6m.      Allergies:  No Known Allergies      Home Medications:    Hospital Medications:  folic acid 1 milliGRAM(s) Oral daily  multivitamin 1 Tablet(s) Oral daily  thiamine 100 milliGRAM(s) Oral daily      PMHX/PSHX:  No pertinent past medical history    No significant past surgical history        Family history:  No pertinent family history in first degree relatives        Denies family history of colon cancer/polyps, stomach cancer/polyps, pancreatic cancer/masses, liver cancer/disease, ovarian cancer and endometrial cancer.    Social History:     Tob: Denies  EtOH: As above  Illicit Drugs: Denies    ROS:   General:  +wt loss, fevers, chills, night sweats,+ fatigue  Eyes:  blurry vision, no reported pain  ENT:  No sore throat, pain, runny nose, dysphagia  CV:  No pain, palpitations, hypo/hypertension  Pulm:  No dyspnea, cough, tachypnea, wheezing  GI:  As per HPI  :  No pain, bleeding, incontinence, nocturia  Muscle:  No pain, weakness  Neuro:  No weakness, tingling, memory problems  Psych:  +fatigue, insomnia, mood problems, depression  Endocrine:  No polyuria, polydipsia, cold/heat intolerance  Heme:  No petechiae, ecchymosis, easy bruisability  Skin:  No rash, tattoos, scars, edema    PHYSICAL EXAM:   GENERAL:  No acute distress  HEENT:  Normocephalic/atraumatic, +scleral icterus  CHEST:  No accessory muscle use  HEART:  Regular rate and rhythm  ABDOMEN:  Soft, non-tender, distended  EXTREMITIES: No cyanosis, clubbing, or edema  SKIN:  No rash  NEURO:  Alert and oriented x 3, no asterixis    Vital Signs:  Vital Signs Last 24 Hrs  T(C): 37 (31 May 2021 10:09), Max: 37.1 (30 May 2021 21:00)  T(F): 98.6 (31 May 2021 10:09), Max: 98.7 (30 May 2021 21:00)  HR: 77 (31 May 2021 10:09) (71 - 86)  BP: 142/65 (31 May 2021 10:09) (129/64 - 142/65)  BP(mean): 77 (31 May 2021 10:09) (77 - 85)  RR: 18 (31 May 2021 10:09) (17 - 18)  SpO2: 98% (31 May 2021 10:09) (95% - 100%)  Daily     Daily     LABS:                        11.9   5.34  )-----------( 121      ( 31 May 2021 06:33 )             35.8     Mean Cell Volume: 98.1 fL (05-31-21 @ 06:33)    05-31    140  |  106  |  5<L>  ----------------------------<  118<H>  4.5   |  22  |  0.61    Ca    8.8      31 May 2021 06:33  Phos  2.2     05-31  Mg     1.8     05-31    TPro  6.6  /  Alb  3.0<L>  /  TBili  3.6<H>  /  DBili  x   /  AST  56<H>  /  ALT  31  /  AlkPhos  104  05-31    LIVER FUNCTIONS - ( 31 May 2021 06:33 )  Alb: 3.0 g/dL / Pro: 6.6 g/dL / ALK PHOS: 104 U/L / ALT: 31 U/L / AST: 56 U/L / GGT: x           PT/INR - ( 31 May 2021 07:57 )   PT: 20.4 sec;   INR: 1.84 ratio         PTT - ( 31 May 2021 07:57 )  PTT:38.3 sec                            11.9   5.34  )-----------( 121      ( 31 May 2021 06:33 )             35.8                         11.4   5.20  )-----------( 129      ( 30 May 2021 07:13 )             34.6                         13.7   6.15  )-----------( 145      ( 29 May 2021 04:24 )             39.9                         13.6   6.37  )-----------( 151      ( 28 May 2021 17:53 )             40.2       Imaging:

## 2021-06-01 LAB
A1C WITH ESTIMATED AVERAGE GLUCOSE RESULT: 5 % — SIGNIFICANT CHANGE UP (ref 4–5.6)
AFP-TM SERPL-MCNC: 5.8 NG/ML — SIGNIFICANT CHANGE UP
ALBUMIN SERPL ELPH-MCNC: 2.9 G/DL — LOW (ref 3.3–5)
ALP SERPL-CCNC: 102 U/L — SIGNIFICANT CHANGE UP (ref 40–120)
ALT FLD-CCNC: 31 U/L — SIGNIFICANT CHANGE UP (ref 4–33)
ANION GAP SERPL CALC-SCNC: 11 MMOL/L — SIGNIFICANT CHANGE UP (ref 7–14)
APTT BLD: 33.2 SEC — SIGNIFICANT CHANGE UP (ref 27–36.3)
AST SERPL-CCNC: 57 U/L — HIGH (ref 4–32)
BILIRUB SERPL-MCNC: 3.3 MG/DL — HIGH (ref 0.2–1.2)
BUN SERPL-MCNC: 4 MG/DL — LOW (ref 7–23)
CALCIUM SERPL-MCNC: 8.7 MG/DL — SIGNIFICANT CHANGE UP (ref 8.4–10.5)
CHLORIDE SERPL-SCNC: 104 MMOL/L — SIGNIFICANT CHANGE UP (ref 98–107)
CHOLEST SERPL-MCNC: 141 MG/DL — SIGNIFICANT CHANGE UP
CO2 SERPL-SCNC: 21 MMOL/L — LOW (ref 22–31)
CREAT SERPL-MCNC: 0.62 MG/DL — SIGNIFICANT CHANGE UP (ref 0.5–1.3)
ESTIMATED AVERAGE GLUCOSE: 97 MG/DL — SIGNIFICANT CHANGE UP (ref 68–114)
GLUCOSE SERPL-MCNC: 115 MG/DL — HIGH (ref 70–99)
HBV CORE AB SER-ACNC: REACTIVE
HBV E AB SER-ACNC: REACTIVE
HBV E AG SER-ACNC: SIGNIFICANT CHANGE UP
HCT VFR BLD CALC: 35.4 % — SIGNIFICANT CHANGE UP (ref 34.5–45)
HDLC SERPL-MCNC: 29 MG/DL — LOW
HGB BLD-MCNC: 12 G/DL — SIGNIFICANT CHANGE UP (ref 11.5–15.5)
HIV 1+2 AB+HIV1 P24 AG SERPL QL IA: SIGNIFICANT CHANGE UP
INR BLD: 1.85 RATIO — HIGH (ref 0.88–1.16)
LIPID PNL WITH DIRECT LDL SERPL: 93 MG/DL — SIGNIFICANT CHANGE UP
MAGNESIUM SERPL-MCNC: 1.8 MG/DL — SIGNIFICANT CHANGE UP (ref 1.6–2.6)
MCHC RBC-ENTMCNC: 33.2 PG — SIGNIFICANT CHANGE UP (ref 27–34)
MCHC RBC-ENTMCNC: 33.9 GM/DL — SIGNIFICANT CHANGE UP (ref 32–36)
MCV RBC AUTO: 98.1 FL — SIGNIFICANT CHANGE UP (ref 80–100)
NON HDL CHOLESTEROL: 112 MG/DL — SIGNIFICANT CHANGE UP
NRBC # BLD: 0 /100 WBCS — SIGNIFICANT CHANGE UP
NRBC # FLD: 0 K/UL — SIGNIFICANT CHANGE UP
PHOSPHATE SERPL-MCNC: 3.8 MG/DL — SIGNIFICANT CHANGE UP (ref 2.5–4.5)
PLATELET # BLD AUTO: 120 K/UL — LOW (ref 150–400)
POTASSIUM SERPL-MCNC: 3.8 MMOL/L — SIGNIFICANT CHANGE UP (ref 3.5–5.3)
POTASSIUM SERPL-SCNC: 3.8 MMOL/L — SIGNIFICANT CHANGE UP (ref 3.5–5.3)
PROT SERPL-MCNC: 6.6 G/DL — SIGNIFICANT CHANGE UP (ref 6–8.3)
PROTHROM AB SERPL-ACNC: 20.7 SEC — HIGH (ref 10.6–13.6)
RBC # BLD: 3.61 M/UL — LOW (ref 3.8–5.2)
RBC # FLD: 14.9 % — HIGH (ref 10.3–14.5)
SODIUM SERPL-SCNC: 136 MMOL/L — SIGNIFICANT CHANGE UP (ref 135–145)
TRIGL SERPL-MCNC: 95 MG/DL — SIGNIFICANT CHANGE UP
WBC # BLD: 5.87 K/UL — SIGNIFICANT CHANGE UP (ref 3.8–10.5)
WBC # FLD AUTO: 5.87 K/UL — SIGNIFICANT CHANGE UP (ref 3.8–10.5)

## 2021-06-01 PROCEDURE — 99233 SBSQ HOSP IP/OBS HIGH 50: CPT | Mod: GC

## 2021-06-01 PROCEDURE — 99232 SBSQ HOSP IP/OBS MODERATE 35: CPT | Mod: GC

## 2021-06-01 RX ADMIN — Medication 100 MILLIGRAM(S): at 13:17

## 2021-06-01 RX ADMIN — Medication 1 MILLIGRAM(S): at 13:18

## 2021-06-01 RX ADMIN — Medication 1 TABLET(S): at 13:18

## 2021-06-01 NOTE — PROGRESS NOTE ADULT - ASSESSMENT
66yo F with no known PMH who presented to the ED with headache and vision changes. Hepatology consulted for liver lesion and HBV.    # Infiltrative liver lesion - concerning for malignancy, likely HCC in the setting of chronic HBV. Would hold off from biopsy until MRI abdomen results.   # Elevated liver tests - mostly bilirubin elevation, potentially in the settings of an infiltrative disease vs. worsening hepatic dysfunction in the settings of chronic HBV/EtOH use (c/w elevated INR and bilirubin). No cirrhosis on imaging or evidence of portal hypertension.  # HBV - +HBVag, negative core IgM concerning for chronic HBV. Awaiting Core IgG, E Ag/Ab, and delta agent. Possibly contracted from  (who had a liver disease). No clear exposure.   # ICH - possibly in the settings of coagulopathy, stable    Recommendations:  - MRI liver protocol for HCC evaluation  - AFP  - Core IgG, E Ag/Ab, and delta agent pending  - HBV DNA  - Daily CMP, INR, CBC  - Thiamine, folate, MVI    Thank you for involving us in the care of this patient. Please reach out if any further questions.    Jabari Lara, PGY-4  Gastroenterology Fellow    Available on [employer] Teams  Pager [phone] ([facility]) or [phone] ([facility])  After 5PM/Weekends, please contact the on-call GI fellow: [phone]  Available through [employer] Teams

## 2021-06-01 NOTE — PROGRESS NOTE ADULT - PROBLEM SELECTOR PLAN 1
Patient with 2.4 x 3.0 x 2.4 cm left occipital acute intraparenchymal hemorrhage with associated vasogenic edema and mass effect, no midline shift. CTA head/neck 5 hrs later with unchanged left occipital acute intraparenchymal hemorrhage. No active bleeding. Imaging and exam stable  - INR ~2 (likely iso liver dysfunction), s/p vitamin K, downtrending  - platelets goal >100 (downtrending but above goal)  - per neuro can d/c AED  - Neuro following, will determine the need for steroid after MRI brain and orbits  - MRI brain and orbits wwo C for stroke workup and rule out underlying lesion

## 2021-06-01 NOTE — PROGRESS NOTE ADULT - ATTENDING COMMENTS
Patient seen and examined with the liver team I agree with the plan as above.  Hepatitis B serologies pending and HBVDNA. Will obtain MRI to better evaluate liver lesion  I spoke at length with the patient and her daughter regarding hepatitis B. I recommended that all household and close contacts be checked for HBV

## 2021-06-01 NOTE — PROGRESS NOTE ADULT - ASSESSMENT
65F with no known PMH who presented to the ED with headache and vision changes, found to have an intraparenchymal hemorrhage on CTH and indeterminate infiltrative liver mass concerning for metastatic neoplasm, s/p MICU stay for q1 hour neuro checks. Labs concerning for hepatitis B. Pending follow up labs/imaging for chronicity and possibility of metastatic HCC.

## 2021-06-01 NOTE — PROGRESS NOTE ADULT - SUBJECTIVE AND OBJECTIVE BOX
Neurology Progress Note    S: Patient seen and examined. No new events overnight. patient denied CP, SOB, HA or pain. no change. pending MRI    Medication:  MEDICATIONS  (STANDING):  folic acid 1 milliGRAM(s) Oral daily  multivitamin 1 Tablet(s) Oral daily  thiamine 100 milliGRAM(s) Oral daily    MEDICATIONS  (PRN):        Vitals:  Vital Signs Last 24 Hrs  T(C): 36.8 (01 Jun 2021 06:00), Max: 37.1 (31 May 2021 22:00)  T(F): 98.2 (01 Jun 2021 06:00), Max: 98.7 (31 May 2021 22:00)  HR: 73 (01 Jun 2021 06:00) (73 - 81)  BP: 132/65 (01 Jun 2021 06:00) (132/65 - 157/67)  BP(mean): 73 (01 Jun 2021 06:00) (73 - 91)  RR: 18 (01 Jun 2021 06:00) (17 - 18)  SpO2: 97% (01 Jun 2021 06:00) (96% - 99%)    General Exam:   General Appearance: Appropriately dressed and in no acute distress       Head: Normocephalic, atraumatic and no dysmorphic features  Ear, Nose, and Throat: Moist mucous membranes  CVS: S1S2+  Resp: No SOB, no wheeze or rhonchi  Abd: soft NTND  Extremities: No edema, no cyanosis  Skin: No bruises, no rashes     Neurological Exam:  Mental Status: Awake, alert and oriented x 3.  Able to follow simple and complex verbal commands. Able to name and repeat. fluent speech. No obvious aphasia or dysarthria noted.   Cranial Nerves: PERRL, EOMI, RHHA , sensation V1-V3 intact, R facial asymmetry , equal elevation of palate, scm/trap 5/5, tongue is midline on protrusion. no obvious papilledema on fundoscopic exam. Hearing is grossly intact.   Motor: Normal bulk, tone and strength throughout. Fine finger movements were intact and symmetric. no tremors or drift noted.    Sensation: Intact to light touch and pinprick throughout. no right/left confusion. no extinction to tactile on DSS.  Reflexes: 1+ throughout at biceps, brachioradialis, triceps, patellars and ankles bilaterally and equal. No clonus. R toe and L toe were both downgoing.  Coordination: No dysmetria on FNF   Gait: deferred     I personally reviewed the below data/images/labs:      CBC Full  -  ( 01 Jun 2021 07:24 )  WBC Count : 5.87 K/uL  RBC Count : 3.61 M/uL  Hemoglobin : 12.0 g/dL  Hematocrit : 35.4 %  Platelet Count - Automated : 120 K/uL  Mean Cell Volume : 98.1 fL  Mean Cell Hemoglobin : 33.2 pg  Mean Cell Hemoglobin Concentration : 33.9 gm/dL  Auto Neutrophil # : x  Auto Lymphocyte # : x  Auto Monocyte # : x  Auto Eosinophil # : x  Auto Basophil # : x  Auto Neutrophil % : x  Auto Lymphocyte % : x  Auto Monocyte % : x  Auto Eosinophil % : x  Auto Basophil % : x    06-01    136  |  104  |  4<L>  ----------------------------<  115<H>  3.8   |  21<L>  |  0.62    Ca    8.7      01 Jun 2021 07:24  Phos  3.8     06-01  Mg     1.8     06-01    TPro  6.6  /  Alb  2.9<L>  /  TBili  3.3<H>  /  DBili  x   /  AST  57<H>  /  ALT  31  /  AlkPhos  102  06-01    CT head w/o contrast (20:00): IMPRESSION:   2.4 x 3.0 x 2.4 cm left occipital acute intraparenchymal hemorrhage with associated vasogenic edema and mass effect as described. There is no midline shift.    Repeat CT head w/o contrast (2357):   CT brain: Unchanged left occipital acute intraparenchymal hemorrhage. No active bleeding.    CT angiography neck: No hemodynamically significant stenosis by NASCET criteria. No vascular dissection.    CT angiography brain: No major vessel occlusion or proximal stenosis. No aneurysm or vascular malformation.  < from: CT Head No Cont (05.29.21 @ 09:23) >    EXAM:  CT BRAIN        PROCEDURE DATE:  May 29 2021         INTERPRETATION:  INDICATION:  Left occipital hemorrhage. Follow-up.  TECHNIQUE:  A non contrast 2.5 or 3 mm axial CT study of the brain was performed from skull base to vertex. Coronal andsagittal reformations were generated from the axial data.  COMPARISON EXAMINATION:  CT dated 5/28/2021.    FINDINGS:    HEMISPHERES:  A subacute hematoma is again identified in the left occipital region. This is unchanged in size without evidence of further hemorrhage. There is underlying white matter edema in the left the parietal and occipital region with an appearance suggesting vasogenic edema. A hemorrhagic met, therefore, cannot be ruled out. Follow-up MR imaging with and without gadoliniumrecommended for further assessment along with further clinical correlation. There are no other areas of hemorrhage.  VENTRICLES:  Midline and normal in size.  POSTERIOR FOSSA:  The brain stem and cerebellum are unremarkable.  No CP angle lesion noted.  EXTRACEREBRAL SPACES:  No subdural or epidural collections are noted.  SKULL BASE AND CALVARIUM:  Appears intact.  No fracture or destructive lesion is identified.  SINUSES AND MASTOIDS:  Clear.  MISCELLANEOUS:  No orbital or suprasellar abnormality noted.    IMPRESSION:    1)  subacute left occipital hemorrhage with vasogenic edema in the left parietal-occipital region. A hemorrhagic metastasis is within the differential. Further workup and assessment recommended. Follow-up MR imaging with andwithout gadolinium may be considered for further evaluation.  2)  no significant midline shift shift or hydrocephalus.    No significant change when compared with prior CT.          MIKO CHARLES MD; Attending Radiologist  This document has been electronically signed. May 29 2021  9:33AM    < end of copied text >  < from: Transthoracic Echocardiogram (05.31.21 @ 09:01) >    Patient name: MENDOZA, GRISELDA  YOB: 1955   Age: 65 (F)   MR#: [id number]  Study Date: 5/31/2021  Location: 616A Bubble StudySonographer: Jessica Elliott RDCS  Study quality: Technically good  Referring Physician: Cristino Han MD  Blood Pressure: 134/86 mmHg  Height: 154 cm  Weight: 59 kg  BSA: 1.6 m2  ------------------------------------------------------------------------  PROCEDURE: Transthoracic echocardiogram with 2-D, M-Mode  and complete spectral and color flow Doppler. Patient was  injected with 10 cc's of aerosolized saline.  Patient was injected with 10 cc's of aerosolized saline.  INDICATION: Cerebral infarction, unspecified (I63.9)  ------------------------------------------------------------------------  DIMENSIONS:  Dimensions:     Normal Values:  LA:     3.4 cm    2.0 - 4.0 cm  Ao:     2.4 cm    2.0 - 3.8 cm  SEPTUM: 0.7 cm    0.6 - 1.2 cm  PWT:    0.8 cm    0.6 - 1.1 cm  LVIDd:  4.3 cm    3.0 - 5.6 cm  LVIDs:  2.6 cm    1.8 - 4.0 cm  Derived Variables:  LVMI: 62 g/m2  RWT: 0.37  Fractional short: 40 %  Ejection Fraction (Teicholtz): 70 %  ------------------------------------------------------------------------  OBSERVATIONS:  Mitral Valve: Normal mitral valve. Mild-moderate mitral  regurgitation.  Aortic Root: Normal aortic root.  Aortic Valve: Normal trileaflet aortic valve.  Left Atrium: Normal left atrium.  LA volume index = 18  cc/m2.  Left Ventricle: Normal left ventricular systolic function.  No segmental wall motion abnormalities. Normal left  ventricular internal dimensions and wall thicknesses.  Normal left ventricular diastolic function.  Right Heart: Normal right atrium. Normal right ventricular  size and function. Normal tricuspid valve. Mild tricuspid  regurgitation. Normal pulmonic valve.  Pericardium/PleuraNormal pericardium with no pericardial  effusion.  Hemodynamic: Estimated right ventricular systolic pressure  equals 40 mm Hg, assuming right atrial pressure equals 10  mm Hg, consistent with mild pulmonary hypertension.  Agitated saline injection demonstrates no evidence of a  patent foramen ovale.  ------------------------------------------------------------------------  CONCLUSIONS:  1. Normal mitral valve. Mild-moderate mitral regurgitation.  2. Normal trileafletaortic valve.  3. Normal left ventricular internal dimensions and wall  thicknesses.  4. Normal left ventricular systolic function. No segmental  wall motion abnormalities.  5. Normal left ventricular diastolic function.  6. Normal right ventricular size and function.  7. Normal tricuspid valve. Mild tricuspid regurgitation.  8. Estimated pulmonary artery systolic pressure equals 40  mm Hg, assuming right atrial pressure equals 10  mm Hg,  consistent with mild pulmonary hypertension.  9. Agitatedsaline injection demonstrates no evidence of a  patent foramen ovale.  *** No previous Echo exam.  ------------------------------------------------------------------------  Confirmed on  5/31/2021 - 10:02:23 by Dalia Kimball MD  ------------------------------------------------------------------------    < end of copied text >  A1C with Estimated Average Glucose in AM (06.01.21 @ 07:28)   A1C with Estimated Average Glucose Result: 5.0:   LDL Cholesterol Calculated: 93 mg/dL (06.01.21 @ 07:24)

## 2021-06-01 NOTE — SWALLOW BEDSIDE ASSESSMENT ADULT - COMMENTS
Medicine Note 6/1/2021 - 65F with no known PMH who presented to the ED with headache and vision changes, found to have an intraparenchymal hemorrhage on CTH and indeterminate infiltrative liver mass concerning for metastatic neoplasm, s/p MICU stay for q1 hour neuro checks. Labs concerning for hepatitis B. Pending follow up labs/imaging for chronicity and possibility of metastatic HCC.    Neurology Note 6/1/2021 - 66 yo RH AAF p/w HA and vision changes. Found to have L occipital IPH. on exam with R HHA and mild R facial; Repeat CTH stable 5/29. Also found to have liver mass. INR was ~2 on arrival s/p vit K. now on keppra. CTA H/N neg, TTE as above, A1c 5%, LDL 93mg/d. Etiology of IPH may be 2/2 HTN, mets, CAA, coagulaopathy from liver disease, needs further workup.    Patient seen at bedside. Alert and oriented. Patient is able to follow commands and express simple needs. Patient's daughter is also present.

## 2021-06-01 NOTE — PROGRESS NOTE ADULT - SUBJECTIVE AND OBJECTIVE BOX
*******************************************************  Pedro Navarro MD PGY-1  Internal Medicine  Pager ([facility]) [phone] / [phone]  *******************************************************  Patient is a 65y old  Female who presents with a chief complaint of Intraparenchymal Hemorrhage ICH (01 Jun 2021 09:12)      SUBJECTIVE / OVERNIGHT EVENTS:  - No acute events overnight.   - Patient seen and evaluated at bedside.  - No complaints  - ROS: Denies fevers/chills, headache, SOB at rest, cough, chest pain, palpitations, abdominal pain, nausea/vomiting/diarrhea/constipation, melena/hematochezia, dysuria, and hematuria    ------------------------------------------------------------------------------------------------------------    MEDICATIONS  (STANDING):  folic acid 1 milliGRAM(s) Oral daily  multivitamin 1 Tablet(s) Oral daily  thiamine 100 milliGRAM(s) Oral daily    MEDICATIONS  (PRN):      ------------------------------------------------------------------------------------------------------------    OBJECTIVE:    CAPILLARY BLOOD GLUCOSE        I&O's Summary    Daily     Daily   Weight (kg): 54 (05-29-21 @ 02:43)    PHYSICAL EXAM:  T(F): 98.2, Max: 98.7 (05-31-21 @ 22:00)  HR: 73 (73 - 81)  BP: 132/65 (132/65 - 157/67)  RR: 18 (17 - 18)  SpO2: 97% (96% - 99%)    CONSTITUTIONAL: NAD, well-developed  HEENT: NCAT, EOMI, PERRLA, no scleral icterus, MMM  RESPIRATORY/CHEST: Normal respiratory effort; lungs are clear to auscultation bilaterally; no wheezing/crackles  CARDIO: Regular rate, normal S1 and S2, +systolic 2/6 murmur. No rub/gallop; No JVD  VASCULAR: No lower extremity edema; Peripheral pulses are 2+ bilaterally; Capillary refill brisk  ABDOMEN: Soft, nontender to palpation, normoactive bowel sounds, no rebound/guarding; No hepatosplenomegaly  MUSCULOSKELETAL: no joint swelling or tenderness to palpation, full strength all extremities.  EXTREMITIES: hands/feet are warm, and without cyanosis or clubbing  SKIN: no visible rashes, pallor, diaphoresis, or jaundice  NEURO: No focal deficits; moving all extremities  PSYCH: A+O to person, place, and time; affect appropriate; cooperative    ------------------------------------------------------------------------------------------------------------  LABS:                        12.0   5.87  )-----------( 120      ( 01 Jun 2021 07:24 )             35.4     06-01    136  |  104  |  4<L>  ----------------------------<  115<H>  3.8   |  21<L>  |  0.62    Ca    8.7      01 Jun 2021 07:24  Phos  3.8     06-01  Mg     1.8     06-01    TPro  6.6  /  Alb  2.9<L>  /  TBili  3.3<H>  /  DBili  x   /  AST  57<H>  /  ALT  31  /  AlkPhos  102  06-01    PT/INR - ( 01 Jun 2021 07:24 )   PT: 20.7 sec;   INR: 1.85 ratio         PTT - ( 01 Jun 2021 07:24 )  PTT:33.2 sec            RADIOLOGY & ADDITIONAL TESTS:  Results Reviewed:     Imaging Personally Reviewed:  Electrocardiogram Personally Reviewed:      COORDINATION OF CARE:  Care discussed with consultants/other providers and notes reviewed [Y]:   ------------------------------------------------------------------------------------------------------------

## 2021-06-01 NOTE — SPEECH LANGUAGE PATHOLOGY EVALUATION - SLP DIAGNOSIS
Patient presents with Mild Receptive and Mild Expressive Language deficits characterized by reduced auditory comprehension for increase complexity of yes/no questions, ability to follow one and two step commands, adequate word comprehension task; mild hesitation noted with automatic sequences; adequate repetition skills and adequate responsive/confrontational naming task. Speech is fluent output. Patient presents with Mild Receptive and Mild Expressive Language deficits characterized by reduced auditory comprehension for increase complexity/length of yes/no questions, ability to follow one and two step commands, adequate word comprehension task; mild hesitation noted with automatic sequences; adequate repetition skills and adequate responsive/confrontational naming task. Speech is fluent output. Patient presents with Mild Receptive and Mild Expressive Language deficits characterized by reduced auditory comprehension for increase complexity/length of yes/no questions, adequate for simple yes/no questions; ability to follow one and two step commands, adequate word comprehension task. Expressive component noted with mild hesitation with automatic sequence tasks; adequate repetition skills and adequate confrontational naming task with mild difficulty with responsive naming task. Speech output is fluent with no motor speech deficits.

## 2021-06-01 NOTE — SPEECH LANGUAGE PATHOLOGY EVALUATION - COMMENTS
Medicine Note 6/1/2021 - 65F with no known PMH who presented to the ED with headache and vision changes, found to have an intraparenchymal hemorrhage on CTH and indeterminate infiltrative liver mass concerning for metastatic neoplasm, s/p MICU stay for q1 hour neuro checks. Labs concerning for hepatitis B. Pending follow up labs/imaging for chronicity and possibility of metastatic HCC.  Neurology Note 6/1/2021 - 64 yo RH AAF p/w HA and vision changes. Found to have L occipital IPH. on exam with R HHA and mild R facial; Repeat CTH stable 5/29. Also found to have liver mass. INR was ~2 on arrival s/p vit K. now on keppra. CTA H/N neg, TTE as above, A1c 5%, LDL 93mg/d. Etiology of IPH may be 2/2 HTN, mets, CAA, coagulaopathy from liver disease, needs further workup. Patient is adequate for one and two step directives (e.g. touch your nose; point to the ceiling then to the floor); adequate simple yes/no questions (e.g. do people write with a pen); mild difficulty with complex yes/no questions (e.g. Do two pounds of flour weight more than one pound of flour?); adequate word comprehension for picture ID task (5/5). Patient with mild hesitation noted with automatic sequences with 100% accuracy (e.g. days of the week; months of the year); adequate repetition skills for words/sentence; adequate responsive naming task (e.g. What do you use to tell time with?) and Confrontational naming task of object identification 5/5. Medicine Note 6/1/2021 - 65F with no known PMH who presented to the ED with headache and vision changes, found to have an intraparenchymal hemorrhage on CTH and indeterminate infiltrative liver mass concerning for metastatic neoplasm, s/p MICU stay for q1 hour neuro checks. Labs concerning for hepatitis B. Pending follow up labs/imaging for chronicity and possibility of metastatic HCC.    Neurology Note 6/1/2021 - 66 yo RH AAF p/w HA and vision changes. Found to have L occipital IPH. on exam with R HHA and mild R facial; Repeat CTH stable 5/29. Also found to have liver mass. INR was ~2 on arrival s/p vit K. now on keppra. CTA H/N neg, TTE as above, A1c 5%, LDL 93mg/d. Etiology of IPH may be 2/2 HTN, mets, CAA, coagulaopathy from liver disease, needs further workup. Patient will benefit comprehensive speech/language evaluation/therapy if patient's does not return back to baseline status prior to CVA pending discharge plans (e.g. Rehab Center vs Home Care vs OutPatient at the [facility] [phone]) as per discussion patient/daughter in agreement. Patient will benefit comprehensive speech/language evaluation/therapy if patient's offers c/o mild persistent deficits and given patient's daughter reports "not back like herself prior to the stroke" pending discharge plans (e.g. Rehab Center vs Home Care vs OutPatient at the [facility] [phone]) as per discussion patient/daughter in agreement.

## 2021-06-01 NOTE — SWALLOW BEDSIDE ASSESSMENT ADULT - SWALLOW EVAL: DIAGNOSIS
Patient presents with functional oropharyngeal stage swallowing mechanism characterized by adequate oral containment, adequate chewing for solid, adequate bolus manipulation and transfer for puree/solids. There is laryngeal elevation upon palpation, initiation of the pharyngeal swallow. There were no overt signs of impaired airway protection.

## 2021-06-01 NOTE — PROGRESS NOTE ADULT - PROBLEM SELECTOR PLAN 2
CTAP with Indeterminate infiltrative liver mass; concerning for malignancy  - h/o alcohol use, no signs of withdrawal currently, can d/c CIWA  - CT revealed pulmonary nodules, liver mass, and possible hemorrhagic brain mets; Will need malignancy workup  - MR liver protocol 6/2 under anesthesia  - IR to assess for liver biopsy based on MRI  - acute hepatitis panel concerning for HBV    Malignancy Screening:  Hgb 13.6 on admission  Cancer hx: no personal hx of cancers; 2 sisters passed with gastric cancer, daughter has breast cancer  Cancer screening:  Mammo and US wnl last year, but has had multiple workup/biopsies in the past for abnormal findings on mammo (all benign findings);   Last colonoscopy 3 years ago, normal finding per report  Never had EGD  PAP smear last year, reportedly normal

## 2021-06-01 NOTE — PROGRESS NOTE ADULT - SUBJECTIVE AND OBJECTIVE BOX
Interval Events:   - No acute events  - MRI abdomen pending    Allergies:  No Known Allergies        Hospital Medications:  folic acid 1 milliGRAM(s) Oral daily  multivitamin 1 Tablet(s) Oral daily  thiamine 100 milliGRAM(s) Oral daily      PMHX/PSHX:  No pertinent past medical history    No significant past surgical history        Family history:  No pertinent family history in first degree relatives        ROS:   General:  +wt loss, fevers, chills, night sweats,+ fatigue  Eyes:  blurry vision, no reported pain  ENT:  No sore throat, pain, runny nose, dysphagia  CV:  No pain, palpitations, hypo/hypertension  Pulm:  No dyspnea, cough, tachypnea, wheezing  GI:  As per HPI  :  No pain, bleeding, incontinence, nocturia  Muscle:  No pain, weakness  Neuro:  No weakness, tingling, memory problems  Psych:  +fatigue, insomnia, mood problems, depression  Endocrine:  No polyuria, polydipsia, cold/heat intolerance  Heme:  No petechiae, ecchymosis, easy bruisability  Skin:  No rash, tattoos, scars, edema    PHYSICAL EXAM:   Vital Signs:  Vital Signs Last 24 Hrs  T(C): 36.8 (01 Jun 2021 06:00), Max: 37.1 (31 May 2021 22:00)  T(F): 98.2 (01 Jun 2021 06:00), Max: 98.7 (31 May 2021 22:00)  HR: 73 (01 Jun 2021 06:00) (73 - 81)  BP: 132/65 (01 Jun 2021 06:00) (132/65 - 157/67)  BP(mean): 73 (01 Jun 2021 06:00) (73 - 91)  RR: 18 (01 Jun 2021 06:00) (17 - 18)  SpO2: 97% (01 Jun 2021 06:00) (96% - 99%)  Daily     Daily     GENERAL:  No acute distress  HEENT:  Normocephalic/atraumatic, +scleral icterus  CHEST:  No accessory muscle use  HEART:  Regular rate and rhythm  ABDOMEN:  Soft, non-tender, distended  EXTREMITIES: No cyanosis, clubbing, or edema  SKIN:  No rash  NEURO:  Alert and oriented x 3, no asterixis    LABS:                        12.0   5.87  )-----------( 120      ( 01 Jun 2021 07:24 )             35.4     Mean Cell Volume: 98.1 fL (06-01-21 @ 07:24)    05-31    140  |  106  |  5<L>  ----------------------------<  118<H>  4.5   |  22  |  0.61    Ca    8.8      31 May 2021 06:33  Phos  2.2     05-31  Mg     1.8     05-31    TPro  6.6  /  Alb  3.0<L>  /  TBili  3.6<H>  /  DBili  x   /  AST  56<H>  /  ALT  31  /  AlkPhos  104  05-31    LIVER FUNCTIONS - ( 31 May 2021 06:33 )  Alb: 3.0 g/dL / Pro: 6.6 g/dL / ALK PHOS: 104 U/L / ALT: 31 U/L / AST: 56 U/L / GGT: x           PT/INR - ( 01 Jun 2021 07:24 )   PT: 20.7 sec;   INR: 1.85 ratio         PTT - ( 01 Jun 2021 07:24 )  PTT:33.2 sec                            12.0   5.87  )-----------( 120      ( 01 Jun 2021 07:24 )             35.4                         11.9   5.34  )-----------( 121      ( 31 May 2021 06:33 )             35.8                         11.4   5.20  )-----------( 129      ( 30 May 2021 07:13 )             34.6       Imaging:

## 2021-06-01 NOTE — SWALLOW BEDSIDE ASSESSMENT ADULT - ADDITIONAL RECOMMENDATIONS
1.) Monitor for PO tolerance/intake.   2.) Monitor for any evolving neurological changes that may impact on oral intake.

## 2021-06-01 NOTE — PROGRESS NOTE ADULT - ATTENDING COMMENTS
65F with no known PMH who presented to the ED with headache and vision changes, found to have an intraparenchymal hemorrhage on CTH and indeterminate infiltrative liver mass concerning for neoplastic process.    # Liver Mass - HBV antigen positive raising concern for HCC, HBV viral load pending, remainder of hep panel sent. Will need MRI abdomen to further assess, planning for tomorrow with anesthesia given severe claustrophobia   # ICH - left occipital hemorrhage with vasogenic edema in the left parietal-occipital region stable on repeat CT. Hemorrhagic lesion from metastasis remains high on differential. Will need MRI brain, anesthesia said will need to be done separately from MRI liver.   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 s/p Vit K, continue to monitor INR  # Pulmonary Nodules - in setting of above concerning for metastatic lesions    Discussed with HS5

## 2021-06-01 NOTE — PROGRESS NOTE ADULT - PROBLEM SELECTOR PLAN 3
hepatitis panel with + HBsAg -HBsAb -HBcAb IgM  -f/u e-antigen/antibody, core IgG, delta agent, and viral load  -appreciate hepatology recs  -HIV negative  -AFP negative

## 2021-06-01 NOTE — PROGRESS NOTE ADULT - PROBLEM SELECTOR PLAN 4
s/p CIWA: patient denied heavy ETOH use, but daughter reports patient is drinking heavily  LEON [email or web address]  c/w thiamine, folic acid

## 2021-06-01 NOTE — PROGRESS NOTE ADULT - ASSESSMENT
64 yo RH AAF p/w HA and vision changes.  found to have L occipital IPH. on exam with R HHA and mild R facial  repeat CTH stable 5/29. also found to have liver mass. INR was ~2 on arrival s/p vit K. now on keppra. CTA H/N neg, TTE as above, A1c 5%, LDL 93mg/dL.   Etiology of IPH may be 2/2 HTN, mets, CAA, coagulaopathy from liver disease, needs further workup.   - MRI brain w/ and w/o  - MRI orbits   - no role for steroids from neurostandpoint  unless mass found intracranially   - SBP<160  - on keppra 500mg BID but no seizures noted. no need for AED unless has seizure  - rEEG  - cancer workup given pulmonary nodules and liver mass.    - PT/OT  - hepatology recs appreciated, HCC workup in progress, chronic hep B?  - tele   - check FS, glucose control <180  - GI/DVT ppx  - Counseling on diet, exercise, and medication adherence was done  - Counseling on smoking cessation and alcohol consumption offered when appropriate.  - Pain assessed and judicious use of narcotics when appropriate was discussed.    - Stroke education given when appropriate.  - Importance of fall prevention discussed.   - Differential diagnosis and plan of care discussed with patient and/or family and primary team  - Thank you for allowing me to participate in the care of this patient. Call with questions.   Suleman Valadez MD  Vascular Neurology .

## 2021-06-02 LAB
ALBUMIN SERPL ELPH-MCNC: 2.8 G/DL — LOW (ref 3.3–5)
ALP SERPL-CCNC: 112 U/L — SIGNIFICANT CHANGE UP (ref 40–120)
ALT FLD-CCNC: 31 U/L — SIGNIFICANT CHANGE UP (ref 4–33)
ANION GAP SERPL CALC-SCNC: 11 MMOL/L — SIGNIFICANT CHANGE UP (ref 7–14)
APTT BLD: 40.4 SEC — HIGH (ref 27–36.3)
AST SERPL-CCNC: 56 U/L — HIGH (ref 4–32)
BILIRUB SERPL-MCNC: 2.8 MG/DL — HIGH (ref 0.2–1.2)
BLD GP AB SCN SERPL QL: NEGATIVE — SIGNIFICANT CHANGE UP
BUN SERPL-MCNC: 6 MG/DL — LOW (ref 7–23)
CALCIUM SERPL-MCNC: 8.7 MG/DL — SIGNIFICANT CHANGE UP (ref 8.4–10.5)
CHLORIDE SERPL-SCNC: 106 MMOL/L — SIGNIFICANT CHANGE UP (ref 98–107)
CO2 SERPL-SCNC: 21 MMOL/L — LOW (ref 22–31)
CREAT SERPL-MCNC: 0.59 MG/DL — SIGNIFICANT CHANGE UP (ref 0.5–1.3)
GLUCOSE SERPL-MCNC: 118 MG/DL — HIGH (ref 70–99)
HBV DNA # SERPL NAA+PROBE: 31 IU/ML — SIGNIFICANT CHANGE UP
HBV DNA SERPL NAA+PROBE-LOG#: 1.49 LOG10IU/ML — SIGNIFICANT CHANGE UP
HCT VFR BLD CALC: 36 % — SIGNIFICANT CHANGE UP (ref 34.5–45)
HGB BLD-MCNC: 12 G/DL — SIGNIFICANT CHANGE UP (ref 11.5–15.5)
INR BLD: 1.92 RATIO — HIGH (ref 0.88–1.16)
MAGNESIUM SERPL-MCNC: 1.7 MG/DL — SIGNIFICANT CHANGE UP (ref 1.6–2.6)
MCHC RBC-ENTMCNC: 32.7 PG — SIGNIFICANT CHANGE UP (ref 27–34)
MCHC RBC-ENTMCNC: 33.3 GM/DL — SIGNIFICANT CHANGE UP (ref 32–36)
MCV RBC AUTO: 98.1 FL — SIGNIFICANT CHANGE UP (ref 80–100)
NRBC # BLD: 0 /100 WBCS — SIGNIFICANT CHANGE UP
NRBC # FLD: 0 K/UL — SIGNIFICANT CHANGE UP
PHOSPHATE SERPL-MCNC: 3.7 MG/DL — SIGNIFICANT CHANGE UP (ref 2.5–4.5)
PLATELET # BLD AUTO: 120 K/UL — LOW (ref 150–400)
POTASSIUM SERPL-MCNC: 3.8 MMOL/L — SIGNIFICANT CHANGE UP (ref 3.5–5.3)
POTASSIUM SERPL-SCNC: 3.8 MMOL/L — SIGNIFICANT CHANGE UP (ref 3.5–5.3)
PROT SERPL-MCNC: 6.6 G/DL — SIGNIFICANT CHANGE UP (ref 6–8.3)
PROTHROM AB SERPL-ACNC: 21.4 SEC — HIGH (ref 10.6–13.6)
RBC # BLD: 3.67 M/UL — LOW (ref 3.8–5.2)
RBC # FLD: 14.8 % — HIGH (ref 10.3–14.5)
RH IG SCN BLD-IMP: POSITIVE — SIGNIFICANT CHANGE UP
SODIUM SERPL-SCNC: 138 MMOL/L — SIGNIFICANT CHANGE UP (ref 135–145)
WBC # BLD: 6.3 K/UL — SIGNIFICANT CHANGE UP (ref 3.8–10.5)
WBC # FLD AUTO: 6.3 K/UL — SIGNIFICANT CHANGE UP (ref 3.8–10.5)

## 2021-06-02 PROCEDURE — 99233 SBSQ HOSP IP/OBS HIGH 50: CPT | Mod: GC

## 2021-06-02 PROCEDURE — 99232 SBSQ HOSP IP/OBS MODERATE 35: CPT | Mod: GC

## 2021-06-02 RX ORDER — PHYTONADIONE (VIT K1) 5 MG
10 TABLET ORAL ONCE
Refills: 0 | Status: COMPLETED | OUTPATIENT
Start: 2021-06-02 | End: 2021-06-02

## 2021-06-02 RX ADMIN — Medication 1 TABLET(S): at 12:56

## 2021-06-02 RX ADMIN — Medication 1 MILLIGRAM(S): at 12:56

## 2021-06-02 RX ADMIN — Medication 10 MILLIGRAM(S): at 12:55

## 2021-06-02 NOTE — PROGRESS NOTE ADULT - ATTENDING COMMENTS
65F with no known PMH who presented to the ED with headache and vision changes, found to have an intraparenchymal hemorrhage on CTH and indeterminate infiltrative liver mass concerning for neoplastic process.    # Liver Mass - HBV antigen positive raising concern for HCC, HBV viral load pending, remainder of hep panel sent. MRI liver planned for today.  # ICH - left occipital hemorrhage with vasogenic edema in the left parietal-occipital region stable on repeat CT. Hemorrhagic lesion from metastasis remains high on differential. Will need MRI brain with anesthesia (severe claustrophobia).  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 INR increasing will give another 10mg of PO Vit K.   # Pulmonary Nodules - in setting of above concerning for metastatic lesions    Discussed with HS5

## 2021-06-02 NOTE — PROGRESS NOTE ADULT - SUBJECTIVE AND OBJECTIVE BOX
Interval Events:   - No acute events  - Awaiting MRI    Allergies:  No Known Allergies      Hospital Medications:  folic acid 1 milliGRAM(s) Oral daily  multivitamin 1 Tablet(s) Oral daily      PMHX/PSHX:  No pertinent past medical history    No significant past surgical history        Family history:  No pertinent family history in first degree relatives        ROS:   General:  +wt loss, fevers, chills, night sweats,+ fatigue  Eyes:  blurry vision, no reported pain  ENT:  No sore throat, pain, runny nose, dysphagia  CV:  No pain, palpitations, hypo/hypertension  Pulm:  No dyspnea, cough, tachypnea, wheezing  GI:  As per HPI  :  No pain, bleeding, incontinence, nocturia  Muscle:  No pain, weakness  Neuro:  No weakness, tingling, memory problems  Psych:  +fatigue, insomnia, mood problems, depression  Endocrine:  No polyuria, polydipsia, cold/heat intolerance  Heme:  No petechiae, ecchymosis, easy bruisability  Skin:  No rash, tattoos, scars, edema      PHYSICAL EXAM:   Vital Signs:  Vital Signs Last 24 Hrs  T(C): 36.7 (02 Jun 2021 04:20), Max: 37.1 (01 Jun 2021 18:10)  T(F): 98 (02 Jun 2021 04:20), Max: 98.8 (01 Jun 2021 18:10)  HR: 81 (02 Jun 2021 04:20) (71 - 83)  BP: 138/76 (02 Jun 2021 04:20) (124/64 - 144/80)  BP(mean): --  RR: 18 (02 Jun 2021 04:20) (17 - 18)  SpO2: 100% (02 Jun 2021 04:20) (98% - 100%)  Daily     Daily     GENERAL:  No acute distress  HEENT:  Normocephalic/atraumatic, +scleral icterus  CHEST:  No accessory muscle use  HEART:  Regular rate and rhythm  ABDOMEN:  Soft, non-tender, distended  EXTREMITIES: No cyanosis, clubbing, or edema  SKIN:  No rash  NEURO:  Alert and oriented x 3, no asterixis    LABS:                        12.0   6.30  )-----------( 120      ( 02 Jun 2021 03:49 )             36.0     Mean Cell Volume: 98.1 fL (06-02-21 @ 03:49)    06-02    138  |  106  |  6<L>  ----------------------------<  118<H>  3.8   |  21<L>  |  0.59    Ca    8.7      02 Jun 2021 03:49  Phos  3.7     06-02  Mg     1.7     06-02    TPro  6.6  /  Alb  2.8<L>  /  TBili  2.8<H>  /  DBili  x   /  AST  56<H>  /  ALT  31  /  AlkPhos  112  06-02    LIVER FUNCTIONS - ( 02 Jun 2021 03:49 )  Alb: 2.8 g/dL / Pro: 6.6 g/dL / ALK PHOS: 112 U/L / ALT: 31 U/L / AST: 56 U/L / GGT: x           PT/INR - ( 02 Jun 2021 03:49 )   PT: 21.4 sec;   INR: 1.92 ratio         PTT - ( 02 Jun 2021 03:49 )  PTT:40.4 sec                            12.0   6.30  )-----------( 120      ( 02 Jun 2021 03:49 )             36.0                         12.0   5.87  )-----------( 120      ( 01 Jun 2021 07:24 )             35.4                         11.9   5.34  )-----------( 121      ( 31 May 2021 06:33 )             35.8       Imaging:

## 2021-06-02 NOTE — PROGRESS NOTE ADULT - PROBLEM SELECTOR PLAN 4
s/p CIWA: patient denied heavy ETOH use, but daughter reports patient is drinking heavily  LEON [email or web address]  c/w thiamine, folic acid, MV

## 2021-06-02 NOTE — PROGRESS NOTE ADULT - ASSESSMENT
65F with no known PMH who presented to the ED with headache and vision changes, found to have an intraparenchymal hemorrhage on CTH and indeterminate infiltrative liver mass concerning for metastatic neoplasm, s/p MICU stay for q1 hour neuro checks. Labs concerning for convalescent hepatitis B. Pending follow up labs/imaging for chronicity and possibility of metastatic HCC.

## 2021-06-02 NOTE — PROGRESS NOTE ADULT - SUBJECTIVE AND OBJECTIVE BOX
Neurology Progress Note    S: Patient seen and examined. No new events overnight. patient denied CP, SOB, HA or pain. no change. pending MRI    Medication:  MEDICATIONS  (STANDING):  folic acid 1 milliGRAM(s) Oral daily  multivitamin 1 Tablet(s) Oral daily  phytonadione   Solution 10 milliGRAM(s) Oral once    MEDICATIONS  (PRN):        Vitals:  Vital Signs Last 24 Hrs  T(C): 36.7 (02 Jun 2021 04:20), Max: 37.1 (01 Jun 2021 18:10)  T(F): 98 (02 Jun 2021 04:20), Max: 98.8 (01 Jun 2021 18:10)  HR: 81 (02 Jun 2021 04:20) (73 - 83)  BP: 138/76 (02 Jun 2021 04:20) (124/64 - 144/80)  BP(mean): --  RR: 18 (02 Jun 2021 04:20) (17 - 18)  SpO2: 100% (02 Jun 2021 04:20) (98% - 100%)    General Exam:   General Appearance: Appropriately dressed and in no acute distress       Head: Normocephalic, atraumatic and no dysmorphic features  Ear, Nose, and Throat: Moist mucous membranes  CVS: S1S2+  Resp: No SOB, no wheeze or rhonchi  Abd: soft NTND  Extremities: No edema, no cyanosis  Skin: No bruises, no rashes     Neurological Exam:  Mental Status: Awake, alert and oriented x 3.  Able to follow simple and complex verbal commands. Able to name and repeat. fluent speech. No obvious aphasia or dysarthria noted.   Cranial Nerves: PERRL, EOMI, RHHA , sensation V1-V3 intact, R facial asymmetry , equal elevation of palate, scm/trap 5/5, tongue is midline on protrusion. no obvious papilledema on fundoscopic exam. Hearing is grossly intact.   Motor: Normal bulk, tone and strength throughout. Fine finger movements were intact and symmetric. no tremors or drift noted.    Sensation: Intact to light touch and pinprick throughout. no right/left confusion. no extinction to tactile on DSS.  Reflexes: 1+ throughout at biceps, brachioradialis, triceps, patellars and ankles bilaterally and equal. No clonus. R toe and L toe were both downgoing.  Coordination: No dysmetria on FNF   Gait: deferred     I personally reviewed the below data/images/labs:      CBC Full  -  ( 02 Jun 2021 03:49 )  WBC Count : 6.30 K/uL  RBC Count : 3.67 M/uL  Hemoglobin : 12.0 g/dL  Hematocrit : 36.0 %  Platelet Count - Automated : 120 K/uL  Mean Cell Volume : 98.1 fL  Mean Cell Hemoglobin : 32.7 pg  Mean Cell Hemoglobin Concentration : 33.3 gm/dL  Auto Neutrophil # : x  Auto Lymphocyte # : x  Auto Monocyte # : x  Auto Eosinophil # : x  Auto Basophil # : x  Auto Neutrophil % : x  Auto Lymphocyte % : x  Auto Monocyte % : x  Auto Eosinophil % : x  Auto Basophil % : x    06-02    138  |  106  |  6<L>  ----------------------------<  118<H>  3.8   |  21<L>  |  0.59    Ca    8.7      02 Jun 2021 03:49  Phos  3.7     06-02  Mg     1.7     06-02    TPro  6.6  /  Alb  2.8<L>  /  TBili  2.8<H>  /  DBili  x   /  AST  56<H>  /  ALT  31  /  AlkPhos  112  06-02        CT head w/o contrast (20:00): IMPRESSION:   2.4 x 3.0 x 2.4 cm left occipital acute intraparenchymal hemorrhage with associated vasogenic edema and mass effect as described. There is no midline shift.    Repeat CT head w/o contrast (2357):   CT brain: Unchanged left occipital acute intraparenchymal hemorrhage. No active bleeding.    CT angiography neck: No hemodynamically significant stenosis by NASCET criteria. No vascular dissection.    CT angiography brain: No major vessel occlusion or proximal stenosis. No aneurysm or vascular malformation.  < from: CT Head No Cont (05.29.21 @ 09:23) >    EXAM:  CT BRAIN        PROCEDURE DATE:  May 29 2021         INTERPRETATION:  INDICATION:  Left occipital hemorrhage. Follow-up.  TECHNIQUE:  A non contrast 2.5 or 3 mm axial CT study of the brain was performed from skull base to vertex. Coronal andsagittal reformations were generated from the axial data.  COMPARISON EXAMINATION:  CT dated 5/28/2021.    FINDINGS:    HEMISPHERES:  A subacute hematoma is again identified in the left occipital region. This is unchanged in size without evidence of further hemorrhage. There is underlying white matter edema in the left the parietal and occipital region with an appearance suggesting vasogenic edema. A hemorrhagic met, therefore, cannot be ruled out. Follow-up MR imaging with and without gadoliniumrecommended for further assessment along with further clinical correlation. There are no other areas of hemorrhage.  VENTRICLES:  Midline and normal in size.  POSTERIOR FOSSA:  The brain stem and cerebellum are unremarkable.  No CP angle lesion noted.  EXTRACEREBRAL SPACES:  No subdural or epidural collections are noted.  SKULL BASE AND CALVARIUM:  Appears intact.  No fracture or destructive lesion is identified.  SINUSES AND MASTOIDS:  Clear.  MISCELLANEOUS:  No orbital or suprasellar abnormality noted.    IMPRESSION:    1)  subacute left occipital hemorrhage with vasogenic edema in the left parietal-occipital region. A hemorrhagic metastasis is within the differential. Further workup and assessment recommended. Follow-up MR imaging with andwithout gadolinium may be considered for further evaluation.  2)  no significant midline shift shift or hydrocephalus.    No significant change when compared with prior CT.          MIKO CHARLES MD; Attending Radiologist  This document has been electronically signed. May 29 2021  9:33AM    < end of copied text >  < from: Transthoracic Echocardiogram (05.31.21 @ 09:01) >    Patient name: MENDOZA, GRISELDA  YOB: 1955   Age: 65 (F)   MR#: [id number]  Study Date: 5/31/2021  Location: 616A Bubble StudySonographer: Jessica Elliott RDCS  Study quality: Technically good  Referring Physician: Cristino Han MD  Blood Pressure: 134/86 mmHg  Height: 154 cm  Weight: 59 kg  BSA: 1.6 m2  ------------------------------------------------------------------------  PROCEDURE: Transthoracic echocardiogram with 2-D, M-Mode  and complete spectral and color flow Doppler. Patient was  injected with 10 cc's of aerosolized saline.  Patient was injected with 10 cc's of aerosolized saline.  INDICATION: Cerebral infarction, unspecified (I63.9)  ------------------------------------------------------------------------  DIMENSIONS:  Dimensions:     Normal Values:  LA:     3.4 cm    2.0 - 4.0 cm  Ao:     2.4 cm    2.0 - 3.8 cm  SEPTUM: 0.7 cm    0.6 - 1.2 cm  PWT:    0.8 cm    0.6 - 1.1 cm  LVIDd:  4.3 cm    3.0 - 5.6 cm  LVIDs:  2.6 cm    1.8 - 4.0 cm  Derived Variables:  LVMI: 62 g/m2  RWT: 0.37  Fractional short: 40 %  Ejection Fraction (Teicholtz): 70 %  ------------------------------------------------------------------------  OBSERVATIONS:  Mitral Valve: Normal mitral valve. Mild-moderate mitral  regurgitation.  Aortic Root: Normal aortic root.  Aortic Valve: Normal trileaflet aortic valve.  Left Atrium: Normal left atrium.  LA volume index = 18  cc/m2.  Left Ventricle: Normal left ventricular systolic function.  No segmental wall motion abnormalities. Normal left  ventricular internal dimensions and wall thicknesses.  Normal left ventricular diastolic function.  Right Heart: Normal right atrium. Normal right ventricular  size and function. Normal tricuspid valve. Mild tricuspid  regurgitation. Normal pulmonic valve.  Pericardium/PleuraNormal pericardium with no pericardial  effusion.  Hemodynamic: Estimated right ventricular systolic pressure  equals 40 mm Hg, assuming right atrial pressure equals 10  mm Hg, consistent with mild pulmonary hypertension.  Agitated saline injection demonstrates no evidence of a  patent foramen ovale.  ------------------------------------------------------------------------  CONCLUSIONS:  1. Normal mitral valve. Mild-moderate mitral regurgitation.  2. Normal trileafletaortic valve.  3. Normal left ventricular internal dimensions and wall  thicknesses.  4. Normal left ventricular systolic function. No segmental  wall motion abnormalities.  5. Normal left ventricular diastolic function.  6. Normal right ventricular size and function.  7. Normal tricuspid valve. Mild tricuspid regurgitation.  8. Estimated pulmonary artery systolic pressure equals 40  mm Hg, assuming right atrial pressure equals 10  mm Hg,  consistent with mild pulmonary hypertension.  9. Agitatedsaline injection demonstrates no evidence of a  patent foramen ovale.  *** No previous Echo exam.  ------------------------------------------------------------------------  Confirmed on  5/31/2021 - 10:02:23 by Dalia Kimball MD  ------------------------------------------------------------------------    < end of copied text >  A1C with Estimated Average Glucose in AM (06.01.21 @ 07:28)   A1C with Estimated Average Glucose Result: 5.0:   LDL Cholesterol Calculated: 93 mg/dL (06.01.21 @ 07:24)

## 2021-06-02 NOTE — SBIRT NOTE ADULT - NSSBIRTALCPOSREINDET_GEN_A_CORE
Pt AAOx3, euthymic mood and congruent affect, speech clear and concise, behavior appropriate to this writer. Pt receptive to engagement in consult and agreeable to reviewing healthy drinking guidelines.   Provided SBIRT services: Full screen Positive. Positive reinforcement provided given patient currently within healthy guidelines. Education materials reviewed and given to patient.   Pt notes she has not had a drink in 2-3 months.

## 2021-06-02 NOTE — PROGRESS NOTE ADULT - PROBLEM SELECTOR PLAN 3
hepatitis panel with + HBsAg, -HBsAb, -HBcAb IgM, +HBcAb IgG, -HBeAg, +HBeAb = convalescent chronic HBV  -f/u delta agent and viral load  -appreciate hepatology recs  -HIV negative  -AFP negative

## 2021-06-02 NOTE — PROGRESS NOTE ADULT - ATTENDING COMMENTS
Patient seen and examined with the liver team. I agree with the plan as above. Awaiting HBV DNA, if positive, I would start anti HBV therapy. Awaiting MRI to evaluate liver lesion

## 2021-06-02 NOTE — PROGRESS NOTE ADULT - SUBJECTIVE AND OBJECTIVE BOX
*******************************************************  Pedro Navarro MD PGY-1  Internal Medicine  Availble on [employer] Teams  Pager [facility]) [phone] / [phone]  *******************************************************  Patient is a 65y old  Female who presents with a chief complaint of Intraparenchymal Hemorrhage ICH (02 Jun 2021 10:30)      SUBJECTIVE / OVERNIGHT EVENTS:  - No acute events overnight.   - Patient seen and evaluated at bedside.  - Patient reports anxiety at idea of MRI but otherwise without complaints.  - ROS: Denies fevers/chills, headache, SOB at rest, cough, chest pain, palpitations, abdominal pain, nausea/vomiting/diarrhea/constipation, melena/hematochezia, dysuria, and hematuria    ------------------------------------------------------------------------------------------------------------    MEDICATIONS  (STANDING):  folic acid 1 milliGRAM(s) Oral daily  multivitamin 1 Tablet(s) Oral daily  phytonadione   Solution 10 milliGRAM(s) Oral once    MEDICATIONS  (PRN):      ------------------------------------------------------------------------------------------------------------    OBJECTIVE:    CAPILLARY BLOOD GLUCOSE        I&O's Summary    Daily     Daily   Weight (kg): 54 (05-29-21 @ 02:43)    PHYSICAL EXAM:  T(F): 98, Max: 98.8 (06-01-21 @ 18:10)  HR: 81 (73 - 83)  BP: 138/76 (124/64 - 144/80)  RR: 18 (17 - 18)  SpO2: 100% (98% - 100%)        CONSTITUTIONAL: NAD, well-developed  HEENT: NCAT, EOMI, no scleral icterus, MMM, Mallampati 3  RESPIRATORY/CHEST: Normal respiratory effort; lungs are clear to auscultation bilaterally; no wheezing/crackles  CARDIO: Regular rate, normal S1 and S2, +systolic 2/6 murmur. No rub/gallop; No JVD  VASCULAR: No lower extremity edema; Peripheral pulses are 2+ bilaterally; Capillary refill brisk  ABDOMEN: Soft, nontender to palpation, normoactive bowel sounds, no rebound/guarding; No hepatosplenomegaly  MUSCULOSKELETAL: no joint swelling or tenderness to palpation, full strength all extremities.  EXTREMITIES: hands/feet are warm, and without cyanosis or clubbing  SKIN: no visible rashes, pallor, diaphoresis, or jaundice  NEURO: No focal deficits, CN 3-12 intact, PERRLA. intact strength and sensation to light touch in b/l UE and LE  PSYCH: A+O to person, place, and time; affect appropriate; cooperative        ------------------------------------------------------------------------------------------------------------  LABS:                        12.0   6.30  )-----------( 120      ( 02 Jun 2021 03:49 )             36.0     06-02    138  |  106  |  6<L>  ----------------------------<  118<H>  3.8   |  21<L>  |  0.59    Ca    8.7      02 Jun 2021 03:49  Phos  3.7     06-02  Mg     1.7     06-02    TPro  6.6  /  Alb  2.8<L>  /  TBili  2.8<H>  /  DBili  x   /  AST  56<H>  /  ALT  31  /  AlkPhos  112  06-02    PT/INR - ( 02 Jun 2021 03:49 )   PT: 21.4 sec;   INR: 1.92 ratio         PTT - ( 02 Jun 2021 03:49 )  PTT:40.4 sec            RADIOLOGY & ADDITIONAL TESTS:  Results Reviewed:     Imaging Personally Reviewed:  Electrocardiogram Personally Reviewed:      COORDINATION OF CARE:  Care discussed with consultants/other providers and notes reviewed [Y]:   ------------------------------------------------------------------------------------------------------------

## 2021-06-02 NOTE — PROGRESS NOTE ADULT - PROBLEM SELECTOR PLAN 1
Patient with 2.4 x 3.0 x 2.4 cm left occipital acute intraparenchymal hemorrhage with associated vasogenic edema and mass effect, no midline shift. CTA head/neck 5 hrs later with unchanged left occipital acute intraparenchymal hemorrhage. No active bleeding. Imaging and exam stable.  - INR increased to 1.8 today - giving another 10mg PO vitamin K  - platelets goal >100 (stable at 120, but above goal)  - per neuro can d/c AED  - Neuro following, will determine the need for steroid after MRI brain and orbits  - MRI brain and orbits wwo C for stroke workup and rule out underlying lesion  - rEEG after MRI

## 2021-06-02 NOTE — PROGRESS NOTE ADULT - ASSESSMENT
64yo F with no known PMH who presented to the ED with headache and vision changes. Hepatology consulted for liver lesion and HBV.    # Infiltrative liver lesion - concerning for malignancy, likely HCC in the setting of chronic HBV. Would hold off from biopsy until MRI abdomen results.   # Elevated liver tests - mostly bilirubin elevation, potentially in the settings of an infiltrative disease vs. worsening hepatic dysfunction in the settings of chronic HBV/EtOH use (c/w elevated INR and bilirubin). No cirrhosis on imaging or evidence of portal hypertension.  # HBV - Chronic HBV. Possibly contracted from  (who had a liver disease). No clear exposure.   # ICH - possibly in the settings of coagulopathy, stable    Recommendations:  - MRI liver protocol for HCC evaluation  - Please get AFP  - HBV DNA and delta agent pending  - Daily CMP, INR, CBC  - Thiamine, folate, MVI    Thank you for involving us in the care of this patient. Please reach out if any further questions.    Jabari Lara, PGY-4  Gastroenterology Fellow    Available on Microsoft Teams  Pager [phone] ([facility]) or [phone] ([facility])  After 5PM/Weekends, please contact the on-call GI fellow: [phone]  Available through [employer] Teams

## 2021-06-02 NOTE — PROGRESS NOTE ADULT - ASSESSMENT
64 yo RH AAF p/w HA and vision changes.  found to have L occipital IPH. on exam with R HHA and mild R facial  repeat CTH stable 5/29. also found to have liver mass. INR was ~2 on arrival s/p vit K. now on keppra. CTA H/N neg, TTE as above, A1c 5%, LDL 93mg/dL.   Etiology of IPH may be 2/2 HTN, mets, CAA, coagulaopathy from liver disease, needs further workup. INR slowly rising   - vitK rising INR   - MRI brain w/ and w/o pending   - MRI orbits   - no role for steroids from neurostandpoint  unless mass found intracranially   - SBP<160  - on keppra 500mg BID but no seizures noted. no need for AED unless has seizure  - rEEG  - cancer workup given pulmonary nodules and liver mass.    - PT/OT  - hepatology recs appreciated, HCC workup in progress, chronic hep B?  - tele   - check FS, glucose control <180  - GI/DVT ppx  - Counseling on diet, exercise, and medication adherence was done  - Counseling on smoking cessation and alcohol consumption offered when appropriate.  - Pain assessed and judicious use of narcotics when appropriate was discussed.    - Stroke education given when appropriate.  - Importance of fall prevention discussed.   - Differential diagnosis and plan of care discussed with patient and/or family and primary team  - Thank you for allowing me to participate in the care of this patient. Call with questions.   Suleman Valadez MD  Vascular Neurology .

## 2021-06-02 NOTE — PROGRESS NOTE ADULT - PROBLEM SELECTOR PLAN 2
CTAP with Indeterminate infiltrative liver mass; concerning for malignancy  - h/o alcohol use, no signs of withdrawal currently, can d/c CIWA  - CT revealed pulmonary nodules, liver mass, and possible hemorrhagic brain mets; Will need malignancy workup  - MR liver protocol 6/2  - IR to assess for liver biopsy based on MRI  - acute hepatitis panel concerning for HBV    Malignancy Screening:  Hgb 13.6 on admission  Cancer hx: no personal hx of cancers; 2 sisters passed with gastric cancer, daughter has breast cancer  Cancer screening:  Mammo and US wnl last year, but has had multiple workup/biopsies in the past for abnormal findings on mammo (all benign findings);   Last colonoscopy 3 years ago, normal finding per report  Never had EGD  PAP smear last year, reportedly normal

## 2021-06-03 LAB
ALBUMIN SERPL ELPH-MCNC: 2.8 G/DL — LOW (ref 3.3–5)
ALP SERPL-CCNC: 100 U/L — SIGNIFICANT CHANGE UP (ref 40–120)
ALT FLD-CCNC: 28 U/L — SIGNIFICANT CHANGE UP (ref 4–33)
ANION GAP SERPL CALC-SCNC: 13 MMOL/L — SIGNIFICANT CHANGE UP (ref 7–14)
APTT BLD: 38.8 SEC — HIGH (ref 27–36.3)
AST SERPL-CCNC: 58 U/L — HIGH (ref 4–32)
BILIRUB SERPL-MCNC: 2.8 MG/DL — HIGH (ref 0.2–1.2)
BUN SERPL-MCNC: 7 MG/DL — SIGNIFICANT CHANGE UP (ref 7–23)
CALCIUM SERPL-MCNC: 8.9 MG/DL — SIGNIFICANT CHANGE UP (ref 8.4–10.5)
CHLORIDE SERPL-SCNC: 103 MMOL/L — SIGNIFICANT CHANGE UP (ref 98–107)
CO2 SERPL-SCNC: 19 MMOL/L — LOW (ref 22–31)
CREAT SERPL-MCNC: 0.59 MG/DL — SIGNIFICANT CHANGE UP (ref 0.5–1.3)
GLUCOSE SERPL-MCNC: 109 MG/DL — HIGH (ref 70–99)
HCT VFR BLD CALC: 35.4 % — SIGNIFICANT CHANGE UP (ref 34.5–45)
HGB BLD-MCNC: 11.9 G/DL — SIGNIFICANT CHANGE UP (ref 11.5–15.5)
INR BLD: 1.8 RATIO — HIGH (ref 0.88–1.16)
MAGNESIUM SERPL-MCNC: 1.9 MG/DL — SIGNIFICANT CHANGE UP (ref 1.6–2.6)
MCHC RBC-ENTMCNC: 33 PG — SIGNIFICANT CHANGE UP (ref 27–34)
MCHC RBC-ENTMCNC: 33.6 GM/DL — SIGNIFICANT CHANGE UP (ref 32–36)
MCV RBC AUTO: 98.1 FL — SIGNIFICANT CHANGE UP (ref 80–100)
NRBC # BLD: 0 /100 WBCS — SIGNIFICANT CHANGE UP
NRBC # FLD: 0 K/UL — SIGNIFICANT CHANGE UP
PHOSPHATE SERPL-MCNC: 3.7 MG/DL — SIGNIFICANT CHANGE UP (ref 2.5–4.5)
PLATELET # BLD AUTO: 109 K/UL — LOW (ref 150–400)
POTASSIUM SERPL-MCNC: 3.6 MMOL/L — SIGNIFICANT CHANGE UP (ref 3.5–5.3)
POTASSIUM SERPL-SCNC: 3.6 MMOL/L — SIGNIFICANT CHANGE UP (ref 3.5–5.3)
PROT SERPL-MCNC: 6.7 G/DL — SIGNIFICANT CHANGE UP (ref 6–8.3)
PROTHROM AB SERPL-ACNC: 20 SEC — HIGH (ref 10.6–13.6)
RBC # BLD: 3.61 M/UL — LOW (ref 3.8–5.2)
RBC # FLD: 15 % — HIGH (ref 10.3–14.5)
SODIUM SERPL-SCNC: 135 MMOL/L — SIGNIFICANT CHANGE UP (ref 135–145)
WBC # BLD: 5.64 K/UL — SIGNIFICANT CHANGE UP (ref 3.8–10.5)
WBC # FLD AUTO: 5.64 K/UL — SIGNIFICANT CHANGE UP (ref 3.8–10.5)

## 2021-06-03 PROCEDURE — 99233 SBSQ HOSP IP/OBS HIGH 50: CPT | Mod: GC

## 2021-06-03 PROCEDURE — 74182 MRI ABDOMEN W/CONTRAST: CPT | Mod: 26

## 2021-06-03 PROCEDURE — 99232 SBSQ HOSP IP/OBS MODERATE 35: CPT | Mod: GC

## 2021-06-03 RX ORDER — TENOFOVIR DISOPROXIL FUMARATE 300 MG/1
300 TABLET, FILM COATED ORAL DAILY
Refills: 0 | Status: DISCONTINUED | OUTPATIENT
Start: 2021-06-03 | End: 2021-06-09

## 2021-06-03 RX ADMIN — Medication 1 MILLIGRAM(S): at 11:20

## 2021-06-03 RX ADMIN — Medication 2 MILLIGRAM(S): at 13:07

## 2021-06-03 RX ADMIN — TENOFOVIR DISOPROXIL FUMARATE 300 MILLIGRAM(S): 300 TABLET, FILM COATED ORAL at 17:31

## 2021-06-03 RX ADMIN — Medication 1 TABLET(S): at 11:20

## 2021-06-03 NOTE — PROGRESS NOTE ADULT - PROBLEM SELECTOR PLAN 3
ambulated to bathroom hepatitis panel with + HBsAg, -HBsAb, -HBcAb IgM, +HBcAb IgG, -HBeAg, +HBeAb = convalescent chronic HBV  -viral load low  -f/u delta agent  -appreciate hepatology recs  -HIV negative  -AFP negative

## 2021-06-03 NOTE — PROGRESS NOTE ADULT - PROBLEM SELECTOR PLAN 2
CTAP with Indeterminate infiltrative liver mass; concerning for malignancy  - h/o alcohol use, no signs of withdrawal currently, can d/c CIWA  - CT revealed pulmonary nodules, liver mass, and possible hemorrhagic brain mets; Will need malignancy workup  - MR liver protocol 6/3  - IR to assess for liver biopsy based on MRI  - acute hepatitis panel concerning for HBV    Malignancy Screening:  Hgb 13.6 on admission  Cancer hx: no personal hx of cancers; 2 sisters passed with gastric cancer, daughter has breast cancer  Cancer screening:  Mammo and US wnl last year, but has had multiple workup/biopsies in the past for abnormal findings on mammo (all benign findings);   Last colonoscopy 3 years ago, normal finding per report  Never had EGD  PAP smear last year, reportedly normal

## 2021-06-03 NOTE — PROGRESS NOTE ADULT - ATTENDING COMMENTS
65F with no known PMH who presented to the ED with headache and vision changes, found to have an intraparenchymal hemorrhage on CTH and indeterminate infiltrative liver mass concerning for neoplastic process.    # Liver Mass - HBV antigen positive raising concern for HCC, HBV viral load positive. MRI liver planned for today. Starting on tenofovir per Hepatology  # ICH - left occipital hemorrhage with vasogenic edema in the left parietal-occipital region stable on repeat CT. Hemorrhagic lesion from metastasis remains high on differential. Will need MRI brain with anesthesia (severe claustrophobia).  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 INR 1.8 after second dose Vit K.   # Pulmonary Nodules - in setting of above concerning for metastatic lesions    Discussed with HS5

## 2021-06-03 NOTE — PROGRESS NOTE ADULT - SUBJECTIVE AND OBJECTIVE BOX
*******************************************************  Pedro Navarro MD PGY-1  Internal Medicine  Availble on [employer] Teams  Pager [facility]) [phone] / [phone]  *******************************************************  Patient is a 65y old  Female who presents with a chief complaint of Intraparenchymal Hemorrhage ICH (02 Jun 2021 11:46)    SUBJECTIVE / OVERNIGHT EVENTS:  - No acute events overnight.   - Patient seen and evaluated at bedside.  - No complaints this AM.  - ROS: Denies fevers/chills, headache, SOB at rest, cough, chest pain, palpitations, abdominal pain, nausea/vomiting/diarrhea/constipation, melena/hematochezia, dysuria, and hematuria    ------------------------------------------------------------------------------------------------------------    MEDICATIONS  (STANDING):  folic acid 1 milliGRAM(s) Oral daily  multivitamin 1 Tablet(s) Oral daily    MEDICATIONS  (PRN):      ------------------------------------------------------------------------------------------------------------    OBJECTIVE:    CAPILLARY BLOOD GLUCOSE        I&O's Summary    Daily     Daily   Weight (kg): 54 (05-29-21 @ 02:43)    PHYSICAL EXAM:  T(F): 98.1, Max: 98.4 (06-02-21 @ 22:00)  HR: 76 (70 - 83)  BP: 120/71 (111/- - 132/60)  RR: 18 (17 - 18)  SpO2: 98% (97% - 100%)    CONSTITUTIONAL: NAD, well-developed  HEENT: NCAT, EOMI, no scleral icterus, MMM, Mallampati 3  RESPIRATORY/CHEST: Normal respiratory effort; lungs are clear to auscultation bilaterally; no wheezing/crackles  CARDIO: Regular rate, normal S1 and S2, +systolic 2/6 murmur. No rub/gallop; No JVD  VASCULAR: No lower extremity edema; Peripheral pulses are 2+ bilaterally; Capillary refill brisk  ABDOMEN: Soft, nontender to palpation, normoactive bowel sounds, no rebound/guarding; No hepatosplenomegaly  MUSCULOSKELETAL: no joint swelling or tenderness to palpation, full strength all extremities.  EXTREMITIES: hands/feet are warm, and without cyanosis or clubbing  SKIN: no visible rashes, pallor, diaphoresis, or jaundice  NEURO: No focal deficits, CN 3-12 intact, PERRLA. intact strength and sensation to light touch in b/l UE and LE  PSYCH: A+O to person, place, and time; affect appropriate; cooperative    ------------------------------------------------------------------------------------------------------------  LABS:                        11.9   5.64  )-----------( 109      ( 03 Jun 2021 07:05 )             35.4     06-03    135  |  103  |  7   ----------------------------<  109<H>  3.6   |  19<L>  |  0.59    Ca    8.9      03 Jun 2021 07:05  Phos  3.7     06-03  Mg     1.9     06-03    TPro  6.7  /  Alb  2.8<L>  /  TBili  2.8<H>  /  DBili  x   /  AST  58<H>  /  ALT  28  /  AlkPhos  100  06-03    PT/INR - ( 03 Jun 2021 07:05 )   PT: 20.0 sec;   INR: 1.80 ratio         PTT - ( 03 Jun 2021 07:05 )  PTT:38.8 sec            RADIOLOGY & ADDITIONAL TESTS:  Results Reviewed:     Imaging Personally Reviewed:  Electrocardiogram Personally Reviewed:      COORDINATION OF CARE:  Care discussed with consultants/other providers and notes reviewed [Y]:   ------------------------------------------------------------------------------------------------------------

## 2021-06-03 NOTE — PROGRESS NOTE ADULT - ASSESSMENT
64yo F with no known PMH who presented to the ED with headache and vision changes. Hepatology consulted for liver lesion and HBV.    # Infiltrative liver lesion - concerning for malignancy, likely HCC in the setting of chronic HBV. Would hold off from biopsy until MRI abdomen results.   # Elevated liver tests - mostly bilirubin elevation, potentially in the settings of an infiltrative disease vs. worsening hepatic dysfunction in the settings of chronic HBV/EtOH use (c/w elevated INR and bilirubin). No cirrhosis on imaging or evidence of portal hypertension.  # HBV - Chronic HBV. Possibly contracted from  (who had a liver disease). No clear exposure.   # ICH - possibly in the settings of coagulopathy, stable    Recommendations:  - MRI liver protocol for HCC evaluation  - HBV DNA and delta agent pending  - Daily CMP, INR, CBC  - Thiamine, folate, MVI    Thank you for involving us in the care of this patient. Please reach out if any further questions.    Jabari Lara, PGY-4  Gastroenterology Fellow    Available on Microsoft Teams  Pager [phone] ([facility]) or [phone] ([facility])  After 5PM/Weekends, please contact the on-call GI fellow: [phone]  Available through Microsoft Teams   66yo F with no known PMH who presented to the ED with headache and vision changes. Hepatology consulted for liver lesion and HBV.    # Infiltrative liver lesion - concerning for malignancy, likely HCC in the setting of chronic HBV. Would hold off from biopsy until MRI abdomen results.   # Elevated liver tests - mostly bilirubin elevation, potentially in the settings of an infiltrative disease vs. worsening hepatic dysfunction in the settings of chronic HBV/EtOH use (c/w elevated INR and bilirubin). No cirrhosis on imaging or evidence of portal hypertension.  # HBV - Chronic HBV. Possibly contracted from  (who had a liver disease). No clear exposure. HBVDNA positive with underlying cirrhosis. I would start tenovofir   # ICH - possibly in the settings of coagulopathy, stable    Recommendations:  - MRI liver protocol for HCC evaluation  - delta agent pending  - Daily CMP, INR, CBC  - Thiamine, folate, MVI    Thank you for involving us in the care of this patient. Please reach out if any further questions.    Jabari Lara, PGY-4  Gastroenterology Fellow    Available on Microsoft Teams  Pager [phone] ([facility]) or [phone] ([facility])  After 5PM/Weekends, please contact the on-call GI fellow: [phone]  Available through Microsoft Teams

## 2021-06-03 NOTE — PROGRESS NOTE ADULT - SUBJECTIVE AND OBJECTIVE BOX
Interval Events:   - MRI pending  - No acute events    Allergies:  No Known Allergies        Hospital Medications:  folic acid 1 milliGRAM(s) Oral daily  multivitamin 1 Tablet(s) Oral daily      PMHX/PSHX:  No pertinent past medical history    No significant past surgical history        Family history:  No pertinent family history in first degree relatives        ROS:   General:  No wt loss, fevers, chills, night sweats, fatigue,   Eyes:  Good vision, no reported pain  ENT:  No sore throat, pain, runny nose, dysphagia  CV:  No pain, palpitations, hypo/hypertension  Pulm:  No dyspnea, cough, tachypnea, wheezing  GI:  As per HPI  :  No pain, bleeding, incontinence, nocturia  Muscle:  No pain, weakness  Neuro:  No weakness, tingling, memory problems  Psych:  No fatigue, insomnia, mood problems, depression  Endocrine:  No polyuria, polydipsia, cold/heat intolerance  Heme:  No petechiae, ecchymosis, easy bruisability  Skin:  No rash, tattoos, scars, edema      PHYSICAL EXAM:   Vital Signs:  Vital Signs Last 24 Hrs  T(C): 36.7 (03 Jun 2021 11:18), Max: 36.9 (02 Jun 2021 22:00)  T(F): 98.1 (03 Jun 2021 11:18), Max: 98.4 (02 Jun 2021 22:00)  HR: 76 (03 Jun 2021 11:18) (70 - 83)  BP: 120/71 (03 Jun 2021 11:18) (120/71 - 132/60)  BP(mean): --  RR: 18 (03 Jun 2021 11:18) (17 - 18)  SpO2: 98% (03 Jun 2021 11:18) (97% - 98%)  Daily     Daily         GENERAL:  No acute distress  HEENT:  Normocephalic/atraumatic, +scleral icterus  CHEST:  No accessory muscle use  HEART:  Regular rate and rhythm  ABDOMEN:  Soft, non-tender, distended  EXTREMITIES: No cyanosis, clubbing, or edema  SKIN:  No rash  NEURO:  Alert and oriented x 3, no asterixis    LABS:                        11.9   5.64  )-----------( 109      ( 03 Jun 2021 07:05 )             35.4     Mean Cell Volume: 98.1 fL (06-03-21 @ 07:05)    06-03    135  |  103  |  7   ----------------------------<  109<H>  3.6   |  19<L>  |  0.59    Ca    8.9      03 Jun 2021 07:05  Phos  3.7     06-03  Mg     1.9     06-03    TPro  6.7  /  Alb  2.8<L>  /  TBili  2.8<H>  /  DBili  x   /  AST  58<H>  /  ALT  28  /  AlkPhos  100  06-03    LIVER FUNCTIONS - ( 03 Jun 2021 07:05 )  Alb: 2.8 g/dL / Pro: 6.7 g/dL / ALK PHOS: 100 U/L / ALT: 28 U/L / AST: 58 U/L / GGT: x           PT/INR - ( 03 Jun 2021 07:05 )   PT: 20.0 sec;   INR: 1.80 ratio         PTT - ( 03 Jun 2021 07:05 )  PTT:38.8 sec                            11.9   5.64  )-----------( 109      ( 03 Jun 2021 07:05 )             35.4                         12.0   6.30  )-----------( 120      ( 02 Jun 2021 03:49 )             36.0                         12.0   5.87  )-----------( 120      ( 01 Jun 2021 07:24 )             35.4       Imaging:

## 2021-06-03 NOTE — PROGRESS NOTE ADULT - ATTENDING COMMENTS
Patient seen and examined with the liver team. I agree with the plan as above.  Her HBVDNA is positive. As she has underlying cirrhosis, I would start tenovifir (TDF) 300 mg today and treatment with be indefinite.  Awaiting MRI to assess for HCC

## 2021-06-04 LAB
ALBUMIN SERPL ELPH-MCNC: 2.8 G/DL — LOW (ref 3.3–5)
ALP SERPL-CCNC: 101 U/L — SIGNIFICANT CHANGE UP (ref 40–120)
ALT FLD-CCNC: 30 U/L — SIGNIFICANT CHANGE UP (ref 4–33)
ANION GAP SERPL CALC-SCNC: 10 MMOL/L — SIGNIFICANT CHANGE UP (ref 7–14)
AST SERPL-CCNC: 65 U/L — HIGH (ref 4–32)
BILIRUB SERPL-MCNC: 2.5 MG/DL — HIGH (ref 0.2–1.2)
BUN SERPL-MCNC: 6 MG/DL — LOW (ref 7–23)
CALCIUM SERPL-MCNC: 8.4 MG/DL — SIGNIFICANT CHANGE UP (ref 8.4–10.5)
CHLORIDE SERPL-SCNC: 106 MMOL/L — SIGNIFICANT CHANGE UP (ref 98–107)
CO2 SERPL-SCNC: 21 MMOL/L — LOW (ref 22–31)
CREAT SERPL-MCNC: 0.59 MG/DL — SIGNIFICANT CHANGE UP (ref 0.5–1.3)
GLUCOSE SERPL-MCNC: 111 MG/DL — HIGH (ref 70–99)
HCT VFR BLD CALC: 36.2 % — SIGNIFICANT CHANGE UP (ref 34.5–45)
HGB BLD-MCNC: 11.9 G/DL — SIGNIFICANT CHANGE UP (ref 11.5–15.5)
INR BLD: 1.72 RATIO — HIGH (ref 0.88–1.16)
MAGNESIUM SERPL-MCNC: 1.7 MG/DL — SIGNIFICANT CHANGE UP (ref 1.6–2.6)
MCHC RBC-ENTMCNC: 32.5 PG — SIGNIFICANT CHANGE UP (ref 27–34)
MCHC RBC-ENTMCNC: 32.9 GM/DL — SIGNIFICANT CHANGE UP (ref 32–36)
MCV RBC AUTO: 98.9 FL — SIGNIFICANT CHANGE UP (ref 80–100)
NRBC # BLD: 0 /100 WBCS — SIGNIFICANT CHANGE UP
NRBC # FLD: 0 K/UL — SIGNIFICANT CHANGE UP
PHOSPHATE SERPL-MCNC: 3.6 MG/DL — SIGNIFICANT CHANGE UP (ref 2.5–4.5)
PLATELET # BLD AUTO: 116 K/UL — LOW (ref 150–400)
POTASSIUM SERPL-MCNC: 3.7 MMOL/L — SIGNIFICANT CHANGE UP (ref 3.5–5.3)
POTASSIUM SERPL-SCNC: 3.7 MMOL/L — SIGNIFICANT CHANGE UP (ref 3.5–5.3)
PROT SERPL-MCNC: 6.6 G/DL — SIGNIFICANT CHANGE UP (ref 6–8.3)
PROTHROM AB SERPL-ACNC: 19.3 SEC — HIGH (ref 10.6–13.6)
RBC # BLD: 3.66 M/UL — LOW (ref 3.8–5.2)
RBC # FLD: 15 % — HIGH (ref 10.3–14.5)
SODIUM SERPL-SCNC: 137 MMOL/L — SIGNIFICANT CHANGE UP (ref 135–145)
WBC # BLD: 5.29 K/UL — SIGNIFICANT CHANGE UP (ref 3.8–10.5)
WBC # FLD AUTO: 5.29 K/UL — SIGNIFICANT CHANGE UP (ref 3.8–10.5)

## 2021-06-04 PROCEDURE — 99233 SBSQ HOSP IP/OBS HIGH 50: CPT | Mod: GC

## 2021-06-04 PROCEDURE — 99232 SBSQ HOSP IP/OBS MODERATE 35: CPT | Mod: GC

## 2021-06-04 RX ADMIN — Medication 1 TABLET(S): at 11:59

## 2021-06-04 RX ADMIN — TENOFOVIR DISOPROXIL FUMARATE 300 MILLIGRAM(S): 300 TABLET, FILM COATED ORAL at 11:59

## 2021-06-04 RX ADMIN — Medication 1 MILLIGRAM(S): at 11:59

## 2021-06-04 NOTE — PROGRESS NOTE ADULT - ATTENDING COMMENTS
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     # Liver Mass - HBV antigen positive, HBV viral load positive. MRI liver not consistent with HCC, but evidence of early cirrhosis. Started on tenofovir 300 daily. Discussed with hepatology who plans on reviewing imaging with abdominal radiology regarding next steps.    # ICH - left occipital hemorrhage with vasogenic edema in the left parietal-occipital region stable on repeat CT. Hemorrhagic lesion from metastasis remains high on differential. Will need MRI brain/orbits with anesthesia (severe claustrophobia).   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 INR improving after second dose Vit K.   # Pulmonary Nodules - scattered subcentimeter lung nodules    Discussed with patient and HS5

## 2021-06-04 NOTE — PROGRESS NOTE ADULT - SUBJECTIVE AND OBJECTIVE BOX
Neurology Progress Note    S: Patient seen and examined. No new events overnight. patient denied CP, SOB, HA or pain. no change.     Medication:  MEDICATIONS  (STANDING):  folic acid 1 milliGRAM(s) Oral daily  multivitamin 1 Tablet(s) Oral daily  tenofovir disoproxil fumarate (VIREAD) 300 milliGRAM(s) Oral daily    MEDICATIONS  (PRN):      Vitals:  Vital Signs Last 24 Hrs  Vital Signs Last 24 Hrs  T(C): 37 (04 Jun 2021 09:15), Max: 37 (04 Jun 2021 09:15)  T(F): 98.6 (04 Jun 2021 09:15), Max: 98.6 (04 Jun 2021 09:15)  HR: 77 (04 Jun 2021 09:15) (77 - 82)  BP: 124/60 (04 Jun 2021 09:15) (110/60 - 128/65)  BP(mean): --  RR: 18 (04 Jun 2021 09:15) (18 - 18)  SpO2: 97% (04 Jun 2021 09:15) (97% - 100%)    General Exam:   General Appearance: Appropriately dressed and in no acute distress       Head: Normocephalic, atraumatic and no dysmorphic features  Ear, Nose, and Throat: Moist mucous membranes  CVS: S1S2+  Resp: No SOB, no wheeze or rhonchi  Abd: soft NTND  Extremities: No edema, no cyanosis  Skin: No bruises, no rashes     Neurological Exam:  Mental Status: Awake, alert and oriented x 3.  Able to follow simple and complex verbal commands. Able to name and repeat. fluent speech. No obvious aphasia or dysarthria noted.   Cranial Nerves: PERRL, EOMI, RHHA improving  , sensation V1-V3 intact, R facial asymmetry , equal elevation of palate, scm/trap 5/5, tongue is midline on protrusion. no obvious papilledema on fundoscopic exam. Hearing is grossly intact.   Motor: Normal bulk, tone and strength throughout. Fine finger movements were intact and symmetric. no tremors or drift noted.    Sensation: Intact to light touch and pinprick throughout. no right/left confusion. no extinction to tactile on DSS.  Reflexes: 1+ throughout at biceps, brachioradialis, triceps, patellars and ankles bilaterally and equal. No clonus. R toe and L toe were both downgoing.  Coordination: No dysmetria on FNF   Gait: deferred     I personally reviewed the below data/images/labs:    CBC Full  -  ( 04 Jun 2021 07:14 )  WBC Count : 5.29 K/uL  RBC Count : 3.66 M/uL  Hemoglobin : 11.9 g/dL  Hematocrit : 36.2 %  Platelet Count - Automated : 116 K/uL  Mean Cell Volume : 98.9 fL  Mean Cell Hemoglobin : 32.5 pg  Mean Cell Hemoglobin Concentration : 32.9 gm/dL  Auto Neutrophil # : x  Auto Lymphocyte # : x  Auto Monocyte # : x  Auto Eosinophil # : x  Auto Basophil # : x  Auto Neutrophil % : x  Auto Lymphocyte % : x  Auto Monocyte % : x  Auto Eosinophil % : x  Auto Basophil % : x    06-04    137  |  106  |  6<L>  ----------------------------<  111<H>  3.7   |  21<L>  |  0.59    Ca    8.4      04 Jun 2021 07:14  Phos  3.6     06-04  Mg     1.7     06-04    TPro  6.6  /  Alb  2.8<L>  /  TBili  2.5<H>  /  DBili  x   /  AST  65<H>  /  ALT  30  /  AlkPhos  101  06-04          CT head w/o contrast (20:00): IMPRESSION:   2.4 x 3.0 x 2.4 cm left occipital acute intraparenchymal hemorrhage with associated vasogenic edema and mass effect as described. There is no midline shift.    Repeat CT head w/o contrast (2357):   CT brain: Unchanged left occipital acute intraparenchymal hemorrhage. No active bleeding.    CT angiography neck: No hemodynamically significant stenosis by NASCET criteria. No vascular dissection.    CT angiography brain: No major vessel occlusion or proximal stenosis. No aneurysm or vascular malformation.  < from: CT Head No Cont (05.29.21 @ 09:23) >    EXAM:  CT BRAIN        PROCEDURE DATE:  May 29 2021         INTERPRETATION:  INDICATION:  Left occipital hemorrhage. Follow-up.  TECHNIQUE:  A non contrast 2.5 or 3 mm axial CT study of the brain was performed from skull base to vertex. Coronal andsagittal reformations were generated from the axial data.  COMPARISON EXAMINATION:  CT dated 5/28/2021.    FINDINGS:    HEMISPHERES:  A subacute hematoma is again identified in the left occipital region. This is unchanged in size without evidence of further hemorrhage. There is underlying white matter edema in the left the parietal and occipital region with an appearance suggesting vasogenic edema. A hemorrhagic met, therefore, cannot be ruled out. Follow-up MR imaging with and without gadoliniumrecommended for further assessment along with further clinical correlation. There are no other areas of hemorrhage.  VENTRICLES:  Midline and normal in size.  POSTERIOR FOSSA:  The brain stem and cerebellum are unremarkable.  No CP angle lesion noted.  EXTRACEREBRAL SPACES:  No subdural or epidural collections are noted.  SKULL BASE AND CALVARIUM:  Appears intact.  No fracture or destructive lesion is identified.  SINUSES AND MASTOIDS:  Clear.  MISCELLANEOUS:  No orbital or suprasellar abnormality noted.    IMPRESSION:    1)  subacute left occipital hemorrhage with vasogenic edema in the left parietal-occipital region. A hemorrhagic metastasis is within the differential. Further workup and assessment recommended. Follow-up MR imaging with andwithout gadolinium may be considered for further evaluation.  2)  no significant midline shift shift or hydrocephalus.    No significant change when compared with prior CT.          MIKO CHARLES MD; Attending Radiologist  This document has been electronically signed. May 29 2021  9:33AM    < end of copied text >  < from: Transthoracic Echocardiogram (05.31.21 @ 09:01) >    Patient name: MENDOZA, GRISELDA  YOB: 1955   Age: 65 (F)   MR#: [id number]  Study Date: 5/31/2021  Location: [address] [place] [address] StudySonographer: Jessica Elliott RDCS  Study quality: Technically good  Referring Physician: Cristino Han MD  Blood Pressure: 134/86 mmHg  Height: 154 cm  Weight: 59 kg  BSA: 1.6 m2  ------------------------------------------------------------------------  PROCEDURE: Transthoracic echocardiogram with 2-D, M-Mode  and complete spectral and color flow Doppler. Patient was  injected with 10 cc's of aerosolized saline.  Patient was injected with 10 cc's of aerosolized saline.  INDICATION: Cerebral infarction, unspecified (I63.9)  ------------------------------------------------------------------------  DIMENSIONS:  Dimensions:     Normal Values:  LA:     3.4 cm    2.0 - 4.0 cm  Ao:     2.4 cm    2.0 - 3.8 cm  SEPTUM: 0.7 cm    0.6 - 1.2 cm  PWT:    0.8 cm    0.6 - 1.1 cm  LVIDd:  4.3 cm    3.0 - 5.6 cm  LVIDs:  2.6 cm    1.8 - 4.0 cm  Derived Variables:  LVMI: 62 g/m2  RWT: 0.37  Fractional short: 40 %  Ejection Fraction (Teicholtz): 70 %  ------------------------------------------------------------------------  OBSERVATIONS:  Mitral Valve: Normal mitral valve. Mild-moderate mitral  regurgitation.  Aortic Root: Normal aortic root.  Aortic Valve: Normal trileaflet aortic valve.  Left Atrium: Normal left atrium.  LA volume index = 18  cc/m2.  Left Ventricle: Normal left ventricular systolic function.  No segmental wall motion abnormalities. Normal left  ventricular internal dimensions and wall thicknesses.  Normal left ventricular diastolic function.  Right Heart: Normal right atrium. Normal right ventricular  size and function. Normal tricuspid valve. Mild tricuspid  regurgitation. Normal pulmonic valve.  Pericardium/PleuraNormal pericardium with no pericardial  effusion.  Hemodynamic: Estimated right ventricular systolic pressure  equals 40 mm Hg, assuming right atrial pressure equals 10  mm Hg, consistent with mild pulmonary hypertension.  Agitated saline injection demonstrates no evidence of a  patent foramen ovale.  ------------------------------------------------------------------------  CONCLUSIONS:  1. Normal mitral valve. Mild-moderate mitral regurgitation.  2. Normal trileafletaortic valve.  3. Normal left ventricular internal dimensions and wall  thicknesses.  4. Normal left ventricular systolic function. No segmental  wall motion abnormalities.  5. Normal left ventricular diastolic function.  6. Normal right ventricular size and function.  7. Normal tricuspid valve. Mild tricuspid regurgitation.  8. Estimated pulmonary artery systolic pressure equals 40  mm Hg, assuming right atrial pressure equals 10  mm Hg,  consistent with mild pulmonary hypertension.  9. Agitatedsaline injection demonstrates no evidence of a  patent foramen ovale.  *** No previous Echo exam.  ------------------------------------------------------------------------  Confirmed on  5/31/2021 - 10:02:23 by Dalia Kimball MD  ------------------------------------------------------------------------    < end of copied text >  A1C with Estimated Average Glucose in AM (06.01.21 @ 07:28)   A1C with Estimated Average Glucose Result: 5.0:   LDL Cholesterol Calculated: 93 mg/dL (06.01.21 @ 07:24)     < from: MR Abdomen w/ IV Cont (06.03.21 @ 14:16) >    EXAM:  MR ABDOMEN IC        PROCEDURE DATE:  Navin  3 2021         INTERPRETATION:  CLINICAL INFORMATION: Evaluate liver mass on CT. Hepatitis B.    COMPARISON: CT abdomen pelvis 5/28/2021    CONTRAST/COMPLICATIONS:  IV Contrast: Gadavist  5.5 cc administered   2 cc discarded  Oral Contrast: NONE  Complications: None reported at time of study completion    PROCEDURE:  MRI of the abdomen was performed.  MRCP was performed.    FINDINGS:  Motion degraded study.    LOWER CHEST: Within normal limits.    LIVER: Segmental heterogeneous enhancement of the right hepatic lobe on the arterial and portal venous phases corresponding to the abnormality on CT. No corresponding T1 signal abnormality or restricted diffusion. No displacement of vascular structures.  BILE DUCTS: Normal caliber.  GALLBLADDER: Layering sludge.  SPLEEN: Within normal limits.  PANCREAS: Within normal limits.  ADRENALS: Within normal limits.  KIDNEYS/URETERS: Within normal limits.    VISUALIZED PORTIONS:  BOWEL: Within normal limits.  PERITONEUM: Trace perihepatic ascites.  VESSELS: Hepatic and portal veins are patent.  RETROPERITONEUM/LYMPH NODES: No lymphadenopathy.  ABDOMINAL WALL: Within normal limits.  BONES: Degenerative changes.    IMPRESSION:  Motion limited study.    Segmental heterogeneous enhancement in the right hepatic lobe corresponding to the findings on CT, may represent perfusional abnormality. Consider short interval follow-up imaging.            CRISTAL ROYAL MD; Resident Radiology  This document has been electronically signed.  GBABY DOZIER MD; Attending Radiologist  This document has been electronically signed. Navin  3 2021  5:37PM    < end of copied text >

## 2021-06-04 NOTE — PROGRESS NOTE ADULT - SUBJECTIVE AND OBJECTIVE BOX
*******************************************************  Pedro Navarro MD PGY-1  Internal Medicine  Availble on [employer] Teams  Pager [facility]) [phone] / (AUS) [phone]  *******************************************************  Patient is a 65y old  Female who presents with a chief complaint of Intraparenchymal Hemorrhage ICH (03 Jun 2021 13:14)      INTERVAL EVENTS (since last progress note)  - MRI abd performed - motion limited although not diagnostic for HCC.   - tenofovir started      SUBJECTIVE / OVERNIGHT EVENTS:  - No acute events overnight.   - Patient seen and evaluated at bedside.  - No complaints this morning.  - ROS: Denies fevers/chills, headache, SOB at rest, cough, chest pain, palpitations, abdominal pain, nausea/vomiting/diarrhea/constipation, melena/hematochezia, dysuria, and hematuria    ------------------------------------------------------------------------------------------------------------    MEDICATIONS  (STANDING):  folic acid 1 milliGRAM(s) Oral daily  multivitamin 1 Tablet(s) Oral daily  tenofovir disoproxil fumarate (VIREAD) 300 milliGRAM(s) Oral daily    MEDICATIONS  (PRN):      ------------------------------------------------------------------------------------------------------------    OBJECTIVE:    CAPILLARY BLOOD GLUCOSE        I&O's Summary    Daily     Daily   Weight (kg): 54 (05-29-21 @ 02:43)    PHYSICAL EXAM:  T(F): 98.2, Max: 98.5 (06-04-21 @ 01:15)  HR: 80 (76 - 82)  BP: 117/60 (110/60 - 128/65)  RR: 18 (17 - 18)  SpO2: 99% (97% - 100%)      CONSTITUTIONAL: NAD, well-developed  HEENT: NCAT, EOMI, no scleral icterus, MMM, Mallampati 3  RESPIRATORY/CHEST: Normal respiratory effort; lungs are clear to auscultation bilaterally; no wheezing/crackles  CARDIO: Regular rate, normal S1 and S2, +systolic 2/6 murmur. No rub/gallop; No JVD  VASCULAR: No lower extremity edema; Peripheral pulses are 2+ bilaterally; Capillary refill brisk  ABDOMEN: Soft, nontender to palpation, normoactive bowel sounds, no rebound/guarding; No hepatosplenomegaly  MUSCULOSKELETAL: no joint swelling or tenderness to palpation, full strength all extremities.  EXTREMITIES: hands/feet are warm, and without cyanosis or clubbing  SKIN: no visible rashes, pallor, diaphoresis, or jaundice  NEURO: No focal deficits, moving all extremities  PSYCH: A+O to person, place, and time; affect appropriate; cooperative    ------------------------------------------------------------------------------------------------------------  LABS:                        11.9   5.29  )-----------( 116      ( 04 Jun 2021 07:14 )             36.2     06-04    137  |  106  |  6<L>  ----------------------------<  111<H>  3.7   |  21<L>  |  0.59    Ca    8.4      04 Jun 2021 07:14  Phos  3.6     06-04  Mg     1.7     06-04    TPro  6.6  /  Alb  2.8<L>  /  TBili  2.5<H>  /  DBili  x   /  AST  65<H>  /  ALT  30  /  AlkPhos  101  06-04    PT/INR - ( 04 Jun 2021 07:14 )   PT: 19.3 sec;   INR: 1.72 ratio         PTT - ( 03 Jun 2021 07:05 )  PTT:38.8 sec            RADIOLOGY & ADDITIONAL TESTS:  Results Reviewed:     Imaging Personally Reviewed:    < from: MR Abdomen w/ IV Cont (06.03.21 @ 14:16) >    Segmental heterogeneous enhancement in the right hepatic lobe corresponding to the findings on CT, may represent perfusional abnormality. Consider short interval follow-up imaging.        < end of copied text >  Electrocardiogram Personally Reviewed:      COORDINATION OF CARE:  Care discussed with consultants/other providers and notes reviewed [Y]:   ------------------------------------------------------------------------------------------------------------

## 2021-06-04 NOTE — PROGRESS NOTE ADULT - ASSESSMENT
64yo F with no known PMH who presented to the ED with headache and vision changes. Hepatology consulted for liver lesion and HBV.    # Infiltrative liver lesion - concerning for malignancy, likely HCC in the setting of chronic HBV. MRI limited by motion.  # Elevated liver tests - mostly bilirubin elevation, potentially in the settings of an infiltrative disease vs. worsening hepatic dysfunction in the settings of chronic HBV/EtOH use (c/w elevated INR and bilirubin). No cirrhosis on imaging or evidence of portal hypertension.  # HBV - Chronic HBV. Possibly contracted from  (who had a liver disease). No clear exposure. HBVDNA positive with underlying cirrhosis. Started on Tenofovir indefinitely  # ICH - possibly in the settings of coagulopathy, stable    Recommendations:  - Consider CT triple phase with IV contrast  - c/w tenofovir  - delta agent pending  - Daily CMP, INR, CBC  - Thiamine, folate, MVI    Thank you for involving us in the care of this patient. Please reach out if any further questions.    Jabari Lara, PGY-4  Hepatology Fellow    Available on Microsoft Teams  Pager [phone] ([facility]) or [phone] ([facility])  After 5PM/Weekends, please contact the on-call GI fellow: [phone]  Available through Microsoft Teams 64yo F with no known PMH who presented to the ED with headache and vision changes. Hepatology consulted for liver lesion and HBV.    # Infiltrative liver lesion - concerning for malignancy, likely HCC in the setting of chronic HBV. MRI limited by motion.  # Elevated liver tests - mostly bilirubin elevation, potentially in the settings of an infiltrative disease vs. worsening hepatic dysfunction in the settings of chronic HBV/EtOH use (c/w elevated INR and bilirubin). No cirrhosis on imaging or evidence of portal hypertension.  # HBV - Chronic HBV. Possibly contracted from  (who had a liver disease). No clear exposure. HBVDNA positive with underlying cirrhosis. Started on Tenofovir indefinitely  # ICH - possibly in the settings of coagulopathy, stable    Recommendations:  - Repeat MRI in 3-6 months  - c/w tenofovir indefinitely  - Outpatient followup with hepatology in 6 months  - delta agent pending  - Daily CMP, INR, CBC  - Thiamine, folate, MVI    Thank you for involving us in the care of this patient. Please reach out if any further questions.    Jabari Lara, PGY-4  Hepatology Fellow    Available on Microsoft Teams  Pager [phone] ([facility]) or [phone] ([facility])  After 5PM/Weekends, please contact the on-call GI fellow: [phone]  Available through [employer] Teams

## 2021-06-04 NOTE — PROGRESS NOTE ADULT - ATTENDING COMMENTS
Patient seen and examined with the liver team. I agree with the plan as above.  HBV with presumed cirrhosis and detectable HBVDNA who was started on TDF yesterday. I spoke with her at length and explained the rationale for TDF. I explained that treatment will likely be lifelong. I discussed the risk of disease progression. MRI from yesterday without lesions. She will need repeat labs, AFP and MRI in 6 months

## 2021-06-04 NOTE — PROGRESS NOTE ADULT - ASSESSMENT
66 yo RH AAF p/w HA and vision changes.  found to have L occipital IPH. on exam with R HHA and mild R facial  repeat CTH stable 5/29. also found to have liver mass. INR was ~2 on arrival s/p vit K. now on keppra. CTA H/N neg, TTE as above, A1c 5%, LDL 93mg/dL.   Etiology of IPH may be 2/2 HTN, mets, CAA, coagulaopathy from liver disease, needs further workup. INR slowly rising   - vitK rising INR   - MRI brain w/ and w/o pending   - MRI orbits  - no role for steroids from neurostandpoint  unless mass found intracranially   - SBP<160  - on keppra 500mg BID but no seizures noted. no need for AED unless has seizure  - rEEG can be outpt.   - cancer workup given pulmonary nodules and liver mass.  hepatology following.   - PT/OT  - hepatology recs appreciated, HCC workup in progress, chronic hep B?  - tele   - check FS, glucose control <180  - GI/DVT ppx  - Counseling on diet, exercise, and medication adherence was done  - Counseling on smoking cessation and alcohol consumption offered when appropriate.  - Pain assessed and judicious use of narcotics when appropriate was discussed.    - Stroke education given when appropriate.  - Importance of fall prevention discussed.   - Differential diagnosis and plan of care discussed with patient and/or family and primary team  - Thank you for allowing me to participate in the care of this patient. Call with questions.   Suleman Valadez MD  Vascular Neurology .

## 2021-06-04 NOTE — PROGRESS NOTE ADULT - PROBLEM SELECTOR PLAN 3
hepatitis panel with + HBsAg, -HBsAb, -HBcAb IgM, +HBcAb IgG, -HBeAg, +HBeAb = convalescent chronic HBV  -viral load positive  -starting tenofovir  -f/u delta agent  -appreciate hepatology recs  -HIV negative  -AFP negative

## 2021-06-04 NOTE — PROGRESS NOTE ADULT - PROBLEM SELECTOR PLAN 1
Patient with 2.4 x 3.0 x 2.4 cm left occipital acute intraparenchymal hemorrhage with associated vasogenic edema and mass effect, no midline shift. CTA head/neck 5 hrs later with unchanged left occipital acute intraparenchymal hemorrhage. No active bleeding. Imaging and exam stable. Etiology at this stage is unknown. DDx includes metastatic malignancy, hemorrhagic stroke, amyloid angiopathy, severe coagulopathy c/b spontaneous ICH.  - INR improved to 1.72 after PO vit K yesterday  - platelets goal >100 (currently 116)  - Neuro following, will determine the need for steroid after MRI brain and orbits  - MRI brain and orbits wwo C for stroke workup and rule out underlying lesion - will require anesthesia given patient's claustrophobia  - rEEG after MRI

## 2021-06-04 NOTE — PROGRESS NOTE ADULT - PROBLEM SELECTOR PLAN 4
s/p CIWA: patient denied heavy ETOH use, but daughter reports patient is drinking heavily  LEON [email or web address]  c/w thiamine, folic acid, MV s/p CIWA: patient denied heavy ETOH use, but daughter reports patient is drinking heavily  CIWA dced  c/w thiamine, folic acid, MV

## 2021-06-04 NOTE — PROGRESS NOTE ADULT - ASSESSMENT
65F with no known PMH who presented to the ED with headache and vision changes, found to have an intraparenchymal hemorrhage on CTH and indeterminate infiltrative liver mass concerning for metastatic neoplasm, s/p MICU stay for q1 hour neuro checks. 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 MR abdomen not diagnostic for HCC.

## 2021-06-04 NOTE — PROGRESS NOTE ADULT - PROBLEM SELECTOR PLAN 2
CTAP with Indeterminate infiltrative liver mass; concerning for malignancy  - h/o alcohol use, no signs of withdrawal currently, can d/c CIWA  - CT revealed pulmonary nodules, liver mass, and possible hemorrhagic brain mets; Will need malignancy workup  - MR showing heterogeneous enhancement in R hepatic lobe concerning for diffusion limitation -not diagnostic for HCC. Will discuss ddx with radiology later today  - IR to assess for liver biopsy based on MRI  - acute hepatitis panel concerning for HBV (see below)    Malignancy Screening:  Hgb 13.6 on admission  Cancer hx: no personal hx of cancers; 2 sisters passed with gastric cancer, daughter has breast cancer  Cancer screening:  Mammo and US wnl last year, but has had multiple workup/biopsies in the past for abnormal findings on mammo (all benign findings);   Last colonoscopy 3 years ago, normal finding per report  Never had EGD  PAP smear last year, reportedly normal

## 2021-06-04 NOTE — PROGRESS NOTE ADULT - SUBJECTIVE AND OBJECTIVE BOX
Interval Events:   - MRI done, limited by motion  - HBV DNA noted to be positive, started on tenofovir  - Doing well this AM    Allergies:  No Known Allergies      Hospital Medications:  folic acid 1 milliGRAM(s) Oral daily  multivitamin 1 Tablet(s) Oral daily  tenofovir disoproxil fumarate (VIREAD) 300 milliGRAM(s) Oral daily      PMHX/PSHX:  No pertinent past medical history    No significant past surgical history        Family history:  No pertinent family history in first degree relatives        ROS:   General:  No wt loss, fevers, chills, night sweats, fatigue,   Eyes:  Good vision, no reported pain  ENT:  No sore throat, pain, runny nose, dysphagia  CV:  No pain, palpitations, hypo/hypertension  Pulm:  No dyspnea, cough, tachypnea, wheezing  GI:  As per HPI  :  No pain, bleeding, incontinence, nocturia  Muscle:  No pain, weakness  Neuro:  No weakness, tingling, memory problems  Psych:  No fatigue, insomnia, mood problems, depression  Endocrine:  No polyuria, polydipsia, cold/heat intolerance  Heme:  No petechiae, ecchymosis, easy bruisability  Skin:  No rash, tattoos, scars, edema      PHYSICAL EXAM:   Vital Signs:  Vital Signs Last 24 Hrs  T(C): 37 (04 Jun 2021 09:15), Max: 37 (04 Jun 2021 09:15)  T(F): 98.6 (04 Jun 2021 09:15), Max: 98.6 (04 Jun 2021 09:15)  HR: 77 (04 Jun 2021 09:15) (76 - 82)  BP: 124/60 (04 Jun 2021 09:15) (110/60 - 128/65)  BP(mean): --  RR: 18 (04 Jun 2021 09:15) (18 - 18)  SpO2: 97% (04 Jun 2021 09:15) (97% - 100%)  Daily     Daily       GENERAL:  No acute distress  HEENT:  Normocephalic/atraumatic, +scleral icterus  CHEST:  No accessory muscle use  HEART:  Regular rate and rhythm  ABDOMEN:  Soft, non-tender, distended  EXTREMITIES: No cyanosis, clubbing, or edema  SKIN:  No rash  NEURO:  Alert and oriented x 3, no asterixis    LABS:                        11.9   5.29  )-----------( 116      ( 04 Jun 2021 07:14 )             36.2     Mean Cell Volume: 98.9 fL (06-04-21 @ 07:14)    06-04    137  |  106  |  6<L>  ----------------------------<  111<H>  3.7   |  21<L>  |  0.59    Ca    8.4      04 Jun 2021 07:14  Phos  3.6     06-04  Mg     1.7     06-04    TPro  6.6  /  Alb  2.8<L>  /  TBili  2.5<H>  /  DBili  x   /  AST  65<H>  /  ALT  30  /  AlkPhos  101  06-04    LIVER FUNCTIONS - ( 04 Jun 2021 07:14 )  Alb: 2.8 g/dL / Pro: 6.6 g/dL / ALK PHOS: 101 U/L / ALT: 30 U/L / AST: 65 U/L / GGT: x           PT/INR - ( 04 Jun 2021 07:14 )   PT: 19.3 sec;   INR: 1.72 ratio         PTT - ( 03 Jun 2021 07:05 )  PTT:38.8 sec                            11.9   5.29  )-----------( 116      ( 04 Jun 2021 07:14 )             36.2                         11.9   5.64  )-----------( 109      ( 03 Jun 2021 07:05 )             35.4                         12.0   6.30  )-----------( 120      ( 02 Jun 2021 03:49 )             36.0

## 2021-06-05 LAB
ALBUMIN SERPL ELPH-MCNC: 2.9 G/DL — LOW (ref 3.3–5)
ALP SERPL-CCNC: 107 U/L — SIGNIFICANT CHANGE UP (ref 40–120)
ALT FLD-CCNC: 31 U/L — SIGNIFICANT CHANGE UP (ref 4–33)
ANION GAP SERPL CALC-SCNC: 13 MMOL/L — SIGNIFICANT CHANGE UP (ref 7–14)
AST SERPL-CCNC: 67 U/L — HIGH (ref 4–32)
BILIRUB SERPL-MCNC: 2.5 MG/DL — HIGH (ref 0.2–1.2)
BUN SERPL-MCNC: 6 MG/DL — LOW (ref 7–23)
CALCIUM SERPL-MCNC: 8.6 MG/DL — SIGNIFICANT CHANGE UP (ref 8.4–10.5)
CHLORIDE SERPL-SCNC: 106 MMOL/L — SIGNIFICANT CHANGE UP (ref 98–107)
CO2 SERPL-SCNC: 20 MMOL/L — LOW (ref 22–31)
CREAT SERPL-MCNC: 0.55 MG/DL — SIGNIFICANT CHANGE UP (ref 0.5–1.3)
GLUCOSE SERPL-MCNC: 110 MG/DL — HIGH (ref 70–99)
HCT VFR BLD CALC: 35.4 % — SIGNIFICANT CHANGE UP (ref 34.5–45)
HCT VFR BLD CALC: 37.2 % — SIGNIFICANT CHANGE UP (ref 34.5–45)
HGB BLD-MCNC: 11.8 G/DL — SIGNIFICANT CHANGE UP (ref 11.5–15.5)
HGB BLD-MCNC: 12.5 G/DL — SIGNIFICANT CHANGE UP (ref 11.5–15.5)
INR BLD: 1.8 RATIO — HIGH (ref 0.88–1.16)
MAGNESIUM SERPL-MCNC: 1.9 MG/DL — SIGNIFICANT CHANGE UP (ref 1.6–2.6)
MCHC RBC-ENTMCNC: 32.8 PG — SIGNIFICANT CHANGE UP (ref 27–34)
MCHC RBC-ENTMCNC: 33.1 PG — SIGNIFICANT CHANGE UP (ref 27–34)
MCHC RBC-ENTMCNC: 33.3 GM/DL — SIGNIFICANT CHANGE UP (ref 32–36)
MCHC RBC-ENTMCNC: 33.6 GM/DL — SIGNIFICANT CHANGE UP (ref 32–36)
MCV RBC AUTO: 97.6 FL — SIGNIFICANT CHANGE UP (ref 80–100)
MCV RBC AUTO: 99.4 FL — SIGNIFICANT CHANGE UP (ref 80–100)
NRBC # BLD: 0 /100 WBCS — SIGNIFICANT CHANGE UP
NRBC # BLD: 0 /100 WBCS — SIGNIFICANT CHANGE UP
NRBC # FLD: 0 K/UL — SIGNIFICANT CHANGE UP
NRBC # FLD: 0 K/UL — SIGNIFICANT CHANGE UP
PHOSPHATE SERPL-MCNC: 3.3 MG/DL — SIGNIFICANT CHANGE UP (ref 2.5–4.5)
PLATELET # BLD AUTO: 100 K/UL — LOW (ref 150–400)
PLATELET # BLD AUTO: 103 K/UL — LOW (ref 150–400)
POTASSIUM SERPL-MCNC: 3.6 MMOL/L — SIGNIFICANT CHANGE UP (ref 3.5–5.3)
POTASSIUM SERPL-SCNC: 3.6 MMOL/L — SIGNIFICANT CHANGE UP (ref 3.5–5.3)
PROT SERPL-MCNC: 6.8 G/DL — SIGNIFICANT CHANGE UP (ref 6–8.3)
PROTHROM AB SERPL-ACNC: 20.1 SEC — HIGH (ref 10.6–13.6)
RBC # BLD: 3.56 M/UL — LOW (ref 3.8–5.2)
RBC # BLD: 3.81 M/UL — SIGNIFICANT CHANGE UP (ref 3.8–5.2)
RBC # FLD: 14.8 % — HIGH (ref 10.3–14.5)
RBC # FLD: 15 % — HIGH (ref 10.3–14.5)
SODIUM SERPL-SCNC: 139 MMOL/L — SIGNIFICANT CHANGE UP (ref 135–145)
WBC # BLD: 5.37 K/UL — SIGNIFICANT CHANGE UP (ref 3.8–10.5)
WBC # BLD: 6.22 K/UL — SIGNIFICANT CHANGE UP (ref 3.8–10.5)
WBC # FLD AUTO: 5.37 K/UL — SIGNIFICANT CHANGE UP (ref 3.8–10.5)
WBC # FLD AUTO: 6.22 K/UL — SIGNIFICANT CHANGE UP (ref 3.8–10.5)

## 2021-06-05 PROCEDURE — 99232 SBSQ HOSP IP/OBS MODERATE 35: CPT | Mod: GC

## 2021-06-05 RX ADMIN — Medication 1 TABLET(S): at 11:35

## 2021-06-05 RX ADMIN — TENOFOVIR DISOPROXIL FUMARATE 300 MILLIGRAM(S): 300 TABLET, FILM COATED ORAL at 11:35

## 2021-06-05 RX ADMIN — Medication 1 MILLIGRAM(S): at 11:35

## 2021-06-05 NOTE — PROGRESS NOTE ADULT - PROBLEM SELECTOR PLAN 2
CTAP with Indeterminate infiltrative liver mass; concerning for malignancy  - h/o alcohol use, no signs of withdrawal currently, can d/c CIWA  - CT revealed pulmonary nodules, liver mass, and possible hemorrhagic brain mets; Will need malignancy workup  - MR showing heterogeneous enhancement in R hepatic lobe concerning for diffusion limitation -not diagnostic for HCC. Will discuss ddx with radiology later today  - IR to assess for liver biopsy based on MRI  - acute hepatitis panel concerning for HBV (see below)    Malignancy Screening:  Hgb 13.6 on admission  Cancer hx: no personal hx of cancers; 2 sisters passed with gastric cancer, daughter has breast cancer  Cancer screening:  Mammo and US wnl last year, but has had multiple workup/biopsies in the past for abnormal findings on mammo (all benign findings);   Last colonoscopy 3 years ago, normal finding per report  Never had EGD  PAP smear last year, reportedly normal CTAP with Indeterminate infiltrative liver mass; concerning for malignancy  - h/o alcohol use, no signs of withdrawal currently, s/p CIWA  - CT revealed pulmonary nodules, liver mass, and possible hemorrhagic brain mets; Will need malignancy workup  - MR showing heterogeneous enhancement in R hepatic lobe concerning for diffusion limitation - not diagnostic for HCC  - Tentative IR consult for possible liver biopsy based on discussion regarding MR  - Acute hepatitis panel concerning for HBV (see below)    Malignancy Screening:  Hgb 13.6 on admission  Cancer hx: no personal hx of cancers; 2 sisters passed with gastric cancer, daughter has breast cancer  Cancer screening:  Mammo and US wnl last year, but has had multiple workup/biopsies in the past for abnormal findings on mammo (all benign findings);   Last colonoscopy 3 years ago, normal finding per report  Never had EGD  PAP smear last year, reportedly normal CTAP with Indeterminate infiltrative liver mass; concerning for malignancy  - h/o alcohol use, no signs of withdrawal currently, s/p CIWA  - CT revealed pulmonary nodules, liver mass, and possible hemorrhagic brain mets; Will need malignancy workup  - MR showing heterogeneous enhancement in R hepatic lobe concerning for diffusion limitation - not diagnostic for HCC  - Tentative IR consult for possible liver biopsy based on discussion regarding MR  - Acute hepatitis panel concerning for HBV (see below)    #Malignancy Screening:  Hgb 13.6 on admission  Cancer hx: no personal hx of cancers; 2 sisters passed with gastric cancer, daughter has breast cancer  Cancer screening:  Mammo and US wnl last year, but has had multiple workup/biopsies in the past for abnormal findings on mammo (all benign findings);   Last colonoscopy 3 years ago, normal finding per report  Never had EGD  PAP smear last year, reportedly normal

## 2021-06-05 NOTE — PROGRESS NOTE ADULT - SUBJECTIVE AND OBJECTIVE BOX
Contact Information:  Zenaida Benson II, MD, MPH  PGY-2, Internal Medicine  Pager: [phone] ([facility]) /// [phone] ([facility])    MENDOZA, GRISELDA, MRN-[id number]    Patient is a 65y old  Female who presents with a chief complaint of Intraparenchymal Hemorrhage ICH (04 Jun 2021 11:35)      OVERNIGHT EVENTS/INTERVAL/SUBJECTIVE:    CONSTITUTIONAL: No weakness. No fatigue. No fever.  HEAD: No head trauma.   EYES: No vision changes.  ENT: No hearing changes or tinnitus. No ear pain. No changes in smell. No nasal congestion or discharge. No sore throat. No voice hoarseness.   NECK: No neck pain or stiffness. No lumps.  RESPIRATORY: No cough. No SOB. No wheezing. No hemoptysis.   CARDIOVASCULAR: No chest pain. No palpitations.   GASTROINTESTINAL: No dysphagia. No ABD pain. No distension. No constipation. No diarrhea. No pain with defecation. No hematemesis. No hematochezia or melena.  BACK: No back pain.  GENITOURINARY: No dysuria. No frequency or urgency. No hesitancy. No incontinence. No urinary retention. No suprapubic pain. No hematuria.  EXTREMITY: No swelling.  MUSCULOSKELETAL: No joint pain or swelling. No fractures. No stiffness.    SKIN: No rashes. No itching. No skin, hair, or nail changes.  NEUROLOGICAL: No weakness or paralysis. No lightheadedness or dizziness. No HA. No numbness or tingling.   PSYCHIATRIC: No depression.       OBJECTIVE:  Vital Signs Last 24 Hrs  T(C): 36.9 (05 Jun 2021 05:49), Max: 37.2 (04 Jun 2021 22:19)  T(F): 98.4 (05 Jun 2021 05:49), Max: 98.9 (04 Jun 2021 22:19)  HR: 76 (05 Jun 2021 05:49) (76 - 86)  BP: 107/62 (05 Jun 2021 05:49) (107/62 - 128/67)  BP(mean): --  RR: 18 (05 Jun 2021 05:49) (18 - 18)  SpO2: 98% (05 Jun 2021 05:49) (97% - 98%)  I&O's Summary      MEDICATIONS  (STANDING):  folic acid 1 milliGRAM(s) Oral daily  multivitamin 1 Tablet(s) Oral daily  tenofovir disoproxil fumarate (VIREAD) 300 milliGRAM(s) Oral daily    MEDICATIONS  (PRN):    Allergies    No Known Allergies    Intolerances        CONSTITUTIONAL: No acute distress. Awake and alert.  HEAD: No evidence of trauma. Structures WNL.  EYES: +PERRL. +EOMI. No scleral icterus. No conjunctival injection.  ENT: Moist oral mucosa. No erythema. No pharyngeal exudates.   NECK: Supple. Appropriate ROM. No stiffness. No masses or lymphadenopathy.  RESPIRATORY: CTAB. No wheezes, rales, or rhonchi. No accessory muscle use. No apparent respiratory distress.  CARDIOVASCULAR: +S1/S2. No audible S3/S4. Regular rate and rhythm. No murmurs, rubs, or gallops. 2+ radial pulses x b/l UE; 2+ DP pulses x b/l LE.   GASTROINTESTINAL: Soft, nontender, nondistended. +BS. No rebound or guarding.   BACK: No spinal or paraspinal tenderness. No CVA tenderness.  EXTREMITY: No LE swelling or edema. EXTs warm to touch.  MUSCULOSKELETAL: Spontaneous movement in all extremities.  DERMATOLOGICAL: No abnormal rashes or lesions.  NEUROLOGICAL: CN 2-12 grossly intact. No focal deficits. Sensation intact x 4EXT. A&Ox3 (oriented to person, place, and time).  PSYCHIATRIC: Appropriate affect.                            11.9   5.29  )-----------( 116      ( 04 Jun 2021 07:14 )             36.2     PT/INR - ( 04 Jun 2021 07:14 )   PT: 19.3 sec;   INR: 1.72 ratio         PTT - ( 03 Jun 2021 07:05 )  PTT:38.8 sec  06-04    137  |  106  |  6<L>  ----------------------------<  111<H>  3.7   |  21<L>  |  0.59    Ca    8.4      04 Jun 2021 07:14  Phos  3.6     06-04  Mg     1.7     06-04    TPro  6.6  /  Alb  2.8<L>  /  TBili  2.5<H>  /  DBili  x   /  AST  65<H>  /  ALT  30  /  AlkPhos  101  06-04    CAPILLARY BLOOD GLUCOSE        LIVER FUNCTIONS - ( 04 Jun 2021 07:14 )  Alb: 2.8 g/dL / Pro: 6.6 g/dL / ALK PHOS: 101 U/L / ALT: 30 U/L / AST: 65 U/L / GGT: x                       RADIOLOGY AND ADDITIONAL TESTS:    CONSULTANT NOTES REVIEWED:    CARE DISCUSSED WITH THE FOLLOWING CONSULTANTS/PROVIDERS: Contact Information:  Zenaida Benson II, MD, MPH  PGY-2, Internal Medicine  Pager: [phone] ([facility]) /// [phone] ([facility])    MENDOZA, GRISELDA, MRN-[id number]    Patient is a 65y old  Female who presents with a chief complaint of Intraparenchymal Hemorrhage ICH (04 Jun 2021 11:35)      OVERNIGHT EVENTS/INTERVAL/SUBJECTIVE: Patient evaluated at bedside.    CONSTITUTIONAL: No weakness. No fatigue. No fever.  HEAD: No head trauma.   EYES: No vision changes.  ENT: No hearing changes or tinnitus. No ear pain. No changes in smell. No nasal congestion or discharge. No sore throat. No voice hoarseness.   NECK: No neck pain or stiffness. No lumps.  RESPIRATORY: No cough. No SOB. No wheezing. No hemoptysis.   CARDIOVASCULAR: No chest pain. No palpitations.   GASTROINTESTINAL: No dysphagia. No ABD pain. No distension. No constipation. No diarrhea. No pain with defecation. No hematemesis. No hematochezia or melena.  BACK: No back pain.  GENITOURINARY: No dysuria. No frequency or urgency. No hesitancy. No incontinence. No urinary retention. No suprapubic pain. No hematuria.  EXTREMITY: No swelling.  MUSCULOSKELETAL: No joint pain or swelling. No fractures. No stiffness.    SKIN: No rashes. No itching. No skin, hair, or nail changes.  NEUROLOGICAL: No weakness or paralysis. No lightheadedness or dizziness. No HA. No numbness or tingling.   PSYCHIATRIC: No depression.       OBJECTIVE:  Vital Signs Last 24 Hrs  T(C): 36.9 (05 Jun 2021 05:49), Max: 37.2 (04 Jun 2021 22:19)  T(F): 98.4 (05 Jun 2021 05:49), Max: 98.9 (04 Jun 2021 22:19)  HR: 76 (05 Jun 2021 05:49) (76 - 86)  BP: 107/62 (05 Jun 2021 05:49) (107/62 - 128/67)  BP(mean): --  RR: 18 (05 Jun 2021 05:49) (18 - 18)  SpO2: 98% (05 Jun 2021 05:49) (97% - 98%)  I&O's Summary      MEDICATIONS  (STANDING):  folic acid 1 milliGRAM(s) Oral daily  multivitamin 1 Tablet(s) Oral daily  tenofovir disoproxil fumarate (VIREAD) 300 milliGRAM(s) Oral daily    MEDICATIONS  (PRN):    Allergies    No Known Allergies    Intolerances        CONSTITUTIONAL: No acute distress. Awake and alert.  HEAD: No evidence of trauma. Structures WNL.  EYES: +PERRL. +EOMI. No scleral icterus. No conjunctival injection.  ENT: Moist oral mucosa. No erythema. No pharyngeal exudates.   NECK: Supple. Appropriate ROM. No stiffness. No masses or lymphadenopathy.  RESPIRATORY: CTAB. No wheezes, rales, or rhonchi. No accessory muscle use. No apparent respiratory distress.  CARDIOVASCULAR: +S1/S2. No audible S3/S4. Regular rate and rhythm. No murmurs, rubs, or gallops. 2+ radial pulses x b/l UE; 2+ DP pulses x b/l LE.   GASTROINTESTINAL: Soft, nontender, nondistended. +BS. No rebound or guarding.   BACK: No spinal or paraspinal tenderness. No CVA tenderness.  EXTREMITY: No LE swelling or edema. EXTs warm to touch.  MUSCULOSKELETAL: Spontaneous movement in all extremities.  DERMATOLOGICAL: No abnormal rashes or lesions.  NEUROLOGICAL: CN 2-12 grossly intact. No focal deficits. Sensation intact x 4EXT. A&Ox3 (oriented to person, place, and time).  PSYCHIATRIC: Appropriate affect.                            11.9   5.29  )-----------( 116      ( 04 Jun 2021 07:14 )             36.2     PT/INR - ( 04 Jun 2021 07:14 )   PT: 19.3 sec;   INR: 1.72 ratio         PTT - ( 03 Jun 2021 07:05 )  PTT:38.8 sec  06-04    137  |  106  |  6<L>  ----------------------------<  111<H>  3.7   |  21<L>  |  0.59    Ca    8.4      04 Jun 2021 07:14  Phos  3.6     06-04  Mg     1.7     06-04    TPro  6.6  /  Alb  2.8<L>  /  TBili  2.5<H>  /  DBili  x   /  AST  65<H>  /  ALT  30  /  AlkPhos  101  06-04    CAPILLARY BLOOD GLUCOSE        LIVER FUNCTIONS - ( 04 Jun 2021 07:14 )  Alb: 2.8 g/dL / Pro: 6.6 g/dL / ALK PHOS: 101 U/L / ALT: 30 U/L / AST: 65 U/L / GGT: x                       RADIOLOGY AND ADDITIONAL TESTS:    CONSULTANT NOTES REVIEWED:    CARE DISCUSSED WITH THE FOLLOWING CONSULTANTS/PROVIDERS: Contact Information:  Zenaida Benson II, MD, MPH  PGY-2, Internal Medicine  Pager: [phone] ([facility]) /// [phone] ([facility])    MENDOZA, GRISELDA, MRN-[id number]    Patient is a 65y old  Female who presents with a chief complaint of Intraparenchymal Hemorrhage ICH (04 Jun 2021 11:35)      OVERNIGHT EVENTS/INTERVAL/SUBJECTIVE: Patient evaluated at bedside. She denies any acute complaints, including headache, nausea/vomiting, lightheadedness, dizziness, SOB, cough, ABD pain, CP, numbness and tingling.    CONSTITUTIONAL: No weakness. No fatigue. No fever.  HEAD: No head trauma.   EYES: No vision changes.  ENT: No hearing changes or tinnitus. No ear pain. No changes in smell. No nasal congestion or discharge. No sore throat. No voice hoarseness.   NECK: No neck pain or stiffness. No lumps.  RESPIRATORY: No cough. No SOB. No wheezing. No hemoptysis.   CARDIOVASCULAR: No chest pain. No palpitations.   GASTROINTESTINAL: No dysphagia. No ABD pain. No distension. No constipation. No diarrhea. No pain with defecation. No hematemesis. No hematochezia or melena.  BACK: No back pain.  GENITOURINARY: No dysuria. No frequency or urgency. No hesitancy. No incontinence. No urinary retention. No suprapubic pain. No hematuria.  EXTREMITY: No swelling.  MUSCULOSKELETAL: No joint pain or swelling. No fractures. No stiffness.    SKIN: No rashes. No itching. No skin, hair, or nail changes.  NEUROLOGICAL: No weakness or paralysis. No lightheadedness or dizziness. No HA. No numbness or tingling.   PSYCHIATRIC: No depression.       OBJECTIVE:  Vital Signs Last 24 Hrs  T(C): 36.9 (05 Jun 2021 05:49), Max: 37.2 (04 Jun 2021 22:19)  T(F): 98.4 (05 Jun 2021 05:49), Max: 98.9 (04 Jun 2021 22:19)  HR: 76 (05 Jun 2021 05:49) (76 - 86)  BP: 107/62 (05 Jun 2021 05:49) (107/62 - 128/67)  BP(mean): --  RR: 18 (05 Jun 2021 05:49) (18 - 18)  SpO2: 98% (05 Jun 2021 05:49) (97% - 98%)  I&O's Summary      MEDICATIONS  (STANDING):  folic acid 1 milliGRAM(s) Oral daily  multivitamin 1 Tablet(s) Oral daily  tenofovir disoproxil fumarate (VIREAD) 300 milliGRAM(s) Oral daily    MEDICATIONS  (PRN):    Allergies    No Known Allergies    Intolerances        CONSTITUTIONAL: No acute distress. Awake and alert.  RESPIRATORY: CTAB. No wheezes, rales, or rhonchi. No accessory muscle use. No apparent respiratory distress.  CARDIOVASCULAR: +S1/S2. No audible S3/S4. Regular rate and rhythm. No murmurs, rubs, or gallops.    GASTROINTESTINAL: Soft, nontender, nondistended. +BS. No rebound or guarding.   EXTREMITY: No LE swelling or edema. EXTs warm to touch.  MUSCULOSKELETAL: Spontaneous movement in all extremities.  DERMATOLOGICAL: No abnormal rashes or lesions.  NEUROLOGICAL: CN 2-12 grossly intact. No focal deficits. A&Ox3 (oriented to person, place, and time).                            11.9   5.29  )-----------( 116      ( 04 Jun 2021 07:14 )             36.2     PT/INR - ( 04 Jun 2021 07:14 )   PT: 19.3 sec;   INR: 1.72 ratio         PTT - ( 03 Jun 2021 07:05 )  PTT:38.8 sec  06-04    137  |  106  |  6<L>  ----------------------------<  111<H>  3.7   |  21<L>  |  0.59    Ca    8.4      04 Jun 2021 07:14  Phos  3.6     06-04  Mg     1.7     06-04    TPro  6.6  /  Alb  2.8<L>  /  TBili  2.5<H>  /  DBili  x   /  AST  65<H>  /  ALT  30  /  AlkPhos  101  06-04    CAPILLARY BLOOD GLUCOSE        LIVER FUNCTIONS - ( 04 Jun 2021 07:14 )  Alb: 2.8 g/dL / Pro: 6.6 g/dL / ALK PHOS: 101 U/L / ALT: 30 U/L / AST: 65 U/L / GGT: x                       RADIOLOGY AND ADDITIONAL TESTS:    CONSULTANT NOTES REVIEWED:    CARE DISCUSSED WITH THE FOLLOWING CONSULTANTS/PROVIDERS:

## 2021-06-05 NOTE — PROGRESS NOTE ADULT - PROBLEM SELECTOR PLAN 5
-hold pharmacologic DVT prophylaxis in setting of ICH DVT ppx: Holding in setting of ICH  Diet: Regular  Disposition: Pending clinical course  Code Status: FULL CODE

## 2021-06-05 NOTE — PROGRESS NOTE ADULT - PROBLEM SELECTOR PLAN 3
hepatitis panel with + HBsAg, -HBsAb, -HBcAb IgM, +HBcAb IgG, -HBeAg, +HBeAb = convalescent chronic HBV  -viral load positive  -starting tenofovir  -f/u delta agent  -appreciate hepatology recs  -HIV negative  -AFP negative - Hepatitis panel with + HBsAg, -HBsAb, -HBcAb IgM, +HBcAb IgG, -HBeAg, +HBeAb = convalescent chronic HBV with positive viral load  - C/w tenofovir 300 daily  - F/u Hepatitis delta agent Ab  - Hepatology following, appreciate recs

## 2021-06-05 NOTE — PROGRESS NOTE ADULT - PROBLEM SELECTOR PLAN 4
s/p CIWA: patient denied heavy ETOH use, but daughter reports patient is drinking heavily  CIWA dced  c/w thiamine, folic acid, MV - Patient denied heavy ETOH use, but daughter reports patient is drinking heavily  - S/p CIWA  - C/w thiamine, folic acid, MV - Patient denied heavy ETOH use, but daughter reports patient is drinking heavily  - S/p CIWA  - C/w thiamine, folic acid, multivitamin

## 2021-06-05 NOTE — PROGRESS NOTE ADULT - ATTENDING COMMENTS
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     # Liver Mass - HBV antigen positive, HBV viral load positive. MRI liver not consistent with HCC, but evidence of early cirrhosis. Started on tenofovir 300 daily.  hepatology who plans on reviewing imaging with abdominal radiology regarding next steps.    # ICH - left occipital hemorrhage with vasogenic edema in the left parietal-occipital region stable on repeat CT. Hemorrhagic lesion from metastasis remains high on differential. Will need MRI brain/orbits with anesthesia (severe claustrophobia). 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 INR improving after second dose Vit K. 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     # Liver Mass - HBV antigen positive, HBV viral load positive. MRI liver not consistent with HCC, but evidence of early cirrhosis. Started on tenofovir 300 daily.  hepatology who plans on reviewing imaging with abdominal radiology regarding next steps.    # ICH - left occipital hemorrhage with vasogenic edema in the left parietal-occipital region stable on repeat CT. Hemorrhagic lesion from metastasis remains high on differential. Will need MRI brain/orbits with anesthesia (severe claustrophobia). 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 INR improving after second dose Vit K. Monitor CBC for platelets , transfuse as needed   # Pulmonary Nodules - scattered subcentimeter lung nodules    Discussed with patient and resident  plan of care was d/w daughter

## 2021-06-05 NOTE — PROGRESS NOTE ADULT - PROBLEM SELECTOR PLAN 1
Patient with 2.4 x 3.0 x 2.4 cm left occipital acute intraparenchymal hemorrhage with associated vasogenic edema and mass effect, no midline shift. CTA head/neck 5 hrs later with unchanged left occipital acute intraparenchymal hemorrhage. No active bleeding. Imaging and exam stable. Etiology at this stage is unknown. DDx includes metastatic malignancy, hemorrhagic stroke, amyloid angiopathy, severe coagulopathy c/b spontaneous ICH.  - INR improved to 1.72 after PO vit K yesterday  - platelets goal >100 (currently 116)  - Neuro following, will determine the need for steroid after MRI brain and orbits  - MRI brain and orbits wwo C for stroke workup and rule out underlying lesion - will require anesthesia given patient's claustrophobia  - rEEG after MRI Patient with 2.4 x 3.0 x 2.4 cm left occipital acute intraparenchymal hemorrhage with associated vasogenic edema and mass effect, no midline shift. CTA head/neck 5 hrs later with unchanged left occipital acute intraparenchymal hemorrhage. No active bleeding. Imaging and exam stable. Etiology at this stage is unknown. DDx includes metastatic malignancy, hemorrhagic stroke, amyloid angiopathy, severe coagulopathy c/b spontaneous ICH.  - S/p PO vit K 6/4, INR currently  - Platelets goal >100 (currently   - Neuro following, will determine the need for steroid after MRI brain and orbits  - MRI brain and orbits +/- contrast for stroke workup and rule out underlying lesion - will require anesthesia given patient's claustrophobia  - rEEG after MRI Patient with 2.4 x 3.0 x 2.4 cm left occipital acute intraparenchymal hemorrhage with associated vasogenic edema and mass effect, no midline shift. CTA head/neck 5 hrs later with unchanged left occipital acute intraparenchymal hemorrhage. No active bleeding. Imaging and exam stable. Etiology at this stage is unknown. DDx includes metastatic malignancy, hemorrhagic stroke, amyloid angiopathy, severe coagulopathy c/b spontaneous ICH.  - S/p PO vit K 6/4, INR currently  - Platelets goal >100 (currently   - Neuro following, will determine the need for steroid after MRI brain and orbits  - Pending MRI brain and orbits +/- contrast for stroke workup and rule out underlying lesion - will require anesthesia given patient's claustrophobia. Will f/u regarding timing of MR (likely Monday)  - rEEG after MRI Patient with 2.4 x 3.0 x 2.4 cm left occipital acute intraparenchymal hemorrhage with associated vasogenic edema and mass effect, no midline shift. CTA head/neck 5 hrs later with unchanged left occipital acute intraparenchymal hemorrhage. No active bleeding. Imaging and exam stable. Etiology at this stage is unknown. DDx includes metastatic malignancy, hemorrhagic stroke, amyloid angiopathy, severe coagulopathy c/b spontaneous ICH.  - S/p PO vit K 6/4, INR currently  - Platelets goal >100 (currently   - Neuro following, will determine the need for steroid after MRI brain and orbits  - Pending MRI brain and orbits +/- contrast for stroke workup and rule out underlying lesion - will require anesthesia given patient's claustrophobia. Will f/u regarding timing of MR (likely 6/7)  - rEEG after MRI

## 2021-06-06 LAB
ALBUMIN SERPL ELPH-MCNC: 2.8 G/DL — LOW (ref 3.3–5)
ALP SERPL-CCNC: 110 U/L — SIGNIFICANT CHANGE UP (ref 40–120)
ALT FLD-CCNC: 30 U/L — SIGNIFICANT CHANGE UP (ref 4–33)
ANION GAP SERPL CALC-SCNC: 12 MMOL/L — SIGNIFICANT CHANGE UP (ref 7–14)
AST SERPL-CCNC: 66 U/L — HIGH (ref 4–32)
BILIRUB SERPL-MCNC: 2.4 MG/DL — HIGH (ref 0.2–1.2)
BUN SERPL-MCNC: 5 MG/DL — LOW (ref 7–23)
CALCIUM SERPL-MCNC: 8.5 MG/DL — SIGNIFICANT CHANGE UP (ref 8.4–10.5)
CHLORIDE SERPL-SCNC: 106 MMOL/L — SIGNIFICANT CHANGE UP (ref 98–107)
CO2 SERPL-SCNC: 19 MMOL/L — LOW (ref 22–31)
CREAT SERPL-MCNC: 0.58 MG/DL — SIGNIFICANT CHANGE UP (ref 0.5–1.3)
GLUCOSE SERPL-MCNC: 104 MG/DL — HIGH (ref 70–99)
HCT VFR BLD CALC: 36.1 % — SIGNIFICANT CHANGE UP (ref 34.5–45)
HGB BLD-MCNC: 11.7 G/DL — SIGNIFICANT CHANGE UP (ref 11.5–15.5)
INR BLD: 1.88 RATIO — HIGH (ref 0.88–1.16)
MAGNESIUM SERPL-MCNC: 1.7 MG/DL — SIGNIFICANT CHANGE UP (ref 1.6–2.6)
MCHC RBC-ENTMCNC: 32.4 GM/DL — SIGNIFICANT CHANGE UP (ref 32–36)
MCHC RBC-ENTMCNC: 32.4 PG — SIGNIFICANT CHANGE UP (ref 27–34)
MCV RBC AUTO: 100 FL — SIGNIFICANT CHANGE UP (ref 80–100)
NRBC # BLD: 0 /100 WBCS — SIGNIFICANT CHANGE UP
NRBC # FLD: 0 K/UL — SIGNIFICANT CHANGE UP
PHOSPHATE SERPL-MCNC: 2.8 MG/DL — SIGNIFICANT CHANGE UP (ref 2.5–4.5)
PLATELET # BLD AUTO: 105 K/UL — LOW (ref 150–400)
POTASSIUM SERPL-MCNC: 3.5 MMOL/L — SIGNIFICANT CHANGE UP (ref 3.5–5.3)
POTASSIUM SERPL-SCNC: 3.5 MMOL/L — SIGNIFICANT CHANGE UP (ref 3.5–5.3)
PROT SERPL-MCNC: 6.7 G/DL — SIGNIFICANT CHANGE UP (ref 6–8.3)
PROTHROM AB SERPL-ACNC: 20.8 SEC — HIGH (ref 10.6–13.6)
RBC # BLD: 3.61 M/UL — LOW (ref 3.8–5.2)
RBC # FLD: 14.8 % — HIGH (ref 10.3–14.5)
SODIUM SERPL-SCNC: 137 MMOL/L — SIGNIFICANT CHANGE UP (ref 135–145)
WBC # BLD: 5.61 K/UL — SIGNIFICANT CHANGE UP (ref 3.8–10.5)
WBC # FLD AUTO: 5.61 K/UL — SIGNIFICANT CHANGE UP (ref 3.8–10.5)

## 2021-06-06 PROCEDURE — 99232 SBSQ HOSP IP/OBS MODERATE 35: CPT | Mod: GC

## 2021-06-06 RX ORDER — MAGNESIUM SULFATE 500 MG/ML
1 VIAL (ML) INJECTION ONCE
Refills: 0 | Status: COMPLETED | OUTPATIENT
Start: 2021-06-06 | End: 2021-06-06

## 2021-06-06 RX ORDER — POTASSIUM CHLORIDE 20 MEQ
40 PACKET (EA) ORAL ONCE
Refills: 0 | Status: COMPLETED | OUTPATIENT
Start: 2021-06-06 | End: 2021-06-06

## 2021-06-06 RX ORDER — LANOLIN ALCOHOL/MO/W.PET/CERES
3 CREAM (GRAM) TOPICAL AT BEDTIME
Refills: 0 | Status: DISCONTINUED | OUTPATIENT
Start: 2021-06-06 | End: 2021-06-09

## 2021-06-06 RX ORDER — PHYTONADIONE (VIT K1) 5 MG
10 TABLET ORAL ONCE
Refills: 0 | Status: COMPLETED | OUTPATIENT
Start: 2021-06-06 | End: 2021-06-06

## 2021-06-06 RX ADMIN — Medication 1 MILLIGRAM(S): at 12:01

## 2021-06-06 RX ADMIN — Medication 1 TABLET(S): at 12:01

## 2021-06-06 RX ADMIN — Medication 10 MILLIGRAM(S): at 12:21

## 2021-06-06 RX ADMIN — Medication 20 MILLIEQUIVALENT(S): at 12:01

## 2021-06-06 RX ADMIN — TENOFOVIR DISOPROXIL FUMARATE 300 MILLIGRAM(S): 300 TABLET, FILM COATED ORAL at 12:02

## 2021-06-06 RX ADMIN — Medication 100 GRAM(S): at 12:02

## 2021-06-06 NOTE — PROGRESS NOTE ADULT - PROBLEM SELECTOR PLAN 3
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

## 2021-06-06 NOTE — PROGRESS NOTE ADULT - PROBLEM SELECTOR PLAN 2
CTAP with Indeterminate infiltrative liver mass; concerning for malignancy. h/o alcohol use, no signs of withdrawal currently, s/p CIWA. CT revealed pulmonary nodules, liver mass, and possible hemorrhagic brain mets; Will need malignancy workup. MR showing heterogeneous enhancement in R hepatic lobe concerning for perfusion limitation - not diagnostic for HCC. Acute hepatitis panel concerning for HBV (see below)  -no mass - likely perfusion abnormality represents hepatic congestion/cirrhosis 2/2 alcohol use + chronic HBV  -can undergo vibration cirrhosis testing as outpatient  -MELD 17  -repeat MRI in 6 months  -still unclear if patient has malignancy vs brain bleed from other source although pulmonary nodules remain a question    #Malignancy Screening:  Hgb 13.6 on admission  Cancer hx: no personal hx of cancers; 2 sisters passed with gastric cancer, daughter has breast cancer  Cancer screening:  Mammo and US wnl last year, but has had multiple workup/biopsies in the past for abnormal findings on mammo (all benign findings);   Last colonoscopy 3 years ago, normal finding per report  Never had EGD  PAP smear last year, reportedly normal

## 2021-06-06 NOTE — PROGRESS NOTE ADULT - ATTENDING COMMENTS
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     # Liver Mass - HBV antigen positive, HBV viral load positive. MRI liver not consistent with HCC, but evidence of early cirrhosis. Started on tenofovir 300 daily.  hepatology who plans on reviewing imaging with abdominal radiology regarding next steps.    # ICH - left occipital hemorrhage with vasogenic edema in the left parietal-occipital region stable on repeat CT. Hemorrhagic lesion from metastasis remains high on differential. Will need MRI brain/orbits with anesthesia (severe claustrophobia). 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 INR improving after second dose Vit K. Monitor CBC for platelets , transfuse as needed   # Pulmonary Nodules - scattered subcentimeter lung nodules    Discussed with patient and daughter at bedside

## 2021-06-06 NOTE — PROGRESS NOTE ADULT - PROBLEM SELECTOR PLAN 5
DVT ppx: SCDs. Holding heparin in setting of ICH  Diet: Regular  Disposition: Pending clinical course  Code Status: FULL CODE

## 2021-06-06 NOTE — PROGRESS NOTE ADULT - PROBLEM SELECTOR PLAN 1
Patient with 2.4 x 3.0 x 2.4 cm left occipital acute intraparenchymal hemorrhage with associated vasogenic edema and mass effect, no midline shift. CTA head/neck 5 hrs later with unchanged left occipital acute intraparenchymal hemorrhage. No active bleeding. Imaging and exam stable. Etiology at this stage is unknown. DDx includes metastatic malignancy, hemorrhagic stroke, amyloid angiopathy, severe coagulopathy c/b spontaneous ICH.  - S/p PO vit K 6/4, INR currently  - Platelets goal >100 (currently   - Neuro following, will determine the need for steroid after MRI brain and orbits  - Pending MRI brain and orbits +/- contrast for stroke workup and rule out underlying lesion - will require anesthesia given patient's claustrophobia. Will f/u regarding timing of MR (likely 6/7)  - rEEG after MRI

## 2021-06-06 NOTE — PROGRESS NOTE ADULT - PROBLEM SELECTOR PLAN 4
- Patient denied heavy ETOH use, but daughter reports patient is drinking heavily  - S/p CIWA  - C/w thiamine, folic acid, multivitamin

## 2021-06-06 NOTE — PROGRESS NOTE ADULT - SUBJECTIVE AND OBJECTIVE BOX
*******************************************************  Pedro Navarro MD PGY-1  Internal Medicine  [employer] on [employer]  Pager [facility]) [phone] / [phone]  *******************************************************  Patient is a 65y old  Female who presents with a chief complaint of Intraparenchymal Hemorrhage ICH (05 Jun 2021 06:44)    SUBJECTIVE / OVERNIGHT EVENTS:  - No acute events overnight.   - Patient seen and evaluated at bedside.  - No complaints.  - ROS: Denies fevers/chills, headache, SOB at rest, cough, chest pain, palpitations, abdominal pain, nausea/vomiting/diarrhea/constipation, melena/hematochezia, dysuria, and hematuria    ------------------------------------------------------------------------------------------------------------    MEDICATIONS  (STANDING):  folic acid 1 milliGRAM(s) Oral daily  magnesium sulfate  IVPB 1 Gram(s) IV Intermittent once  multivitamin 1 Tablet(s) Oral daily  potassium chloride    Tablet ER 40 milliEquivalent(s) Oral once  tenofovir disoproxil fumarate (VIREAD) 300 milliGRAM(s) Oral daily    MEDICATIONS  (PRN):      ------------------------------------------------------------------------------------------------------------    OBJECTIVE:    CAPILLARY BLOOD GLUCOSE        I&O's Summary    Daily     Daily   Weight (kg): 54 (05-29-21 @ 02:43)    PHYSICAL EXAM:  T(F): 98.4, Max: 98.4 (06-06-21 @ 04:56)  HR: 72 (72 - 79)  BP: 118/58 (113/61 - 131/72)  RR: 16 (16 - 18)  SpO2: 98% (97% - 98%)        CONSTITUTIONAL: NAD, well-developed  HEENT: NCAT, EOMI, no scleral icterus, MMM, Mallampati 3  RESPIRATORY/CHEST: Normal respiratory effort; lungs are clear to auscultation bilaterally; no wheezing/crackles  CARDIO: Regular rate, normal S1 and S2, +systolic 2/6 murmur. No rub/gallop; No JVD  VASCULAR: No lower extremity edema; Peripheral pulses are 2+ bilaterally; Capillary refill brisk  ABDOMEN: Soft, nontender to palpation, normoactive bowel sounds, no rebound/guarding; No hepatosplenomegaly  MUSCULOSKELETAL: no joint swelling or tenderness to palpation, full strength all extremities.  EXTREMITIES: hands/feet are warm, and without cyanosis or clubbing  SKIN: no visible rashes, pallor, diaphoresis, or jaundice  NEURO: No focal deficits, moving all extremities; intact sensation/strength x 4 ext  PSYCH: A+O to person, place, and time; affect appropriate; cooperative    ------------------------------------------------------------------------------------------------------------  LABS:                        11.7   5.61  )-----------( 105      ( 06 Jun 2021 06:25 )             36.1     06-06    137  |  106  |  5<L>  ----------------------------<  104<H>  3.5   |  19<L>  |  0.58    Ca    8.5      06 Jun 2021 06:25  Phos  2.8     06-06  Mg     1.7     06-06    TPro  6.7  /  Alb  2.8<L>  /  TBili  2.4<H>  /  DBili  x   /  AST  66<H>  /  ALT  30  /  AlkPhos  110  06-06    PT/INR - ( 06 Jun 2021 06:25 )   PT: 20.8 sec;   INR: 1.88 ratio                     RADIOLOGY & ADDITIONAL TESTS:  Results Reviewed:     Imaging Personally Reviewed:  Electrocardiogram Personally Reviewed:      COORDINATION OF CARE:  Care discussed with consultants/other providers and notes reviewed [Y]:   ------------------------------------------------------------------------------------------------------------

## 2021-06-07 LAB
ALBUMIN SERPL ELPH-MCNC: 3 G/DL — LOW (ref 3.3–5)
ALP SERPL-CCNC: 118 U/L — SIGNIFICANT CHANGE UP (ref 40–120)
ALT FLD-CCNC: 31 U/L — SIGNIFICANT CHANGE UP (ref 4–33)
ANION GAP SERPL CALC-SCNC: 11 MMOL/L — SIGNIFICANT CHANGE UP (ref 7–14)
APTT BLD: 59.8 SEC — HIGH (ref 27–36.3)
AST SERPL-CCNC: 70 U/L — HIGH (ref 4–32)
BILIRUB SERPL-MCNC: 2.5 MG/DL — HIGH (ref 0.2–1.2)
BLD GP AB SCN SERPL QL: NEGATIVE — SIGNIFICANT CHANGE UP
BUN SERPL-MCNC: 6 MG/DL — LOW (ref 7–23)
CALCIUM SERPL-MCNC: 8.6 MG/DL — SIGNIFICANT CHANGE UP (ref 8.4–10.5)
CHLORIDE SERPL-SCNC: 107 MMOL/L — SIGNIFICANT CHANGE UP (ref 98–107)
CO2 SERPL-SCNC: 20 MMOL/L — LOW (ref 22–31)
CREAT SERPL-MCNC: 0.55 MG/DL — SIGNIFICANT CHANGE UP (ref 0.5–1.3)
GLUCOSE SERPL-MCNC: 116 MG/DL — HIGH (ref 70–99)
HCT VFR BLD CALC: 36.1 % — SIGNIFICANT CHANGE UP (ref 34.5–45)
HGB BLD-MCNC: 12.1 G/DL — SIGNIFICANT CHANGE UP (ref 11.5–15.5)
INR BLD: 1.73 RATIO — HIGH (ref 0.88–1.16)
MAGNESIUM SERPL-MCNC: 1.9 MG/DL — SIGNIFICANT CHANGE UP (ref 1.6–2.6)
MCHC RBC-ENTMCNC: 32.7 PG — SIGNIFICANT CHANGE UP (ref 27–34)
MCHC RBC-ENTMCNC: 33.5 GM/DL — SIGNIFICANT CHANGE UP (ref 32–36)
MCV RBC AUTO: 97.6 FL — SIGNIFICANT CHANGE UP (ref 80–100)
NRBC # BLD: 0 /100 WBCS — SIGNIFICANT CHANGE UP
NRBC # FLD: 0 K/UL — SIGNIFICANT CHANGE UP
PHOSPHATE SERPL-MCNC: 2.7 MG/DL — SIGNIFICANT CHANGE UP (ref 2.5–4.5)
PLATELET # BLD AUTO: 107 K/UL — LOW (ref 150–400)
POTASSIUM SERPL-MCNC: 3.7 MMOL/L — SIGNIFICANT CHANGE UP (ref 3.5–5.3)
POTASSIUM SERPL-SCNC: 3.7 MMOL/L — SIGNIFICANT CHANGE UP (ref 3.5–5.3)
PROT SERPL-MCNC: 6.9 G/DL — SIGNIFICANT CHANGE UP (ref 6–8.3)
PROTHROM AB SERPL-ACNC: 19.4 SEC — HIGH (ref 10.6–13.6)
RBC # BLD: 3.7 M/UL — LOW (ref 3.8–5.2)
RBC # FLD: 15 % — HIGH (ref 10.3–14.5)
RH IG SCN BLD-IMP: POSITIVE — SIGNIFICANT CHANGE UP
SODIUM SERPL-SCNC: 138 MMOL/L — SIGNIFICANT CHANGE UP (ref 135–145)
WBC # BLD: 6.23 K/UL — SIGNIFICANT CHANGE UP (ref 3.8–10.5)
WBC # FLD AUTO: 6.23 K/UL — SIGNIFICANT CHANGE UP (ref 3.8–10.5)

## 2021-06-07 PROCEDURE — 99232 SBSQ HOSP IP/OBS MODERATE 35: CPT | Mod: GC

## 2021-06-07 RX ADMIN — Medication 1 MILLIGRAM(S): at 11:46

## 2021-06-07 RX ADMIN — Medication 1 TABLET(S): at 11:47

## 2021-06-07 RX ADMIN — TENOFOVIR DISOPROXIL FUMARATE 300 MILLIGRAM(S): 300 TABLET, FILM COATED ORAL at 12:32

## 2021-06-07 NOTE — PROGRESS NOTE ADULT - ATTENDING COMMENTS
65F with no known PMH who presented to the ED with headache and vision changes, found to have an intraparenchymal hemorrhage on CTH and indeterminate infiltrative liver mass, now thought to be diffusion abnormality in setting of cirrhosis with evidence of coagulopathy secondary to liver dysfunction.     # Liver Mass - HBV antigen positive, HBV viral load positive. MRI liver not consistent with HCC, but evidence of early cirrhosis. Started on tenofovir 300 daily. Hepatology is recommending OP follow up.   # ICH - left occipital hemorrhage with vasogenic edema in the left parietal-occipital region stable on repeat CT. Will need MRI brain/orbits with anesthesia (severe claustrophobia) tomorrow.   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 INR improving. Monitor CBC for platelets and INR.  # Pulmonary Nodules - scattered subcentimeter lung nodules, if above is negative can follow up at outpatient.     Discussed with patient, daughter and HS5

## 2021-06-07 NOTE — PROGRESS NOTE ADULT - PROBLEM SELECTOR PLAN 1
Patient with 2.4 x 3.0 x 2.4 cm left occipital acute intraparenchymal hemorrhage with associated vasogenic edema and mass effect, no midline shift. CTA head/neck 5 hrs later with unchanged left occipital acute intraparenchymal hemorrhage. No active bleeding. Imaging and exam stable. Etiology at this stage is unknown. DDx includes metastatic malignancy, hemorrhagic stroke, amyloid angiopathy, severe coagulopathy c/b spontaneous ICH.  - S/p PO vit K 6/4, INR currently  - Platelets goal >100 (currently   - Neuro following, will determine the need for steroid after MRI brain and orbits  - Pending MRI brain and orbits +/- contrast for stroke workup and rule out underlying lesion - will require anesthesia given patient's claustrophobia. Will f/u regarding timing of MR (likely 6/8)  - EEG today

## 2021-06-07 NOTE — PROGRESS NOTE ADULT - SUBJECTIVE AND OBJECTIVE BOX
*******************************************************  Pedro Navarro MD PGY-1  Internal Medicine  [employer] on [employer]  Pager [facility]) [phone] / [phone]  *******************************************************  Patient is a 65y old  Female who presents with a chief complaint of Intraparenchymal Hemorrhage ICH (06 Jun 2021 08:38)    SUBJECTIVE / OVERNIGHT EVENTS:  - No acute events overnight.   - Patient seen and evaluated at bedside.  - no complaints  - ROS: Denies fevers/chills, headache, SOB at rest, cough, chest pain, palpitations, abdominal pain, nausea/vomiting/diarrhea/constipation, melena/hematochezia, dysuria, and hematuria    ------------------------------------------------------------------------------------------------------------    MEDICATIONS  (STANDING):  folic acid 1 milliGRAM(s) Oral daily  melatonin 3 milliGRAM(s) Oral at bedtime  multivitamin 1 Tablet(s) Oral daily  tenofovir disoproxil fumarate (VIREAD) 300 milliGRAM(s) Oral daily    MEDICATIONS  (PRN):      ------------------------------------------------------------------------------------------------------------    OBJECTIVE:    CAPILLARY BLOOD GLUCOSE        I&O's Summary    Daily     Daily   Weight (kg): 54 (05-29-21 @ 02:43)    PHYSICAL EXAM:  T(F): 98.3, Max: 98.7 (06-06-21 @ 17:44)  HR: 80 (74 - 84)  BP: 105/56 (105/56 - 125/64)  RR: 16 (16 - 18)  SpO2: 97% (97% - 99%)    CONSTITUTIONAL: NAD, well-developed  HEENT: NCAT, EOMI, no scleral icterus, MMM, Mallampati 3  RESPIRATORY/CHEST: Normal respiratory effort; lungs are clear to auscultation bilaterally; no wheezing/crackles  CARDIO: Regular rate, normal S1 and S2, +systolic 2/6 murmur. No rub/gallop; No JVD  VASCULAR: No lower extremity edema; Peripheral pulses are 2+ bilaterally; Capillary refill brisk  ABDOMEN: Soft, nontender to palpation, normoactive bowel sounds, no rebound/guarding; No hepatosplenomegaly  MUSCULOSKELETAL: no joint swelling or tenderness to palpation, full strength all extremities.  EXTREMITIES: hands/feet are warm, and without cyanosis or clubbing  SKIN: no visible rashes, pallor, diaphoresis, or jaundice  NEURO: No focal deficits, moving all extremities; intact sensation/strength x 4 ext  PSYCH: A+O to person, place, and time; affect appropriate; cooperative    ------------------------------------------------------------------------------------------------------------  LABS:                        12.1   6.23  )-----------( 107      ( 07 Jun 2021 04:29 )             36.1     06-07    138  |  107  |  6<L>  ----------------------------<  116<H>  3.7   |  20<L>  |  0.55    Ca    8.6      07 Jun 2021 04:29  Phos  2.7     06-07  Mg     1.9     06-07    TPro  6.9  /  Alb  3.0<L>  /  TBili  2.5<H>  /  DBili  x   /  AST  70<H>  /  ALT  31  /  AlkPhos  118  06-07    PT/INR - ( 07 Jun 2021 04:29 )   PT: 19.4 sec;   INR: 1.73 ratio         PTT - ( 07 Jun 2021 04:29 )  PTT:59.8 sec            RADIOLOGY & ADDITIONAL TESTS:  Results Reviewed:     Imaging Personally Reviewed:  Electrocardiogram Personally Reviewed:      COORDINATION OF CARE:  Care discussed with consultants/other providers and notes reviewed [Y]:   ------------------------------------------------------------------------------------------------------------

## 2021-06-07 NOTE — PROGRESS NOTE ADULT - SUBJECTIVE AND OBJECTIVE BOX
Neurology Progress Note    S: Patient seen and examined. No new events overnight. patient denied CP, SOB, HA or pain. no change. EEG neg prelim    Medication:  MEDICATIONS  (STANDING):  folic acid 1 milliGRAM(s) Oral daily  melatonin 3 milliGRAM(s) Oral at bedtime  multivitamin 1 Tablet(s) Oral daily  tenofovir disoproxil fumarate (VIREAD) 300 milliGRAM(s) Oral daily    MEDICATIONS  (PRN):      Vitals:  Vital Signs Last 24 Hrs  T(C): 36.7 (07 Jun 2021 13:03), Max: 36.8 (07 Jun 2021 08:30)  T(F): 98.1 (07 Jun 2021 13:03), Max: 98.3 (07 Jun 2021 08:30)  HR: 63 (07 Jun 2021 13:03) (63 - 83)  BP: 122/72 (07 Jun 2021 13:03) (105/56 - 125/64)  BP(mean): --  RR: 16 (07 Jun 2021 13:03) (16 - 16)  SpO2: 98% (07 Jun 2021 13:03) (97% - 99%)    General Exam:   General Appearance: Appropriately dressed and in no acute distress       Head: Normocephalic, atraumatic and no dysmorphic features  Ear, Nose, and Throat: Moist mucous membranes  CVS: S1S2+  Resp: No SOB, no wheeze or rhonchi  Abd: soft NTND  Extremities: No edema, no cyanosis  Skin: No bruises, no rashes     Neurological Exam:  Mental Status: Awake, alert and oriented x 3.  Able to follow simple and complex verbal commands. Able to name and repeat. fluent speech. No obvious aphasia or dysarthria noted.   Cranial Nerves: PERRL, EOMI, RHHA improving  , sensation V1-V3 intact, R facial asymmetry , equal elevation of palate, scm/trap 5/5, tongue is midline on protrusion. no obvious papilledema on fundoscopic exam. Hearing is grossly intact.   Motor: Normal bulk, tone and strength throughout. Fine finger movements were intact and symmetric. no tremors or drift noted.    Sensation: Intact to light touch and pinprick throughout. no right/left confusion. no extinction to tactile on DSS.  Reflexes: 1+ throughout at biceps, brachioradialis, triceps, patellars and ankles bilaterally and equal. No clonus. R toe and L toe were both downgoing.  Coordination: No dysmetria on FNF   Gait: deferred     I personally reviewed the below data/images/labs:    CBC Full  -  ( 07 Jun 2021 04:29 )  WBC Count : 6.23 K/uL  RBC Count : 3.70 M/uL  Hemoglobin : 12.1 g/dL  Hematocrit : 36.1 %  Platelet Count - Automated : 107 K/uL  Mean Cell Volume : 97.6 fL  Mean Cell Hemoglobin : 32.7 pg  Mean Cell Hemoglobin Concentration : 33.5 gm/dL  Auto Neutrophil # : x  Auto Lymphocyte # : x  Auto Monocyte # : x  Auto Eosinophil # : x  Auto Basophil # : x  Auto Neutrophil % : x  Auto Lymphocyte % : x  Auto Monocyte % : x  Auto Eosinophil % : x  Auto Basophil % : x    06-07    138  |  107  |  6<L>  ----------------------------<  116<H>  3.7   |  20<L>  |  0.55    Ca    8.6      07 Jun 2021 04:29  Phos  2.7     06-07  Mg     1.9     06-07    TPro  6.9  /  Alb  3.0<L>  /  TBili  2.5<H>  /  DBili  x   /  AST  70<H>  /  ALT  31  /  AlkPhos  118  06-07  CT head w/o contrast (20:00): IMPRESSION:   2.4 x 3.0 x 2.4 cm left occipital acute intraparenchymal hemorrhage with associated vasogenic edema and mass effect as described. There is no midline shift.    Repeat CT head w/o contrast (2357):   CT brain: Unchanged left occipital acute intraparenchymal hemorrhage. No active bleeding.    CT angiography neck: No hemodynamically significant stenosis by NASCET criteria. No vascular dissection.    CT angiography brain: No major vessel occlusion or proximal stenosis. No aneurysm or vascular malformation.  < from: CT Head No Cont (05.29.21 @ 09:23) >    EXAM:  CT BRAIN        PROCEDURE DATE:  May 29 2021         INTERPRETATION:  INDICATION:  Left occipital hemorrhage. Follow-up.  TECHNIQUE:  A non contrast 2.5 or 3 mm axial CT study of the brain was performed from skull base to vertex. Coronal andsagittal reformations were generated from the axial data.  COMPARISON EXAMINATION:  CT dated 5/28/2021.      FINDINGS:    HEMISPHERES:  A subacute hematoma is again identified in the left occipital region. This is unchanged in size without evidence of further hemorrhage. There is underlying white matter edema in the left the parietal and occipital region with an appearance suggesting vasogenic edema. A hemorrhagic met, therefore, cannot be ruled out. Follow-up MR imaging with and without gadoliniumrecommended for further assessment along with further clinical correlation. There are no other areas of hemorrhage.  VENTRICLES:  Midline and normal in size.  POSTERIOR FOSSA:  The brain stem and cerebellum are unremarkable.  No CP angle lesion noted.  EXTRACEREBRAL SPACES:  No subdural or epidural collections are noted.  SKULL BASE AND CALVARIUM:  Appears intact.  No fracture or destructive lesion is identified.  SINUSES AND MASTOIDS:  Clear.  MISCELLANEOUS:  No orbital or suprasellar abnormality noted.    IMPRESSION:    1)  subacute left occipital hemorrhage with vasogenic edema in the left parietal-occipital region. A hemorrhagic metastasis is within the differential. Further workup and assessment recommended. Follow-up MR imaging with andwithout gadolinium may be considered for further evaluation.  2)  no significant midline shift shift or hydrocephalus.    No significant change when compared with prior CT.          MIKO CHARLES MD; Attending Radiologist  This document has been electronically signed. May 29 2021  9:33AM    < end of copied text >  < from: Transthoracic Echocardiogram (05.31.21 @ 09:01) >    Patient name: MENDOZA, GRISELDA  YOB: 1955   Age: 65 (F)   MR#: [id number]  Study Date: 5/31/2021  Location: 616A Bubble StudySonographer: Jessica Elliott RDCS  Study quality: Technically good  Referring Physician: Cristino Han MD  Blood Pressure: 134/86 mmHg  Height: 154 cm  Weight: 59 kg  BSA: 1.6 m2  ------------------------------------------------------------------------  PROCEDURE: Transthoracic echocardiogram with 2-D, M-Mode  and complete spectral and color flow Doppler. Patient was  injected with 10 cc's of aerosolized saline.  Patient was injected with 10 cc's of aerosolized saline.  INDICATION: Cerebral infarction, unspecified (I63.9)  ------------------------------------------------------------------------  DIMENSIONS:  Dimensions:     Normal Values:  LA:     3.4 cm    2.0 - 4.0 cm  Ao:     2.4 cm    2.0 - 3.8 cm  SEPTUM: 0.7 cm    0.6 - 1.2 cm  PWT:    0.8 cm    0.6 - 1.1 cm  LVIDd:  4.3 cm    3.0 - 5.6 cm  LVIDs:  2.6 cm    1.8 - 4.0 cm  Derived Variables:  LVMI: 62 g/m2  RWT: 0.37  Fractional short: 40 %  Ejection Fraction (Teicholtz): 70 %  ------------------------------------------------------------------------  OBSERVATIONS:  Mitral Valve: Normal mitral valve. Mild-moderate mitral  regurgitation.  Aortic Root: Normal aortic root.  Aortic Valve: Normal trileaflet aortic valve.  Left Atrium: Normal left atrium.  LA volume index = 18  cc/m2.  Left Ventricle: Normal left ventricular systolic function.  No segmental wall motion abnormalities. Normal left  ventricular internal dimensions and wall thicknesses.  Normal left ventricular diastolic function.  Right Heart: Normal right atrium. Normal right ventricular  size and function. Normal tricuspid valve. Mild tricuspid  regurgitation. Normal pulmonic valve.  Pericardium/PleuraNormal pericardium with no pericardial  effusion.  Hemodynamic: Estimated right ventricular systolic pressure  equals 40 mm Hg, assuming right atrial pressure equals 10  mm Hg, consistent with mild pulmonary hypertension.  Agitated saline injection demonstrates no evidence of a  patent foramen ovale.  ------------------------------------------------------------------------  CONCLUSIONS:  1. Normal mitral valve. Mild-moderate mitral regurgitation.  2. Normal trileafletaortic valve.  3. Normal left ventricular internal dimensions and wall  thicknesses.  4. Normal left ventricular systolic function. No segmental  wall motion abnormalities.  5. Normal left ventricular diastolic function.  6. Normal right ventricular size and function.  7. Normal tricuspid valve. Mild tricuspid regurgitation.  8. Estimated pulmonary artery systolic pressure equals 40  mm Hg, assuming right atrial pressure equals 10  mm Hg,  consistent with mild pulmonary hypertension.  9. Agitatedsaline injection demonstrates no evidence of a  patent foramen ovale.  *** No previous Echo exam.  ------------------------------------------------------------------------  Confirmed on  5/31/2021 - 10:02:23 by Dalia Kimball MD  ------------------------------------------------------------------------    < end of copied text >  A1C with Estimated Average Glucose in AM (06.01.21 @ 07:28)   A1C with Estimated Average Glucose Result: 5.0:   LDL Cholesterol Calculated: 93 mg/dL (06.01.21 @ 07:24)     < from: MR Abdomen w/ IV Cont (06.03.21 @ 14:16) >    EXAM:  MR ABDOMEN IC        PROCEDURE DATE:  Navin  3 2021         INTERPRETATION:  CLINICAL INFORMATION: Evaluate liver mass on CT. Hepatitis B.    COMPARISON: CT abdomen pelvis 5/28/2021    CONTRAST/COMPLICATIONS:  IV Contrast: Gadavist  5.5 cc administered   2 cc discarded  Oral Contrast: NONE  Complications: None reported at time of study completion    PROCEDURE:  MRI of the abdomen was performed.  MRCP was performed.    FINDINGS:  Motion degraded study.    LOWER CHEST: Within normal limits.    LIVER: Segmental heterogeneous enhancement of the right hepatic lobe on the arterial and portal venous phases corresponding to the abnormality on CT. No corresponding T1 signal abnormality or restricted diffusion. No displacement of vascular structures.  BILE DUCTS: Normal caliber.  GALLBLADDER: Layering sludge.  SPLEEN: Within normal limits.  PANCREAS: Within normal limits.  ADRENALS: Within normal limits.  KIDNEYS/URETERS: Within normal limits.    VISUALIZED PORTIONS:  BOWEL: Within normal limits.  PERITONEUM: Trace perihepatic ascites.  VESSELS: Hepatic and portal veins are patent.  RETROPERITONEUM/LYMPH NODES: No lymphadenopathy.  ABDOMINAL WALL: Within normal limits.  BONES: Degenerative changes.    IMPRESSION:  Motion limited study.    Segmental heterogeneous enhancement in the right hepatic lobe corresponding to the findings on CT, may represent perfusional abnormality. Consider short interval follow-up imaging.            CRISTAL ROYAL MD; Resident Radiology  This document has been electronically signed.  GABBY DOZIER MD; Attending Radiologist  This document has been electronically signed. Navin  3 2021  5:37PM    < end of copied text >

## 2021-06-07 NOTE — CHART NOTE - NSCHARTNOTEFT_GEN_A_CORE
Alerted by RN that pt experiencing nausea. Assessed pt at bedside iso hx ICH, reporting nausea w/o emesis, denies change in mental status, acute change in visual acuity, reports feeling generally well otherwise. CN II-XII grossly intact (some saccadic movement on examination of EOM), no pronator drift, strength and sensation grossly equal and intact b/l UE and LE. Pt reports nausea improving, will monitor, RN to alert w/ any change in clinical status.

## 2021-06-07 NOTE — CHART NOTE - NSCHARTNOTEFT_GEN_A_CORE
F/U severe malnutrition.  Patient is a 65y old  Female who presents with a chief complaint of Intraparenchymal Hemorrhage ICH; patient also found with infiltrative liver mass concerning for metastatic neoplasm. 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 Patient noted with good PO intake per nursing PO intake record.  No reported chewing/swallowing difficulties. No GI distress noted i.e. nausea, vomiting, diarrhea.    Source: Patient [ ]    Family [ ]     other [ X ] Chart     Diet, NPO after Midnight:      NPO Start Date: 07-Jun-2021,   NPO Start Time: 23:59  Except Medications (06-07-21 @ 10:58)  Diet, Regular (05-29-21 @ 06:49)          Current Weight:       Pertinent Medications: folic acid  melatonin  multivitamin    Pertinent Labs:  06-07 Na138 mmol/L Glu 116 mg/dL<H> K+ 3.7 mmol/L Cr  0.55 mg/dL BUN 6 mg/dL<L> 06-07 Phos 2.7 mg/dL 06-07 Alb 3.0 g/dL<L> 06-01 Chol 141 mg/dL LDL --    HDL 29 mg/dL<L> Trig 95 mg/dL      Skin:     Estimated Needs:   [ ] no change since previous assessment  [ ] recalculated:       Previous Nutrition Diagnosis:     [ ] Inadequate Energy Intake [ ]Inadequate Oral Intake [ ] Excessive Energy Intake     [ ] Underweight [ ] Increased Nutrient Needs [ ] Overweight/Obesity     [ ] Altered GI Function [ ] Unintended Weight Loss [ ] Food & Nutrition Related Knowledge Deficit [ ] Malnutrition      Nutrition Diagnosis is [ ] ongoing  [ ] resolved [ ] not applicable          Additional Recommendations: F/U severe malnutrition.  Patient is a 65y old  Female who presents with a chief complaint of Intraparenchymal Hemorrhage ICH; patient also found with infiltrative liver mass concerning for metastatic neoplasm. 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 Patient noted with good PO intake per nursing PO intake record.  No reported chewing/swallowing difficulties. No GI distress noted i.e. nausea, vomiting, diarrhea. Patient reports a good appetite, satisfied with meals, no nutritional issues. Requests for ginger ale with lunch and dinner meals. Magic Cups and Orgain shakes previously implemented at time of initial RD assessment.    Source: Patient [ ]    Family [ ]     other [ X ] Chart     Diet, NPO after Midnight:      NPO Start Date: 07-Jun-2021,   NPO Start Time: 23:59  Except Medications (06-07-21 @ 10:58)  Diet, Regular (05-29-21 @ 06:49)    Current Weight: 5/29 - 54kg / 55.1kg       Pertinent Medications:   folic acid  melatonin  multivitamin    Pertinent Labs:  06-07 Na138 mmol/L Glu 116 mg/dL<H> K+ 3.7 mmol/L Cr  0.55 mg/dL BUN 6 mg/dL<L> 06-07 Phos 2.7 mg/dL 06-07 Alb 3.0 g/dL<L> 06-01 Chol 141 mg/dL LDL --    HDL 29 mg/dL<L> Trig 95 mg/dL    Skin: No pressure injuries noted, no edema    Estimated Needs:   [ X ] no change since previous assessment  [ ] recalculated:       Previous Nutrition Diagnosis:   Severe Malnutrition, ongoing    Additional Recommendations:  1) Monitor weights, PO intake/diet tolerance, skin integrity, pertinent labs.   2) Honor food preferences as requested.

## 2021-06-07 NOTE — CHART NOTE - NSCHARTNOTEFT_GEN_A_CORE
EEG Fellow's prelim read.   EEG reviewed until 4:30  pm. No seizure seen.   Official result to be followed in am

## 2021-06-07 NOTE — PROGRESS NOTE ADULT - ASSESSMENT
65F with no known PMH who presented to the ED with headache and vision changes, found to have an intraparenchymal hemorrhage on CTH and indeterminate infiltrative liver mass concerning for metastatic neoplasm, s/p MICU stay for q1 hour neuro checks. 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 MR abdomen not diagnostic for HCC. Pending MR brain and orbits.

## 2021-06-08 LAB
ALBUMIN SERPL ELPH-MCNC: 2.9 G/DL — LOW (ref 3.3–5)
ALP SERPL-CCNC: 93 U/L — SIGNIFICANT CHANGE UP (ref 40–120)
ALT FLD-CCNC: 34 U/L — HIGH (ref 4–33)
ANION GAP SERPL CALC-SCNC: 11 MMOL/L — SIGNIFICANT CHANGE UP (ref 7–14)
APTT BLD: 40.5 SEC — HIGH (ref 27–36.3)
AST SERPL-CCNC: 77 U/L — HIGH (ref 4–32)
BILIRUB SERPL-MCNC: 2.8 MG/DL — HIGH (ref 0.2–1.2)
BUN SERPL-MCNC: 5 MG/DL — LOW (ref 7–23)
CALCIUM SERPL-MCNC: 8.6 MG/DL — SIGNIFICANT CHANGE UP (ref 8.4–10.5)
CHLORIDE SERPL-SCNC: 106 MMOL/L — SIGNIFICANT CHANGE UP (ref 98–107)
CO2 SERPL-SCNC: 20 MMOL/L — LOW (ref 22–31)
CREAT SERPL-MCNC: 0.56 MG/DL — SIGNIFICANT CHANGE UP (ref 0.5–1.3)
GLUCOSE BLDC GLUCOMTR-MCNC: 107 MG/DL — HIGH (ref 70–99)
GLUCOSE SERPL-MCNC: 112 MG/DL — HIGH (ref 70–99)
HCT VFR BLD CALC: 35.7 % — SIGNIFICANT CHANGE UP (ref 34.5–45)
HGB BLD-MCNC: 12 G/DL — SIGNIFICANT CHANGE UP (ref 11.5–15.5)
INR BLD: 1.64 RATIO — HIGH (ref 0.88–1.16)
MAGNESIUM SERPL-MCNC: 1.7 MG/DL — SIGNIFICANT CHANGE UP (ref 1.6–2.6)
MCHC RBC-ENTMCNC: 32.6 PG — SIGNIFICANT CHANGE UP (ref 27–34)
MCHC RBC-ENTMCNC: 33.6 GM/DL — SIGNIFICANT CHANGE UP (ref 32–36)
MCV RBC AUTO: 97 FL — SIGNIFICANT CHANGE UP (ref 80–100)
NRBC # BLD: 0 /100 WBCS — SIGNIFICANT CHANGE UP
NRBC # FLD: 0 K/UL — SIGNIFICANT CHANGE UP
PHOSPHATE SERPL-MCNC: 2.8 MG/DL — SIGNIFICANT CHANGE UP (ref 2.5–4.5)
PLATELET # BLD AUTO: 109 K/UL — LOW (ref 150–400)
POTASSIUM SERPL-MCNC: 3.7 MMOL/L — SIGNIFICANT CHANGE UP (ref 3.5–5.3)
POTASSIUM SERPL-SCNC: 3.7 MMOL/L — SIGNIFICANT CHANGE UP (ref 3.5–5.3)
PROT SERPL-MCNC: 6.8 G/DL — SIGNIFICANT CHANGE UP (ref 6–8.3)
PROTHROM AB SERPL-ACNC: 18.3 SEC — HIGH (ref 10.6–13.6)
RBC # BLD: 3.68 M/UL — LOW (ref 3.8–5.2)
RBC # FLD: 14.7 % — HIGH (ref 10.3–14.5)
SODIUM SERPL-SCNC: 137 MMOL/L — SIGNIFICANT CHANGE UP (ref 135–145)
WBC # BLD: 5.87 K/UL — SIGNIFICANT CHANGE UP (ref 3.8–10.5)
WBC # FLD AUTO: 5.87 K/UL — SIGNIFICANT CHANGE UP (ref 3.8–10.5)

## 2021-06-08 PROCEDURE — 70553 MRI BRAIN STEM W/O & W/DYE: CPT | Mod: 26

## 2021-06-08 PROCEDURE — 95720 EEG PHY/QHP EA INCR W/VEEG: CPT

## 2021-06-08 PROCEDURE — 99232 SBSQ HOSP IP/OBS MODERATE 35: CPT | Mod: GC

## 2021-06-08 PROCEDURE — 70543 MRI ORBT/FAC/NCK W/O &W/DYE: CPT | Mod: 26

## 2021-06-08 RX ORDER — FOLIC ACID 0.8 MG
1 TABLET ORAL
Qty: 0 | Refills: 0 | DISCHARGE
Start: 2021-06-08

## 2021-06-08 RX ORDER — TENOFOVIR DISOPROXIL FUMARATE 300 MG/1
1 TABLET, FILM COATED ORAL
Qty: 30 | Refills: 0
Start: 2021-06-08 | End: 2021-07-07

## 2021-06-08 RX ADMIN — TENOFOVIR DISOPROXIL FUMARATE 300 MILLIGRAM(S): 300 TABLET, FILM COATED ORAL at 18:06

## 2021-06-08 RX ADMIN — Medication 1 TABLET(S): at 18:06

## 2021-06-08 RX ADMIN — Medication 1 MILLIGRAM(S): at 18:06

## 2021-06-08 NOTE — PROGRESS NOTE ADULT - SUBJECTIVE AND OBJECTIVE BOX
*******************************************************  Pedro Navarro MD PGY-1  Internal Medicine  [employer] on [employer]  Pager [facility]) [phone] / [phone]  *******************************************************  Patient is a 65y old  Female who presents with a chief complaint of Intraparenchymal Hemorrhage ICH (07 Jun 2021 19:15)      INTERVAL EVENTS (since last progress note)  - EEG negative thus far  - plan for MR brain/orbits at 11:30 AM    SUBJECTIVE / OVERNIGHT EVENTS:  - No acute events overnight. Episode of nausea evaluated by night float physician.  - Patient seen and evaluated at bedside.  - No complaints  - ROS: Denies fevers/chills, headache, SOB at rest, cough, chest pain, palpitations, abdominal pain, nausea/vomiting/diarrhea/constipation, melena/hematochezia, dysuria, and hematuria    ------------------------------------------------------------------------------------------------------------    MEDICATIONS  (STANDING):  folic acid 1 milliGRAM(s) Oral daily  melatonin 3 milliGRAM(s) Oral at bedtime  multivitamin 1 Tablet(s) Oral daily  tenofovir disoproxil fumarate (VIREAD) 300 milliGRAM(s) Oral daily    MEDICATIONS  (PRN):      ------------------------------------------------------------------------------------------------------------    OBJECTIVE:    CAPILLARY BLOOD GLUCOSE        I&O's Summary    Daily     Daily   Weight (kg): 54 (05-29-21 @ 02:43)    PHYSICAL EXAM:  T(F): 97.8, Max: 98.1 (06-07-21 @ 13:03)  HR: 72 (63 - 79)  BP: 120/55 (120/55 - 137/80)  RR: 16 (16 - 16)  SpO2: 99% (98% - 99%)        CONSTITUTIONAL: NAD, well-developed  HEENT: NCAT, EOMI, no scleral icterus, MMM, Mallampati 3  RESPIRATORY/CHEST: Normal respiratory effort; lungs are clear to auscultation bilaterally; no wheezing/crackles  CARDIO: Regular rate, normal S1 and S2, +systolic 2/6 murmur. No rub/gallop; No JVD  VASCULAR: No lower extremity edema; Peripheral pulses are 2+ bilaterally; Capillary refill brisk  ABDOMEN: Soft, nontender to palpation, normoactive bowel sounds, no rebound/guarding; No hepatosplenomegaly  MUSCULOSKELETAL: no joint swelling or tenderness to palpation, full strength all extremities.  EXTREMITIES: hands/feet are warm, and without cyanosis or clubbing  SKIN: no visible rashes, pallor, diaphoresis, or jaundice  NEURO: No focal deficits, moving all extremities; intact sensation/strength x 4 ext; R vision at ~70% per patient  PSYCH: A+O to person, place, and time; affect appropriate; cooperative        ------------------------------------------------------------------------------------------------------------  LABS:                        12.0   5.87  )-----------( 109      ( 08 Jun 2021 06:28 )             35.7     06-08    137  |  106  |  5<L>  ----------------------------<  112<H>  3.7   |  20<L>  |  0.56    Ca    8.6      08 Jun 2021 06:28  Phos  2.8     06-08  Mg     1.7     06-08    TPro  6.8  /  Alb  2.9<L>  /  TBili  2.8<H>  /  DBili  x   /  AST  77<H>  /  ALT  34<H>  /  AlkPhos  93  06-08    PT/INR - ( 08 Jun 2021 06:28 )   PT: 18.3 sec;   INR: 1.64 ratio         PTT - ( 08 Jun 2021 06:28 )  PTT:40.5 sec            RADIOLOGY & ADDITIONAL TESTS:  Results Reviewed:     Imaging Personally Reviewed:  Electrocardiogram Personally Reviewed:      COORDINATION OF CARE:  Care discussed with consultants/other providers and notes reviewed [Y]:   ------------------------------------------------------------------------------------------------------------

## 2021-06-08 NOTE — EEG REPORT - NS EEG TEXT BOX
MENDOZA, GRISELDA [id number]     Study Date: 		06-07 12:27-08:00 6-8-21  x19:09h  --------------------------------------------------------------------------------------------------  History:  CC/ HPI Patient is a 65y old  Female who presents with a chief complaint of Intraparenchymal Hemorrhage ICH (08 Jun 2021 08:51)    MEDICATIONS  (STANDING):  folic acid 1 milliGRAM(s) Oral daily  melatonin 3 milliGRAM(s) Oral at bedtime  multivitamin 1 Tablet(s) Oral daily  tenofovir disoproxil fumarate (VIREAD) 300 milliGRAM(s) Oral daily    --------------------------------------------------------------------------------------------------  Study Interpretation:    [[[Abbreviation Key:  PDR=alpha rhythm/posterior dominant rhythm. A-P=anterior posterior gradient.  Amplitude: ‘very low’:<20; ‘low’:20-50; ‘medium’:; ‘high’:>200uV.  Persistence for periodic/rhythmic patterns (% of epoch) ‘rare’:<1%; ‘occasional’:1-10%; ‘frequent’:10-50%; ‘abundant’:50-90%; ‘continuous’:>90%.  Persistence for sporadic discharges: ‘rare’:<1/hr; ‘occasional’:1/min-1/hr; ‘frequent’:>1/min; ‘abundant’:>1/10 sec.  GRDA=generalized rhythmic delta activity, LRDA=lateralized rhythmic delta activity, TIRDA=temporal intermittent rhythmic delta activity, FIRDA=frontal intermittent rhythmic activity. LPD=PLED=lateralized periodic discharges, GPD=generalized periodic discharges, BiPDs=BiPLEDs=bilateral independent periodic epileptiform discharges, SIRPID=stimulus induced rhythmic, periodic, or ictal appearing discharges.  Modifiers: +F=with fast component, +S=with spike component, +R=with rhythmic component.  S-B=burst suppression pattern.  Max=maximal. N1-drowsy, N2-stage II sleep, N3-slow wave sleep.  HV=hyperventilation, PS=photic stimulation]]]    FINDINGS:  The background was continuous, spontaneously variable and reactive.  During wakefulness, the posteriorly dominant rhythm was up to 7.5-8hz 30uV.    There was diffuse irregular theta and delta activity present.    Sleep Background:  Drowsiness was characterized by fragmentation, attenuation, and slowing of the background activity.    Sleep was characterized by the presence of vertex waves, symmetric spindles, and K-complexes.    Epileptiform Activity:   No interictal epileptiform discharges were present.    Events:  No clinical events were recorded.  No seizures were recorded.    Activation Procedures:   -Hyperventilation was not performed.    -Photic stimulation was not performed.    Artifacts:  Intermittent myogenic and external motion artifacts were noted.    ECG:  The heart rate on single channel ECG at baseline was predominantly near BPM = 70-90  -----------------------------------------------------------------------------------------------------    EEG Classification / Summary:  Abnormal EEG study  Mild background slowing    -----------------------------------------------------------------------------------------------------    Clinical Impression:  Mild diffuse or multifocal cerebral dysfunction, not specific as to etiology.  There were no epileptiform abnormalities recorded.      -------------------------------------------------------------------------------------------------------  [facility] EEG Reading Room Ph#: [phone]  Epilepsy Answering Service after 5PM and before 8:30AM: Ph#: [phone]    Julián Fontaine M.D.   of Neurology, [facility] Epilepsy [place]

## 2021-06-08 NOTE — PROGRESS NOTE ADULT - PROBLEM SELECTOR PLAN 1
Patient with 2.4 x 3.0 x 2.4 cm left occipital acute intraparenchymal hemorrhage with associated vasogenic edema and mass effect, no midline shift. CTA head/neck 5 hrs later with unchanged left occipital acute intraparenchymal hemorrhage. No active bleeding. Imaging and exam stable. Etiology at this stage is unknown. DDx includes metastatic malignancy, hemorrhagic stroke, amyloid angiopathy, severe coagulopathy c/b spontaneous ICH.  - S/p PO vit K x3 INR currently 1.6  - Platelets goal >100 currently 109  - Neuro following, will determine the need for steroid after MRI brain and orbits  - Pending MRI brain and orbits +/- contrast for stroke workup and rule out underlying lesion - will require anesthesia given patient's claustrophobia. Will f/u regarding timing of MR (likely 6/8)  - EEG negative

## 2021-06-08 NOTE — CHART NOTE - NSCHARTNOTEFT_GEN_A_CORE
65year oldF no significant PMH with months of weight loss, presented with HA for 3 days, abdominal pain and change in colour of sclera.  CTH with 2.3 cm IPH in L occipital lobe. MRI was done to rule out underlying lesion. MRI reviewed. No acute neurosurgical intervention at this time. neurosurgery signing off. Reconsult PRN    < from: MR Head w/wo IV Cont (06.08.21 @ 14:06) >  IMPRESSION:  Essentially stable in size intraparenchymal hemorrhage in the left occipital lobe with perihemorrhagic edema. There is no midline shift or hydrocephalus.  Presence of intrinsic T1 products impair accurate characterization for underlying lesions and follow-up with MRI of the brain with normal contrast suggested in 4-6 weeks upon resolution of blood products is suggested. No other parenchymal enhancing lesion, dural or leptomeningeal enhancement.  The MRI evaluation of the orbits is unremarkable.  < end of copied text >

## 2021-06-08 NOTE — CHART NOTE - NSCHARTNOTESELECT_GEN_ALL_CORE
Neurosurgery/Event Note
Transfer Note
EEG report
Event Note
Follow-Up/Nutrition Services
MAR accept note/Transfer Note
Neurology

## 2021-06-08 NOTE — PROGRESS NOTE ADULT - ATTENDING COMMENTS
65F with no known PMH who presented to the ED with headache and vision changes, found to have an intraparenchymal hemorrhage on CTH and indeterminate infiltrative liver mass, now thought to be diffusion abnormality in setting of cirrhosis with evidence of coagulopathy secondary to liver dysfunction.     # ICH - left occipital hemorrhage with vasogenic edema in the left parietal-occipital region stable on repeat CT. EEG with mild slowing and no seizure. Planned for MRI brain/orbits with anesthesia (severe claustrophobia) today.   # Liver Mass - HBV antigen positive, HBV viral load positive. MRI liver not consistent with HCC, but evidence of early cirrhosis. Started on tenofovir 300 daily. Hepatology is recommending OP follow up.   # Coagulapathy # Thrombocytopenia- secondary to liver dysfunction. INR continues to improve. Monitor CBC for platelets and INR.  # Pulmonary Nodules - scattered subcentimeter lung nodules, if above is negative can follow up at outpatient.   Dispo: likely discharge home tomorrow pending MRI    Discussed with patient, daughter and HS5

## 2021-06-09 ENCOUNTER — TRANSCRIPTION ENCOUNTER (OUTPATIENT)
Age: 66
End: 2021-06-09

## 2021-06-09 VITALS
HEART RATE: 74 BPM | RESPIRATION RATE: 17 BRPM | DIASTOLIC BLOOD PRESSURE: 67 MMHG | OXYGEN SATURATION: 98 % | TEMPERATURE: 98 F | SYSTOLIC BLOOD PRESSURE: 112 MMHG

## 2021-06-09 LAB
ALBUMIN SERPL ELPH-MCNC: 3 G/DL — LOW (ref 3.3–5)
ALP SERPL-CCNC: 95 U/L — SIGNIFICANT CHANGE UP (ref 40–120)
ALT FLD-CCNC: 39 U/L — HIGH (ref 4–33)
ANION GAP SERPL CALC-SCNC: 12 MMOL/L — SIGNIFICANT CHANGE UP (ref 7–14)
AST SERPL-CCNC: 88 U/L — HIGH (ref 4–32)
BILIRUB SERPL-MCNC: 2.4 MG/DL — HIGH (ref 0.2–1.2)
BUN SERPL-MCNC: 7 MG/DL — SIGNIFICANT CHANGE UP (ref 7–23)
CALCIUM SERPL-MCNC: 8.7 MG/DL — SIGNIFICANT CHANGE UP (ref 8.4–10.5)
CHLORIDE SERPL-SCNC: 106 MMOL/L — SIGNIFICANT CHANGE UP (ref 98–107)
CO2 SERPL-SCNC: 20 MMOL/L — LOW (ref 22–31)
CREAT SERPL-MCNC: 0.6 MG/DL — SIGNIFICANT CHANGE UP (ref 0.5–1.3)
GLUCOSE SERPL-MCNC: 114 MG/DL — HIGH (ref 70–99)
HCT VFR BLD CALC: 36.5 % — SIGNIFICANT CHANGE UP (ref 34.5–45)
HGB BLD-MCNC: 12.3 G/DL — SIGNIFICANT CHANGE UP (ref 11.5–15.5)
MAGNESIUM SERPL-MCNC: 1.8 MG/DL — SIGNIFICANT CHANGE UP (ref 1.6–2.6)
MCHC RBC-ENTMCNC: 32.8 PG — SIGNIFICANT CHANGE UP (ref 27–34)
MCHC RBC-ENTMCNC: 33.7 GM/DL — SIGNIFICANT CHANGE UP (ref 32–36)
MCV RBC AUTO: 97.3 FL — SIGNIFICANT CHANGE UP (ref 80–100)
NRBC # BLD: 0 /100 WBCS — SIGNIFICANT CHANGE UP
NRBC # FLD: 0 K/UL — SIGNIFICANT CHANGE UP
PHOSPHATE SERPL-MCNC: 3 MG/DL — SIGNIFICANT CHANGE UP (ref 2.5–4.5)
PLATELET # BLD AUTO: 116 K/UL — LOW (ref 150–400)
POTASSIUM SERPL-MCNC: 3.7 MMOL/L — SIGNIFICANT CHANGE UP (ref 3.5–5.3)
POTASSIUM SERPL-SCNC: 3.7 MMOL/L — SIGNIFICANT CHANGE UP (ref 3.5–5.3)
PROT SERPL-MCNC: 7.1 G/DL — SIGNIFICANT CHANGE UP (ref 6–8.3)
RBC # BLD: 3.75 M/UL — LOW (ref 3.8–5.2)
RBC # FLD: 14.9 % — HIGH (ref 10.3–14.5)
SODIUM SERPL-SCNC: 138 MMOL/L — SIGNIFICANT CHANGE UP (ref 135–145)
WBC # BLD: 6.2 K/UL — SIGNIFICANT CHANGE UP (ref 3.8–10.5)
WBC # FLD AUTO: 6.2 K/UL — SIGNIFICANT CHANGE UP (ref 3.8–10.5)

## 2021-06-09 PROCEDURE — 99239 HOSP IP/OBS DSCHRG MGMT >30: CPT

## 2021-06-09 RX ADMIN — TENOFOVIR DISOPROXIL FUMARATE 300 MILLIGRAM(S): 300 TABLET, FILM COATED ORAL at 12:33

## 2021-06-09 RX ADMIN — Medication 1 MILLIGRAM(S): at 12:32

## 2021-06-09 RX ADMIN — Medication 1 TABLET(S): at 12:32

## 2021-06-09 NOTE — DISCHARGE NOTE NURSING/CASE MANAGEMENT/SOCIAL WORK - NSDCFUADDAPPT_GEN_ALL_CORE_FT
You can call the following number: [phone] to find a primary care physician within [facility] in your area. Please try to see them within the next two weeks if possible.

## 2021-06-09 NOTE — PROGRESS NOTE ADULT - PROBLEM SELECTOR PROBLEM 2
Liver mass

## 2021-06-09 NOTE — PROGRESS NOTE ADULT - PROBLEM SELECTOR PROBLEM 1
Intracranial hemorrhage

## 2021-06-09 NOTE — PROGRESS NOTE ADULT - PROBLEM SELECTOR PLAN 5
DVT ppx: SCDs. Holding heparin in setting of ICH  Diet: Regular  Disposition: home, no PT  Code Status: FULL CODE

## 2021-06-09 NOTE — PROGRESS NOTE ADULT - PROBLEM SELECTOR PLAN 2
CTAP with Indeterminate infiltrative liver mass. h/o alcohol use. CT revealed pulmonary nodules, liver abnormality, and possible hemorrhagic brain mets. MR showing heterogeneous enhancement in R hepatic lobe concerning for perfusion limitation - not diagnostic for HCC. Acute hepatitis panel concerning for HBV (see below)  -no mass - likely perfusion abnormality represents hepatic congestion/cirrhosis 2/2 alcohol use + chronic HBV  -can undergo vibration cirrhosis testing as outpatient  -MELD 17  -repeat MRI in 6 months

## 2021-06-09 NOTE — PROGRESS NOTE ADULT - NUTRITIONAL ASSESSMENT
This patient has been assessed with a concern for Malnutrition and has been determined to have a diagnosis/diagnoses of Severe protein-calorie malnutrition.    This patient is being managed with:   Diet NPO after Midnight-     NPO Start Date: 02-Jun-2021   NPO Start Time: 23:59  Except Medications  Entered: Jun 2 2021 10:40AM    Diet Regular-  Entered: May 29 2021  6:49AM    
This patient has been assessed with a concern for Malnutrition and has been determined to have a diagnosis/diagnoses of Severe protein-calorie malnutrition.    This patient is being managed with:   Diet Regular-  Entered: May 29 2021  6:49AM    
This patient has been assessed with a concern for Malnutrition and has been determined to have a diagnosis/diagnoses of Severe protein-calorie malnutrition.    This patient is being managed with:   Diet NPO after Midnight-     NPO Start Date: 07-Jun-2021   NPO Start Time: 23:59  Except Medications  Entered: Jun 7 2021 10:58AM    Diet Regular-  Entered: May 29 2021  6:49AM    
This patient has been assessed with a concern for Malnutrition and has been determined to have a diagnosis/diagnoses of Severe protein-calorie malnutrition.    This patient is being managed with:   Diet NPO after Midnight-     NPO Start Date: 07-Jun-2021   NPO Start Time: 23:59  Except Medications  Entered: Jun 7 2021 10:58AM    Diet Regular-  Entered: May 29 2021  6:49AM    
This patient has been assessed with a concern for Malnutrition and has been determined to have a diagnosis/diagnoses of Severe protein-calorie malnutrition.    This patient is being managed with:   Diet Regular-  Entered: May 29 2021  6:49AM    
This patient has been assessed with a concern for Malnutrition and has been determined to have a diagnosis/diagnoses of Severe protein-calorie malnutrition.    This patient is being managed with:   Diet Regular-  Entered: May 29 2021  6:49AM    
This patient has been assessed with a concern for Malnutrition and has been determined to have a diagnosis/diagnoses of Severe protein-calorie malnutrition.    This patient is being managed with:   Diet NPO after Midnight-     NPO Start Date: 01-Jun-2021   NPO Start Time: 23:59  Except Medications  Entered: Jun 1 2021  9:05AM    Diet Regular-  Entered: May 29 2021  6:49AM    
This patient has been assessed with a concern for Malnutrition and has been determined to have a diagnosis/diagnoses of Severe protein-calorie malnutrition.    This patient is being managed with:   Diet Regular-  Entered: May 29 2021  6:49AM    

## 2021-06-09 NOTE — PROGRESS NOTE ADULT - SUBJECTIVE AND OBJECTIVE BOX
*******************************************************  Pedro Navarro MD PGY-1  Internal Medicine  Availble on [employer] Teams  Pager [facility]) [phone] / [phone]  *******************************************************  Patient is a 65y old  Female who presents with a chief complaint of Intraparenchymal Hemorrhage ICH (08 Jun 2021 08:51)      INTERVAL EVENTS (since last progress note)  - MRI brain/orbits with stable ICH in L occipital lobe. No masses noted although degrading blood with high T1 signal therefore definitive dx cannot be established.      ------------------------------------------------------------------------------------------------------------    MEDICATIONS  (STANDING):  folic acid 1 milliGRAM(s) Oral daily  melatonin 3 milliGRAM(s) Oral at bedtime  multivitamin 1 Tablet(s) Oral daily  tenofovir disoproxil fumarate (VIREAD) 300 milliGRAM(s) Oral daily    ------------------------------------------------------------------------------------------------------------    PHYSICAL EXAM:  T(F): 98.5, Max: 98.5 (06-09-21 @ 05:30)  HR: 78 (73 - 78)  BP: 121/60 (103/65 - 124/70)  RR: 18 (17 - 18)  SpO2: 98% (98% - 100%)      ------------------------------------------------------------------------------------------------------------  LABS:                        12.3   6.20  )-----------( 116      ( 09 Jun 2021 06:46 )             36.5     06-09    138  |  106  |  7   ----------------------------<  114<H>  3.7   |  20<L>  |  0.60    Ca    8.7      09 Jun 2021 06:46  Phos  3.0     06-09  Mg     1.8     06-09    TPro  7.1  /  Alb  3.0<L>  /  TBili  2.4<H>  /  DBili  x   /  AST  88<H>  /  ALT  39<H>  /  AlkPhos  95  06-09    PT/INR - ( 08 Jun 2021 06:28 )   PT: 18.3 sec;   INR: 1.64 ratio         PTT - ( 08 Jun 2021 06:28 )  PTT:40.5 sec            RADIOLOGY & ADDITIONAL TESTS:  Results Reviewed:     Imaging Personally Reviewed:    < from: MR Head w/wo IV Cont (06.08.21 @ 14:06) >  IMPRESSION:    Essentially stable in size intraparenchymal hemorrhage in the left occipital lobe with perihemorrhagic edema. There is no midline shift or hydrocephalus.    Presence of intrinsic T1 products impair accurate characterization for underlying lesions and follow-up with MRI of the brain with normal contrast suggested in 4-6 weeks upon resolution of blood products is suggested. No other parenchymal enhancing lesion, dural or leptomeningeal enhancement.    The MRI evaluation of the orbits is unremarkable.    < end of copied text >    Electrocardiogram Personally Reviewed:      COORDINATION OF CARE:  Care discussed with consultants/other providers and notes reviewed [Y]:   ------------------------------------------------------------------------------------------------------------     *******************************************************  Pedro Navarro MD PGY-1  Internal Medicine  Av[employer] on [employer] Teams  Pager [facility]) [phone] / [phone]  *******************************************************  Patient is a 65y old  Female who presents with a chief complaint of Intraparenchymal Hemorrhage ICH (08 Jun 2021 08:51)      INTERVAL EVENTS (since last progress note)  - MRI brain/orbits with stable ICH in L occipital lobe. No masses noted although degrading blood with high T1 signal therefore definitive dx cannot be established.    OVERNIGHT/SUBJECTIVE  Patient seen and examined at bedside. No acute events overnight. Denies fevers/chills, headache, chest pain, palpitations, shortness of breath, cough, abdominal pain, nausea/vomiting, diarrhea, constipation, dysuria/hematuria, lower extremity swelling, joint pain, and rashes.     ------------------------------------------------------------------------------------------------------------    MEDICATIONS  (STANDING):  folic acid 1 milliGRAM(s) Oral daily  melatonin 3 milliGRAM(s) Oral at bedtime  multivitamin 1 Tablet(s) Oral daily  tenofovir disoproxil fumarate (VIREAD) 300 milliGRAM(s) Oral daily    ------------------------------------------------------------------------------------------------------------    PHYSICAL EXAM:  T(F): 98.5, Max: 98.5 (06-09-21 @ 05:30)  HR: 78 (73 - 78)  BP: 121/60 (103/65 - 124/70)  RR: 18 (17 - 18)  SpO2: 98% (98% - 100%)    CONSTITUTIONAL: NAD, well-developed  HEENT: NCAT, EOMI, no scleral icterus, MMM, Mallampati 3  RESPIRATORY/CHEST: Normal respiratory effort; lungs are clear to auscultation bilaterally; no wheezing/crackles  CARDIO: Regular rate, normal S1 and S2, +systolic 2/6 murmur. No rub/gallop; No JVD  VASCULAR: No lower extremity edema; Peripheral pulses are 2+ bilaterally; Capillary refill brisk  ABDOMEN: Soft, nontender to palpation, normoactive bowel sounds, no rebound/guarding; No hepatosplenomegaly  MUSCULOSKELETAL: no joint swelling or tenderness to palpation, full strength all extremities.  EXTREMITIES: hands/feet are warm, and without cyanosis or clubbing  SKIN: no visible rashes, pallor, diaphoresis, or jaundice  NEURO: No focal deficits, moving all extremities; intact sensation/strength x 4 ext; R vision at ~70% per patient. VF to confrontation intact  PSYCH: A+O to person, place, and time; affect appropriate; cooperative  ------------------------------------------------------------------------------------------------------------  LABS:                        12.3   6.20  )-----------( 116      ( 09 Jun 2021 06:46 )             36.5     06-09    138  |  106  |  7   ----------------------------<  114<H>  3.7   |  20<L>  |  0.60    Ca    8.7      09 Jun 2021 06:46  Phos  3.0     06-09  Mg     1.8     06-09    TPro  7.1  /  Alb  3.0<L>  /  TBili  2.4<H>  /  DBili  x   /  AST  88<H>  /  ALT  39<H>  /  AlkPhos  95  06-09    PT/INR - ( 08 Jun 2021 06:28 )   PT: 18.3 sec;   INR: 1.64 ratio         PTT - ( 08 Jun 2021 06:28 )  PTT:40.5 sec            RADIOLOGY & ADDITIONAL TESTS:  Results Reviewed:     Imaging Personally Reviewed:    < from: MR Head w/wo IV Cont (06.08.21 @ 14:06) >  IMPRESSION:    Essentially stable in size intraparenchymal hemorrhage in the left occipital lobe with perihemorrhagic edema. There is no midline shift or hydrocephalus.    Presence of intrinsic T1 products impair accurate characterization for underlying lesions and follow-up with MRI of the brain with normal contrast suggested in 4-6 weeks upon resolution of blood products is suggested. No other parenchymal enhancing lesion, dural or leptomeningeal enhancement.    The MRI evaluation of the orbits is unremarkable.    < end of copied text >    Electrocardiogram Personally Reviewed:      COORDINATION OF CARE:  Care discussed with consultants/other providers and notes reviewed [Y]:   ------------------------------------------------------------------------------------------------------------

## 2021-06-09 NOTE — EEG REPORT - NS EEG TEXT BOX
[facility]  Comprehensive Epilepsy Center  Report of Continuous Video EEG    [facility]: [address], [place] [address], Phone [phone]  [facility]: [address] [place], [place] [address] [address], Phone [phone]  [place] [facility]: [address], [address], [place] [address] Phone [phone]    [facility]: [address] [place] [address], Phone [phone]  [place] [facility]: [address] [place] [address], Phone [phone]    Patient Name: MENDOZA , GRISELDA    Age: 65 year, : 1955  Patient ID: -, MRN #: -, Peterson: [id number]  Referring Physician: ROSSI YEE  EEG #: [id number]    Study Time/Date: 8:00:17 AM on 2021  	  End Time/Date: 10:43 AM on 2021          			   Duration: 163.4m    Study Information:    EEG Recording Technique:  The patient underwent continuous Video-EEG monitoring, using Telemetry System hardware on the XLTek Digital System. EEG and video data were stored on a computer hard drive with important events saved in digital archive files. The material was reviewed by a physician (electroencephalographer / epileptologist) on a daily basis. Sixto and seizure detection algorithms were utilized and reviewed. An EEG Technician attended to the patient, and was available throughout daytime work hours.  The epilepsy center neurologist was available in person or on call 24-hours per day.    EEG Placement and Labeling of Electrodes:  The EEG was performed utilizing 20 channel referential EEG connections (coronal over temporal over parasagittal montage) using all standard 10-20 electrode placements with EKG, with additional electrodes placed in the inferior temporal region using the modified 10-10 montage electrode placements for elective admissions, or if deemed necessary. Recording was at a sampling rate of 256 samples per second per channel. Time synchronized digital video recording was done simultaneously with EEG recording. A low light infrared camera was used for low light recording.     History:   F7, F8 MOVED 1CM    VIDEO EEG DAY---> 2    CONCERN FOR SZ    Pertinent Medication  No Data.    Interpretation:    Daily EEG Visual Analysis  The background was continuous, spontaneously variable and reactive.  During wakefulness, the posteriorly dominant rhythm was up to 7 – 8 hz 30uV.  There was diffuse irregular theta and delta activity present.    Sleep Background:  Drowsiness was characterized by fragmentation, attenuation, and slowing of the background activity.      Epileptiform Activity:   No interictal epileptiform discharges were present.    Events:  No clinical events were recorded.  No seizures were recorded.    Activation Procedures:   -Hyperventilation was not performed.    -Photic stimulation was not performed.    Artifacts:  Intermittent myogenic and external motion artifacts were noted.    ECG:  The heart rate on single channel ECG at baseline was predominantly near BPM = 80-90  -----------------------------------------------------------------------------------------------------    EEG Classification / Summary:  Abnormal EEG study  Mild background slowing    -----------------------------------------------------------------------------------------------------  **PRELIM READ – pending fellow/attending read    Clinical Impression:  Mild diffuse or multifocal cerebral dysfunction, not specific as to etiology.  There were no epileptiform abnormalities recorded.      _________________________________________________________________________________________    Kasey Santos MD. Neurology Resident [facility]  Comprehensive Epilepsy Center  Report of Continuous Video EEG    [facility]: [address], [place] [address], Phone [phone]  [facility]: [address] [place], [place] [address] [address], Phone [phone]  [place] [facility]: [address], [address], [place] [address] Phone [phone]    [facility]: [address] [place] [address], Phone [phone]  [place] [facility]: [address] [place] [address], Phone [phone]    Patient Name: MENDOZA , GRISELDA    Age: 65 year, : 1955  Patient ID: -, MRN #: -, Peterson: [id number]  Referring Physician: ROSSI YEE  EEG #: [id number]    Study Time/Date: 8:00:17 AM on 2021  	  End Time/Date: 10:43 AM on 2021          			   Duration: 163.4m    Study Information:    EEG Recording Technique:  The patient underwent continuous Video-EEG monitoring, using Telemetry System hardware on the XLTek Digital System. EEG and video data were stored on a computer hard drive with important events saved in digital archive files. The material was reviewed by a physician (electroencephalographer / epileptologist) on a daily basis. Sixto and seizure detection algorithms were utilized and reviewed. An EEG Technician attended to the patient, and was available throughout daytime work hours.  The epilepsy center neurologist was available in person or on call 24-hours per day.    EEG Placement and Labeling of Electrodes:  The EEG was performed utilizing 20 channel referential EEG connections (coronal over temporal over parasagittal montage) using all standard 10-20 electrode placements with EKG, with additional electrodes placed in the inferior temporal region using the modified 10-10 montage electrode placements for elective admissions, or if deemed necessary. Recording was at a sampling rate of 256 samples per second per channel. Time synchronized digital video recording was done simultaneously with EEG recording. A low light infrared camera was used for low light recording.     History:   F7, F8 MOVED 1CM    VIDEO EEG DAY---> 2    CONCERN FOR SZ    Pertinent Medication  No Data.    Interpretation:    Daily EEG Visual Analysis  The background was continuous, spontaneously variable and reactive.  During wakefulness, the posteriorly dominant rhythm was up to 7 – 8 hz 30uV.  There was diffuse irregular theta and delta activity present.    Sleep Background:  Drowsiness was characterized by fragmentation, attenuation, and slowing of the background activity.    Sleep was characterized by the presence of symmetric spindles, and K-complexes.    Epileptiform Activity:   No interictal epileptiform discharges were present.    Events:  No clinical events were recorded.  No seizures were recorded.    Activation Procedures:   -Hyperventilation was not performed.    -Photic stimulation was not performed.    Artifacts:  Intermittent myogenic and external motion artifacts were noted.    ECG:  The heart rate on single channel ECG at baseline was predominantly near BPM = 80-90  -----------------------------------------------------------------------------------------------------    EEG Classification / Summary:  Abnormal EEG study  Mild background slowing    -----------------------------------------------------------------------------------------------------  **PRELIM READ – pending fellow/attending read    Clinical Impression:  Mild diffuse or multifocal cerebral dysfunction, not specific as to etiology.  There were no epileptiform abnormalities recorded.      _________________________________________________________________________________________    Kasey Santos MD. Neurology Resident [facility]  Comprehensive Epilepsy Center  Report of Continuous Video EEG    [facility]: [address], [place] [address], Phone [phone]  [facility]: [address] [place] [address], Phone [phone]  [place] Office: [address], [address], [place] [address] Phone [phone]    [facility]: [address] [place] [address], Phone [phone]  [place] Office: [address] [place] [address], Phone [phone]    Patient Name: MENDOZA , GRISELDA    Age: 65 year, : 1955  Patient ID: -, MRN #: -, Peterson: [id number]  Referring Physician: ROSSI YEE  EEG #: [id number]    Study Time/Date: 8:00:17 AM on 2021  	  End Time/Date: 10:43 AM on 2021          			   Duration: 163.4m    Study Information:    EEG Recording Technique:  The patient underwent continuous Video-EEG monitoring, using Telemetry System hardware on the XLTek Digital System. EEG and video data were stored on a computer hard drive with important events saved in digital archive files. The material was reviewed by a physician (electroencephalographer / epileptologist) on a daily basis. Sixto and seizure detection algorithms were utilized and reviewed. An EEG Technician attended to the patient, and was available throughout daytime work hours.  The epilepsy center neurologist was available in person or on call 24-hours per day.    EEG Placement and Labeling of Electrodes:  The EEG was performed utilizing 20 channel referential EEG connections (coronal over temporal over parasagittal montage) using all standard 10-20 electrode placements with EKG, with additional electrodes placed in the inferior temporal region using the modified 10-10 montage electrode placements for elective admissions, or if deemed necessary. Recording was at a sampling rate of 256 samples per second per channel. Time synchronized digital video recording was done simultaneously with EEG recording. A low light infrared camera was used for low light recording.     History:  CONCERN FOR SZ    Interpretation:    Daily EEG Visual Analysis  The background was continuous, spontaneously variable and reactive.  During wakefulness, the posteriorly dominant rhythm was up to 7 – 8 hz 30uV.  There was diffuse irregular theta and delta activity present.    Sleep Background:  Drowsiness was characterized by fragmentation, attenuation, and slowing of the background activity.    Sleep was characterized by the presence of symmetric spindles, and K-complexes.    Epileptiform Activity:   No interictal epileptiform discharges were present.    Events:  No clinical events were recorded.  No seizures were recorded.    Activation Procedures:   -Hyperventilation was not performed.    -Photic stimulation was not performed.    Artifacts:  Intermittent myogenic and external motion artifacts were noted.    ECG:  The heart rate on single channel ECG at baseline was predominantly near BPM = 70-80  -----------------------------------------------------------------------------------------------------    EEG Classification / Summary:  Abnormal EEG study  Mild background slowing    -----------------------------------------------------------------------------------------------------    Clinical Impression:  Mild diffuse or multifocal cerebral dysfunction, not specific as to etiology.  There were no epileptiform abnormalities recorded.      _________________________________________________________________________________________    Kasey Santos MD. Neurology Resident

## 2021-06-09 NOTE — PROGRESS NOTE ADULT - ASSESSMENT
65F with no known PMH who presented to the ED with headache and vision changes, found to have an intraparenchymal hemorrhage on CTH and indeterminate infiltrative liver mass concerning for metastatic neoplasm, s/p MICU stay for q1 hour neuro checks. 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 MR abdomen not diagnostic for HCC. MR brain and orbits with redomonstration of IPH, no visible mass.

## 2021-06-09 NOTE — PROGRESS NOTE ADULT - PROBLEM SELECTOR PROBLEM 4
Alcohol abuse
Prophylactic measure
Alcohol abuse
Prophylactic measure
Alcohol abuse

## 2021-06-09 NOTE — PROGRESS NOTE ADULT - PROBLEM SELECTOR PROBLEM 5
Prophylactic measure
Discharge planning issues
Prophylactic measure
Discharge planning issues
Prophylactic measure

## 2021-06-09 NOTE — PROGRESS NOTE ADULT - ATTENDING COMMENTS
65F with no known PMH who presented to the ED with headache and vision changes, found to have an intraparenchymal hemorrhage on CTH and indeterminate infiltrative liver mass, now thought to be diffusion abnormality in setting of cirrhosis with evidence of coagulopathy secondary to liver dysfunction.     # ICH - left occipital hemorrhage with vasogenic edema in the left parietal-occipital region stable on repeat CT. EEG with mild slowing and no seizure. MRI brain/orbits showed stable in size intraparenchymal hemorrhage, but cannot rule out underlying lesion, recommending repeat MRI in 4-6 weeks   # Liver Mass - HBV antigen positive, HBV viral load positive. MRI liver not consistent with HCC, but evidence of early cirrhosis. Started on tenofovir 300 daily. Hepatology is recommending OP follow up.   # Coagulapathy # Thrombocytopenia- secondary to liver dysfunction. INR continues to improve. Monitor CBC for platelets and INR.  # Pulmonary Nodules - scattered subcentimeter lung nodules, if above is negative can follow up at outpatient.   Dispo: discharge home today, d/c planning 44 min    Discussed with patient and HS5

## 2021-06-09 NOTE — PROGRESS NOTE ADULT - PROBLEM/PLAN-1
Chief complaint:   Chief Complaint   Patient presents with   • Psychiatric Problem       Vitals:  Visit Vitals  /72   Pulse 64   Resp 18   Ht 5' 2\" (1.575 m)   Wt 110.2 kg Comment: 243lb   Breastfeeding? No   BMI 44.45 kg/m²       HISTORY OF PRESENT ILLNESS     Patient comes for follow-up today. She reports \"I'm doing a lot better mood wise but I will be honest, I am eating a lot. I don't like that. I can eat a whole bag of trail mix on my own each day. And I love Mountain Dew. \"The patient states \"I will be honest, I always loved Mountain Dew but I'm angry because I'm gaining weight now. \"She states that she seems to be sleeping somewhat better at night now. She is reporting that \"I am always been a worrier. \"She states that she has been also thinking \"all throughout the day what I could potentially eat. \"She acknowledges she drinks very little water \"I really don't like it. \"She states she seems to overall be sleeping better at night although at times it does not seem to be a restful sleep. Work is going better. Baby sleeps through the night. She reports that she is not sure how long she has been eating more, likely through the beginning of the summer from what she is saying. She also started the Chantix last week. So far it seems to be going well. Finds herself easily irritated in general though she is not sure when that started.        Other significant problems:  Patient Active Problem List    Diagnosis Date Noted   • Bipolar I disorder, most recent episode depressed, severe without psychotic features (CMS/[facility]) 11/23/2016     Priority: Low   • Vitamin D deficiency 02/15/2016     Priority: Low   • Tobacco dependence      Priority: Low   • Eczema      Priority: Low   • Supervision of normal first pregnancy 09/18/2013     Priority: Low   • Primary HSV infection 05/07/2013     Priority: Low     Class: Current Pregnancy     Initial outbreak during this pregnancy--referral to Dr Feliciano Beatty, amnisure 
negative on 9/15/13.  Valtrex to 1000mg bid.  Follow up with Dr. daniels this week     • Bipolar affective disorder (CMS/HCC) 01/23/2013     Priority: Low     Has been on Abilify and Lamictal in past. Did not follow up with Chris vila and has been off meds for 9-12 months.     • Depression      Priority: Low       PAST MEDICAL, FAMILY AND SOCIAL HISTORY     Medications:  Current Outpatient Prescriptions   Medication   • varenicline (CHANTIX STARTING MONTH PAK) 0.5 MG X 11 & 1 MG X 42 tablet   • albuterol 108 (90 Base) MCG/ACT inhaler   • buPROPion (WELLBUTRIN XL) 300 MG 24 hr tablet   • loxapine (LOXITANE) 5 MG capsule   • busPIRone (BUSPAR) 5 MG tablet   • Lidocaine 2 % Gel   • Prenatal-FeCbn-FeAspGl-FA-Omeg (ULTIMATECARE ONE) 27-1 MG Cap   • acyclovir (ZOVIRAX) 5 % Cream     No current facility-administered medications for this visit.        Allergies:  ALLERGIES:   Allergen Reactions   • Lithium Other (See Comments)     suicidal   • Shellfish Allergy Other (See Comments)     Per pt swelling stated her throat felt like it was closing    • Vicodin [Hydrocodone-Acetaminophen] NAUSEA       Past Medical  History/Surgeries:  Past Medical History:   Diagnosis Date   • ADHD (attention deficit hyperactivity disorder)    • Anxiety    • Bipolar depression (CMS/HCC)     off meds 9-12 months   • Eczema    • Fatigue    • Herpes simplex without mention of complication 2013   • Tobacco dependence        Past Surgical History:   Procedure Laterality Date   • HB ETONOGESTREL (NEXPLANON) IMPLANT Left 06/19/2017    lot [id number]       Family History:  Family History   Problem Relation Age of Onset   • Alcohol/Drug Mother      alcohol abuse   • Bipolar disorder Mother    • Depression Mother    • Asthma Brother    • Cancer Maternal Grandmother      breast   • OTHER Paternal Grandfather      liver issues/ ??from alcohol   • Psychiatry Maternal Aunt      anxiety   • Diabetes Other      half sister       Social History:  Social History 
  Substance Use Topics   • Smoking status: Former Smoker     Packs/day: 1.00     Types: Cigarettes     Quit date: 9/30/2017   • Smokeless tobacco: Never Used      Comment: refused quit line info   • Alcohol use No       REVIEW OF SYSTEMS     Review of Systems   Constitutional: Positive for fatigue.   HENT: Negative.    Eyes: Negative.    Respiratory: Negative.    Cardiovascular: Negative.    Gastrointestinal: Negative.    Endocrine: Negative.    Genitourinary: Negative.    Musculoskeletal: Negative.    Skin: Negative.    Allergic/Immunologic: Negative.    Neurological: Negative.    Hematological: Negative.    Psychiatric/Behavioral:        See History of Present Illness         PHYSICAL EXAM     Physical Exam   Constitutional: She is oriented to person, place, and time. She appears well-developed and well-nourished.   Neurological: She is alert and oriented to person, place, and time. Gait normal.   Psychiatric: She has a normal mood and affect. Her behavior is normal. Judgment and thought content normal. Her speech is not rapid and/or pressured. Thought content is not paranoid and not delusional. Cognition and memory are normal. She expresses no homicidal and no suicidal ideation. She expresses no suicidal plans and no homicidal plans.   Language: average  Fund of knowledge: average  Associations: intact     Nursing note and vitals reviewed.      ASSESSMENT/PLAN     This is a 22 year old female who comes for follow up today for   1. Bipolar I disorder, most recent episode (or current) depressed (CMS/HCC)    2. BEVERLY (generalized anxiety disorder)    3. Tobacco dependence        I discussed with the patient that I'm hesitant to change medications at this time given the fact that she reports she is feeling \"much better \"for mood and anxiety perspective. I explained to her that Wellbutrin could potentially increased cravings for food but from what she is describing, she is drinking very little water throughout the day. 
DISPLAY PLAN FREE TEXT
I want her to increase the water intake as this should help with some of the cravings in addition, they suggested to her she is going to the Rowena mix, to measure no more than a half cup to eat per day as that could be a very high calorie food. I will see her back in 4-6 weeks. I also reminded her that quitting smoking can increased irritability at least temporarily but I congratulated her on quitting.    Medical Records/Labs/Diagnostics Reviewed: Reviewed PCP and therapy notes    Co morbidities:  See above    Orders Placed This Encounter   • busPIRone (BUSPAR) 5 MG tablet         Updated Consents Due: January     
DISPLAY PLAN FREE TEXT

## 2021-06-09 NOTE — PROGRESS NOTE ADULT - PROBLEM SELECTOR PROBLEM 3
Hepatitis B
Alcohol abuse
Hepatitis B
Alcohol abuse
Hepatitis B

## 2021-06-09 NOTE — PROGRESS NOTE ADULT - NSICDXPILOT_GEN_ALL_CORE
[place]

## 2021-06-09 NOTE — PROGRESS NOTE ADULT - PROBLEM SELECTOR PLAN 4
Patient denied heavy ETOH use, but daughter reports patient is drinking heavily  - C/w thiamine, folic acid, multivitamin

## 2021-06-09 NOTE — PROGRESS NOTE ADULT - PROVIDER SPECIALTY LIST ADULT
Hepatology
Internal Medicine
Neurology
Internal Medicine
Hepatology
Internal Medicine
Hepatology
Hepatology
Internal Medicine
Neurology
Neurology
Internal Medicine
MICU
Neurology
Neurology
Internal Medicine

## 2021-06-09 NOTE — PROGRESS NOTE ADULT - REASON FOR ADMISSION
Intraparenchymal Hemorrhage ICH

## 2021-06-09 NOTE — PROGRESS NOTE ADULT - ATTENDING SUPERVISION STATEMENT
Fellow
Resident

## 2021-06-09 NOTE — PROGRESS NOTE ADULT - PROBLEM SELECTOR PLAN 3
Hepatitis panel with + HBsAg, -HBsAb, -HBcAb IgM, +HBcAb IgG, -HBeAg, +HBeAb = convalescent chronic HBV with positive viral load  - C/w tenofovir 300 daily given likely cirrhosis  - F/u Hepatitis delta agent Ab

## 2021-06-09 NOTE — DISCHARGE NOTE NURSING/CASE MANAGEMENT/SOCIAL WORK - PATIENT PORTAL LINK FT
You can access the FollowMyHealth Patient Portal offered by [facility] by registering at the following website: [email or web address]. By joining [facility]’s FollowMyHealth portal, you will also be able to view your health information using other applications (apps) compatible with our system.

## 2021-06-13 RX ORDER — ELECTROLYTES/DEXTROSE
SOLUTION, ORAL ORAL DAILY
Qty: 90 | Refills: 3 | Status: ACTIVE | COMMUNITY
Start: 2021-06-13

## 2021-06-14 ENCOUNTER — APPOINTMENT (OUTPATIENT)
Dept: INTERNAL MEDICINE | Facility: CLINIC | Age: 66
End: 2021-06-14
Payer: MEDICARE

## 2021-06-14 ENCOUNTER — NON-APPOINTMENT (OUTPATIENT)
Age: 66
End: 2021-06-14

## 2021-06-14 ENCOUNTER — RESULT REVIEW (OUTPATIENT)
Age: 66
End: 2021-06-14

## 2021-06-14 VITALS
HEIGHT: 62 IN | SYSTOLIC BLOOD PRESSURE: 106 MMHG | BODY MASS INDEX: 23.37 KG/M2 | OXYGEN SATURATION: 97 % | DIASTOLIC BLOOD PRESSURE: 60 MMHG | TEMPERATURE: 96.62 F | HEART RATE: 82 BPM | WEIGHT: 127 LBS

## 2021-06-14 DIAGNOSIS — Z13.820 ENCOUNTER FOR SCREENING FOR OSTEOPOROSIS: ICD-10-CM

## 2021-06-14 DIAGNOSIS — Z82.0 FAMILY HISTORY OF EPILEPSY AND OTHER DISEASES OF THE NERVOUS SYSTEM: ICD-10-CM

## 2021-06-14 DIAGNOSIS — Z83.3 FAMILY HISTORY OF DIABETES MELLITUS: ICD-10-CM

## 2021-06-14 DIAGNOSIS — Z78.9 OTHER SPECIFIED HEALTH STATUS: ICD-10-CM

## 2021-06-14 DIAGNOSIS — H40.9 UNSPECIFIED GLAUCOMA: ICD-10-CM

## 2021-06-14 DIAGNOSIS — Z82.49 FAMILY HISTORY OF ISCHEMIC HEART DISEASE AND OTHER DISEASES OF THE CIRCULATORY SYSTEM: ICD-10-CM

## 2021-06-14 DIAGNOSIS — Z13.6 ENCOUNTER FOR SCREENING FOR CARDIOVASCULAR DISORDERS: ICD-10-CM

## 2021-06-14 DIAGNOSIS — Z23 ENCOUNTER FOR IMMUNIZATION: ICD-10-CM

## 2021-06-14 LAB
AFP-TM SERPL-MCNC: 5.8
HBA1C MFR BLD HPLC: 5
HCV AB SER QL: NON REACTIVE
HIV1+2 AB SPEC QL IA.RAPID: NON REACTIVE

## 2021-06-14 PROCEDURE — 93000 ELECTROCARDIOGRAM COMPLETE: CPT | Mod: 59

## 2021-06-14 PROCEDURE — G0439: CPT

## 2021-06-14 PROCEDURE — G0009: CPT

## 2021-06-14 PROCEDURE — G0444 DEPRESSION SCREEN ANNUAL: CPT | Mod: 59

## 2021-06-14 PROCEDURE — 90670 PCV13 VACCINE IM: CPT

## 2021-06-14 PROCEDURE — 99387 INIT PM E/M NEW PAT 65+ YRS: CPT | Mod: 25

## 2021-06-14 NOTE — HISTORY OF PRESENT ILLNESS
[FreeTextEntry1] : Establish care [de-identified] : Ms. GRISELDA MENDOZA is a 66 year old woman with pmhx of chronic hepatitis B, mild cirrhosis,~ MELD 17 recent ICH from coagulopathy, Glaucoma who presents to establish care. She endorses feeling improved. She denied any active complaints. She denied depression. She was in good state of health prior to her developing eye problems, which ended up being CVA from coagulopathy/thrombocytopenia from cirrhosis from etoh/hepatitis B. She was experiencing early satiety for a few months. Her father had hepatitis B. She worked as  in clinic. She endorses being tested often which were negative. She is surprised she was sick.

## 2021-06-14 NOTE — DATA REVIEWED
[FreeTextEntry1] : EKG- Sinus Rhythm HR 71. Poor r wave progression. QTC is now normal. Was prolonged in the hospital. TTE wnl. Advised stress test in follow up visit. \par \par \par Reviewed previous hospital notes, test and orders. Updated patient's record. Discussed plan as below with patient.

## 2021-06-14 NOTE — PHYSICAL EXAM
[No Acute Distress] : no acute distress [Well Nourished] : well nourished [Well Developed] : well developed [Normal Sclera/Conjunctiva] : normal sclera/conjunctiva [Normal Outer Ear/Nose] : the outer ears and nose were normal in appearance [No Lymphadenopathy] : no lymphadenopathy [Supple] : supple [No Respiratory Distress] : no respiratory distress  [No Accessory Muscle Use] : no accessory muscle use [Clear to Auscultation] : lungs were clear to auscultation bilaterally [Normal Rate] : normal rate  [Regular Rhythm] : with a regular rhythm [Normal S1, S2] : normal S1 and S2 [No Murmur] : no murmur heard [Pedal Pulses Present] : the pedal pulses are present [No Edema] : there was no peripheral edema [No Extremity Clubbing/Cyanosis] : no extremity clubbing/cyanosis [Normal Posterior Cervical Nodes] : no posterior cervical lymphadenopathy [Normal Anterior Cervical Nodes] : no anterior cervical lymphadenopathy [No CVA Tenderness] : no CVA  tenderness [No Spinal Tenderness] : no spinal tenderness [No Joint Swelling] : no joint swelling [Grossly Normal Strength/Tone] : grossly normal strength/tone [No Rash] : no rash [Coordination Grossly Intact] : coordination grossly intact [Normal Gait] : normal gait [No Focal Deficits] : no focal deficits [Soft] : abdomen soft [Non Tender] : non-tender [Non-distended] : non-distended [Normal Bowel Sounds] : normal bowel sounds [Normal Affect] : the affect was normal [Normal Insight/Judgement] : insight and judgment were intact [Comprehensive Foot Exam Normal] : Right and left foot were examined and both feet are normal. No ulcers in either foot. Toes are normal and with full ROM.  Normal tactile sensation with monofilament testing throughout both feet

## 2021-06-14 NOTE — HEALTH RISK ASSESSMENT
[0] : 2) Feeling down, depressed, or hopeless: Not at all (0) [Patient reported mammogram was normal] : Patient reported mammogram was normal [Patient reported colonoscopy was normal] : Patient reported colonoscopy was normal [Good] : ~his/her~  mood as  good [No] : In the past 12 months have you used drugs other than those required for medical reasons? No [No falls in past year] : Patient reported no falls in the past year [None] : None [With Family] : lives with family [Retired] : retired [Single] : single [Feels Safe at Home] : Feels safe at home [Fully functional (bathing, dressing, toileting, transferring, walking, feeding)] : Fully functional (bathing, dressing, toileting, transferring, walking, feeding) [Fully functional (using the telephone, shopping, preparing meals, housekeeping, doing laundry, using] : Fully functional and needs no help or supervision to perform IADLs (using the telephone, shopping, preparing meals, housekeeping, doing laundry, using transportation, managing medications and managing finances) [Smoke Detector] : smoke detector [Seat Belt] :  uses seat belt [Carbon Monoxide Detector] : carbon monoxide detector [Sunscreen] : uses sunscreen [HIV Test offered] : HIV Test offered [Hepatitis C test offered] : Hepatitis C test offered [With Patient/Caregiver] : With Patient/Caregiver [] : No [de-identified] : walking [[id number]] : 0 [Reports changes in hearing] : Reports no changes in hearing [Reports changes in vision] : Reports no changes in vision [MammogramDate] : 04/21 [ColonoscopyDate] : 01/16 [HIVDate] : 06/21 [HIVComments] : Non reactive [HepatitisCDate] : 06/21 [HepatitisCComments] : Non reactive [AdvancecareDate] : 06/21 [FreeTextEntry4] : She assigns her daughter as healthcare proxy.

## 2021-06-14 NOTE — ASSESSMENT
[FreeTextEntry1] : #HCM - Continue with healthy diet and physical activity. Labs ordered as below. Vaccination record to be faxed over. Prevnar vaccine today. Shingrix vaccine in next appointment. She is going to fax over covid vaccine. Mammography was done this year. Negative colonoscopy around 2016. Referrals provided as below.

## 2021-06-15 ENCOUNTER — TRANSCRIPTION ENCOUNTER (OUTPATIENT)
Age: 66
End: 2021-06-15

## 2021-06-16 LAB
25(OH)D3 SERPL-MCNC: 14.3 NG/ML
ALBUMIN SERPL ELPH-MCNC: 3.7 G/DL
ALP BLD-CCNC: 124 U/L
ALT SERPL-CCNC: 51 U/L
ANION GAP SERPL CALC-SCNC: 12 MMOL/L
APPEARANCE: ABNORMAL
AST SERPL-CCNC: 114 U/L
BACTERIA: ABNORMAL
BASOPHILS # BLD AUTO: 0.03 K/UL
BASOPHILS NFR BLD AUTO: 0.5 %
BILIRUB SERPL-MCNC: 2.6 MG/DL
BILIRUBIN URINE: NEGATIVE
BLOOD URINE: NEGATIVE
BUN SERPL-MCNC: 6 MG/DL
CALCIUM OXALATE CRYSTALS: ABNORMAL
CALCIUM SERPL-MCNC: 9.6 MG/DL
CHLORIDE SERPL-SCNC: 104 MMOL/L
CO2 SERPL-SCNC: 26 MMOL/L
COLOR: ABNORMAL
CREAT SERPL-MCNC: 0.56 MG/DL
CREAT SPEC-SCNC: 250 MG/DL
CREAT/PROT UR: 0.1 RATIO
EOSINOPHIL # BLD AUTO: 0.23 K/UL
EOSINOPHIL NFR BLD AUTO: 3.8 %
FOLATE SERPL-MCNC: >20 NG/ML
GLUCOSE QUALITATIVE U: NEGATIVE
GLUCOSE SERPL-MCNC: 115 MG/DL
HAPTOGLOB SERPL-MCNC: <20 MG/DL
HCT VFR BLD CALC: 40.4 %
HGB BLD-MCNC: 13.1 G/DL
HYALINE CASTS: 0 /LPF
IMM GRANULOCYTES NFR BLD AUTO: 0.2 %
INR PPP: 1.5 RATIO
KETONES URINE: NEGATIVE
LDH SERPL-CCNC: 293 U/L
LEUKOCYTE ESTERASE URINE: NEGATIVE
LYMPHOCYTES # BLD AUTO: 1.58 K/UL
LYMPHOCYTES NFR BLD AUTO: 26.3 %
MAN DIFF?: NORMAL
MCHC RBC-ENTMCNC: 32.4 GM/DL
MCHC RBC-ENTMCNC: 33.2 PG
MCV RBC AUTO: 102.5 FL
MICROSCOPIC-UA: NORMAL
MONOCYTES # BLD AUTO: 0.49 K/UL
MONOCYTES NFR BLD AUTO: 8.2 %
NEUTROPHILS # BLD AUTO: 3.66 K/UL
NEUTROPHILS NFR BLD AUTO: 61 %
NITRITE URINE: NEGATIVE
PH URINE: 5.5
PLATELET # BLD AUTO: 131 K/UL
POTASSIUM SERPL-SCNC: 3.5 MMOL/L
PROT SERPL-MCNC: 7.8 G/DL
PROT UR-MCNC: 23 MG/DL
PROTEIN URINE: ABNORMAL
PT BLD: 17.5 SEC
RBC # BLD: 3.94 M/UL
RBC # FLD: 15.2 %
RED BLOOD CELLS URINE: 0 /HPF
SODIUM SERPL-SCNC: 141 MMOL/L
SPECIFIC GRAVITY URINE: 1.03
SQUAMOUS EPITHELIAL CELLS: 3 /HPF
URINE COMMENTS: NORMAL
UROBILINOGEN URINE: ABNORMAL
VIT B12 SERPL-MCNC: 1066 PG/ML
WBC # FLD AUTO: 6 K/UL
WHITE BLOOD CELLS URINE: 2 /HPF

## 2021-06-17 DIAGNOSIS — Z12.39 ENCOUNTER FOR OTHER SCREENING FOR MALIGNANT NEOPLASM OF BREAST: ICD-10-CM

## 2021-06-17 LAB
M TB IFN-G BLD-IMP: POSITIVE
QUANTIFERON TB PLUS MITOGEN MINUS NIL: 3.23 IU/ML
QUANTIFERON TB PLUS NIL: 0.12 IU/ML
QUANTIFERON TB PLUS TB1 MINUS NIL: 3.33 IU/ML
QUANTIFERON TB PLUS TB2 MINUS NIL: 2.59 IU/ML

## 2021-06-18 DIAGNOSIS — N39.0 URINARY TRACT INFECTION, SITE NOT SPECIFIED: ICD-10-CM

## 2021-06-18 LAB
BACTERIA UR CULT: ABNORMAL
HDV AB SER-ACNC: NEGATIVE — SIGNIFICANT CHANGE UP

## 2021-06-23 ENCOUNTER — OUTPATIENT (OUTPATIENT)
Dept: OUTPATIENT SERVICES | Facility: HOSPITAL | Age: 66
LOS: 1 days | End: 2021-06-23

## 2021-06-23 DIAGNOSIS — Z13.820 ENCOUNTER FOR SCREENING FOR OSTEOPOROSIS: ICD-10-CM

## 2021-06-23 DIAGNOSIS — Z00.00 ENCOUNTER FOR GENERAL ADULT MEDICAL EXAMINATION WITHOUT ABNORMAL FINDINGS: ICD-10-CM

## 2021-06-23 DIAGNOSIS — K74.60 UNSPECIFIED CIRRHOSIS OF LIVER: ICD-10-CM

## 2021-06-25 ENCOUNTER — APPOINTMENT (OUTPATIENT)
Dept: RADIOLOGY | Facility: IMAGING CENTER | Age: 66
End: 2021-06-25
Payer: MEDICARE

## 2021-06-25 ENCOUNTER — OUTPATIENT (OUTPATIENT)
Dept: OUTPATIENT SERVICES | Facility: HOSPITAL | Age: 66
LOS: 1 days | End: 2021-06-25
Payer: MEDICARE

## 2021-06-25 DIAGNOSIS — Z00.8 ENCOUNTER FOR OTHER GENERAL EXAMINATION: ICD-10-CM

## 2021-06-25 DIAGNOSIS — Z13.820 ENCOUNTER FOR SCREENING FOR OSTEOPOROSIS: ICD-10-CM

## 2021-06-25 DIAGNOSIS — K74.60 UNSPECIFIED CIRRHOSIS OF LIVER: ICD-10-CM

## 2021-06-25 DIAGNOSIS — Z00.00 ENCOUNTER FOR GENERAL ADULT MEDICAL EXAMINATION WITHOUT ABNORMAL FINDINGS: ICD-10-CM

## 2021-06-25 PROCEDURE — 77080 DXA BONE DENSITY AXIAL: CPT

## 2021-06-25 PROCEDURE — 77080 DXA BONE DENSITY AXIAL: CPT | Mod: 26

## 2021-06-28 RX ORDER — UBIDECARENONE/VIT E ACET 100MG-5
50 MCG CAPSULE ORAL
Qty: 30 | Refills: 11 | Status: ACTIVE | COMMUNITY
Start: 2021-06-28 | End: 1900-01-01

## 2021-07-14 ENCOUNTER — APPOINTMENT (OUTPATIENT)
Dept: HEPATOLOGY | Facility: CLINIC | Age: 66
End: 2021-07-14
Payer: MEDICARE

## 2021-07-14 VITALS
WEIGHT: 127 LBS | HEART RATE: 86 BPM | DIASTOLIC BLOOD PRESSURE: 74 MMHG | SYSTOLIC BLOOD PRESSURE: 123 MMHG | HEIGHT: 62 IN | TEMPERATURE: 98 F | BODY MASS INDEX: 23.37 KG/M2

## 2021-07-14 LAB
AFP-TM SERPL-MCNC: 6.7 NG/ML
ALBUMIN SERPL ELPH-MCNC: 3.5 G/DL
ALP BLD-CCNC: 182 U/L
ALT SERPL-CCNC: 43 U/L
ANION GAP SERPL CALC-SCNC: 15 MMOL/L
AST SERPL-CCNC: 92 U/L
BASOPHILS # BLD AUTO: 0.04 K/UL
BASOPHILS NFR BLD AUTO: 0.6 %
BILIRUB SERPL-MCNC: 2.1 MG/DL
BUN SERPL-MCNC: 8 MG/DL
CALCIUM SERPL-MCNC: 9.3 MG/DL
CHLORIDE SERPL-SCNC: 106 MMOL/L
CO2 SERPL-SCNC: 20 MMOL/L
CREAT SERPL-MCNC: 0.58 MG/DL
EOSINOPHIL # BLD AUTO: 0.2 K/UL
EOSINOPHIL NFR BLD AUTO: 3.1 %
HCT VFR BLD CALC: 37.6 %
HGB BLD-MCNC: 12.8 G/DL
IMM GRANULOCYTES NFR BLD AUTO: 0.2 %
LYMPHOCYTES # BLD AUTO: 2.05 K/UL
LYMPHOCYTES NFR BLD AUTO: 31.8 %
MAN DIFF?: NORMAL
MCHC RBC-ENTMCNC: 32.1 PG
MCHC RBC-ENTMCNC: 34 GM/DL
MCV RBC AUTO: 94.2 FL
MONOCYTES # BLD AUTO: 0.63 K/UL
MONOCYTES NFR BLD AUTO: 9.8 %
NEUTROPHILS # BLD AUTO: 3.52 K/UL
NEUTROPHILS NFR BLD AUTO: 54.5 %
PLATELET # BLD AUTO: 128 K/UL
POTASSIUM SERPL-SCNC: 3.7 MMOL/L
PROT SERPL-MCNC: 7.6 G/DL
RBC # BLD: 3.99 M/UL
RBC # FLD: 13.9 %
SODIUM SERPL-SCNC: 141 MMOL/L
WBC # FLD AUTO: 6.45 K/UL

## 2021-07-14 PROCEDURE — 99204 OFFICE O/P NEW MOD 45 MIN: CPT

## 2021-07-14 PROCEDURE — 99214 OFFICE O/P EST MOD 30 MIN: CPT

## 2021-07-14 RX ORDER — NITROFURANTOIN (MONOHYDRATE/MACROCRYSTALS) 25; 75 MG/1; MG/1
100 CAPSULE ORAL
Qty: 10 | Refills: 0 | Status: DISCONTINUED | COMMUNITY
Start: 2021-06-18 | End: 2021-07-14

## 2021-07-14 RX ORDER — BRIMONIDINE TARTRATE, TIMOLOL MALEATE 2; 5 MG/ML; MG/ML
0.2-0.5 SOLUTION/ DROPS OPHTHALMIC
Qty: 1 | Refills: 0 | Status: DISCONTINUED | COMMUNITY
Start: 2021-05-18 | End: 2021-07-14

## 2021-07-14 RX ORDER — LATANOPROST/PF 0.005 %
0.01 DROPS OPHTHALMIC (EYE)
Qty: 1 | Refills: 1 | Status: DISCONTINUED | COMMUNITY
Start: 2021-05-18 | End: 2021-07-14

## 2021-07-15 LAB
HBV DNA # SERPL NAA+PROBE: NOT DETECTED
HEPB DNA PCR LOG: NOT DETECTED LOG10IU/ML

## 2021-07-19 ENCOUNTER — APPOINTMENT (OUTPATIENT)
Dept: HEPATOLOGY | Facility: CLINIC | Age: 66
End: 2021-07-19

## 2021-07-26 ENCOUNTER — APPOINTMENT (OUTPATIENT)
Dept: PULMONOLOGY | Facility: CLINIC | Age: 66
End: 2021-07-26
Payer: MEDICARE

## 2021-07-26 VITALS
WEIGHT: 128 LBS | HEIGHT: 62 IN | OXYGEN SATURATION: 98 % | TEMPERATURE: 98.1 F | BODY MASS INDEX: 23.55 KG/M2 | HEART RATE: 74 BPM

## 2021-07-26 VITALS — DIASTOLIC BLOOD PRESSURE: 70 MMHG | HEART RATE: 73 BPM | OXYGEN SATURATION: 94 % | SYSTOLIC BLOOD PRESSURE: 119 MMHG

## 2021-07-26 PROCEDURE — 99204 OFFICE O/P NEW MOD 45 MIN: CPT | Mod: 25

## 2021-07-26 PROCEDURE — ZZZZZ: CPT

## 2021-07-26 PROCEDURE — 94726 PLETHYSMOGRAPHY LUNG VOLUMES: CPT

## 2021-07-26 PROCEDURE — 94010 BREATHING CAPACITY TEST: CPT

## 2021-07-26 PROCEDURE — 94729 DIFFUSING CAPACITY: CPT

## 2021-07-27 NOTE — HISTORY OF PRESENT ILLNESS
[Never] : never [TextBox_4] : This is a 66-year-old female with significant past medical history of hepatitis B with liver abnormality who comes in for evaluation of an abnormal lab and abnormal CAT scan.\par \par The patient indicates that she was diagnosed with hepatitis  B with abnormal liver function. She went to go see a hepatologist (Dr. Mckinney) who recommended evaluation and treatment with tenofovir 300 mg once a day. During her formal evaluation, she was found to be Quantiferon Gold positive suggestive of latent tuberculosis. She had mentioned previously of some hemoptysis however she denies this during this encounter. 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 She denies any constitutional symptoms with the exception of weight loss.\par \par She also had a CAT scan of her chest which showed multiple bilateral pulmonary nodules subcentimeter in size with the largest nodule being 4 mm in the left lower lobe. She does have a large mass in her liver which is currently being worked up. Otherwise she denies any pulmonary symptoms at this time including chest pain, shortness of breath, or cough.

## 2021-07-27 NOTE — PHYSICAL EXAM
[No Acute Distress] : no acute distress [Normal Oropharynx] : normal oropharynx [II] : Mallampati Class: II [Normal Appearance] : normal appearance [No Neck Mass] : no neck mass [Normal Rate/Rhythm] : normal rate/rhythm [Normal S1, S2] : normal s1, s2 [No Murmurs] : no murmurs [No Resp Distress] : no resp distress [Clear to Auscultation Bilaterally] : clear to auscultation bilaterally [No Clubbing] : no clubbing [No Cyanosis] : no cyanosis [No Edema] : no edema [No Focal Deficits] : no focal deficits [Oriented x3] : oriented x3 [Normal Affect] : normal affect

## 2021-07-27 NOTE — ASSESSMENT
[FreeTextEntry1] : This is a 66-year-old female with significant past medical history of hepatitis B with liver abnormality who comes in for evaluation of an abnormal lab and abnormal CAT scan.\par \par Positive Quantiferon Gold: The patient likely has latent tuberculosis given her positive blood test. 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 Though she has an episode what may appear to be hemoptysis this may very well be hematemesis. She will undergo evaluation for esophageal varices with an endoscopy by her gastroenterologist. She has no other constitutional symptoms and her CAT scan does not show any active tuberculosis such as a cavitary lesion, miliary pattern, or areas of multifocal pneumonia. Given her liver disease, I do not feel that we should rush to treat latent tuberculosis unless she is going to undergo treatment that would cause her to become immunocompromised. Her symptoms should be monitored and if there is a increased risk for tuberculosis we would need to get sputum cultures to determine if this were active or latent tuberculosis.\par \par Pulmonary nodules: CT scan of her chest which showed multiple bilateral pulmonary nodules subcentimeter in size with the largest nodule being 4 mm in the left lower lobe. The etiology of these nodules is unknown and the majority of these nodules are less than 4 mm in diameter. Given her hepatic lesion she should undergo a repeat CAT scan around September to determine if these pulmonary nodules have increased in size or have disappeared.\par \par I will call her with the results of the CAT scan. I have informed Dr. Gr of my discussion with the patient and her daughter regarding her positive Quantiferon Gold test.\par \par I will call the patient and/or daughter with the results of the CT scan.

## 2021-08-11 ENCOUNTER — APPOINTMENT (OUTPATIENT)
Dept: GASTROENTEROLOGY | Facility: CLINIC | Age: 66
End: 2021-08-11
Payer: MEDICARE

## 2021-08-11 VITALS
SYSTOLIC BLOOD PRESSURE: 112 MMHG | DIASTOLIC BLOOD PRESSURE: 71 MMHG | BODY MASS INDEX: 23.78 KG/M2 | WEIGHT: 130 LBS | OXYGEN SATURATION: 96 % | TEMPERATURE: 97.8 F | HEART RATE: 72 BPM

## 2021-08-11 PROCEDURE — 99204 OFFICE O/P NEW MOD 45 MIN: CPT

## 2021-08-11 PROCEDURE — 99214 OFFICE O/P EST MOD 30 MIN: CPT

## 2021-08-11 NOTE — HISTORY OF PRESENT ILLNESS
[de-identified] : Griselda is a 66-year-old woman referred for evaluation of hepatitis B. She currently feels well. She was hospitalized at [facility] in late May and early June for hypertension. While in the hospital, she was noted to have hepatitis B. She has no obvious risk factors for hepatitis B. A CT scan of the abdomen May 2021 showed a 6 x 4.5 cm mass in segment 6. MRI Ashtyn 3 showed no lesions in the liver.\par \par While at [facility], her hepatitis B surface antigen was positive, E antigen negative, E antibody positive, hepatitis B core IgG positive, HBV DNA 31, hepatitis C antibody negative.\par \par Laboratory test June 14, 2021 INR 1.5, QuantiFERON positive, total bilirubin 2.6, ALT 51, , alkaline phosphatase 124, creatinine 0.56, hemoglobin 13.1, platelet count 131,000 \par \par AFP normal \par \par \par

## 2021-08-11 NOTE — PHYSICAL EXAM
[General Appearance - Alert] : alert [General Appearance - In No Acute Distress] : in no acute distress [Sclera] : the sclera and conjunctiva were normal [PERRL With Normal Accommodation] : pupils were equal in size, round, and reactive to light [Extraocular Movements] : extraocular movements were intact [Outer Ear] : the ears and nose were normal in appearance [Oropharynx] : the oropharynx was normal [Neck Appearance] : the appearance of the neck was normal [Neck Cervical Mass (___cm)] : no neck mass was observed [Jugular Venous Distention Increased] : there was no jugular-venous distention [Thyroid Diffuse Enlargement] : the thyroid was not enlarged [Thyroid Nodule] : there were no palpable thyroid nodules [Normal] : normal [Soft, Nontender] : the abdomen was soft and nontender [No Mass] : no masses were palpated [No HSM] : no hepatosplenomegaly noted [No CVA Tenderness] : no ~M costovertebral angle tenderness [No Spinal Tenderness] : no spinal tenderness [Abnormal Walk] : normal gait [Nail Clubbing] : no clubbing  or cyanosis of the fingernails [Musculoskeletal - Swelling] : no joint swelling seen [Motor Tone] : muscle strength and tone were normal [Skin Color & Pigmentation] : normal skin color and pigmentation [Skin Turgor] : normal skin turgor [] : no rash [Oriented To Time, Place, And Person] : oriented to person, place, and time [Impaired Insight] : insight and judgment were intact [Affect] : the affect was normal

## 2021-08-30 DIAGNOSIS — R19.5 OTHER FECAL ABNORMALITIES: ICD-10-CM

## 2021-08-30 DIAGNOSIS — Z20.822 CONTACT WITH AND (SUSPECTED) EXPOSURE TO COVID-19: ICD-10-CM

## 2021-08-30 RX ORDER — SODIUM SULFATE, POTASSIUM SULFATE, MAGNESIUM SULFATE 17.5; 3.13; 1.6 G/ML; G/ML; G/ML
17.5-3.13-1.6 SOLUTION, CONCENTRATE ORAL
Qty: 1 | Refills: 0 | Status: ACTIVE | COMMUNITY
Start: 2021-08-30 | End: 1900-01-01

## 2021-09-16 ENCOUNTER — RESULT REVIEW (OUTPATIENT)
Age: 66
End: 2021-09-16

## 2021-09-16 ENCOUNTER — OUTPATIENT (OUTPATIENT)
Dept: OUTPATIENT SERVICES | Facility: HOSPITAL | Age: 66
LOS: 1 days | End: 2021-09-16
Payer: MEDICARE

## 2021-09-16 ENCOUNTER — APPOINTMENT (OUTPATIENT)
Dept: MRI IMAGING | Facility: IMAGING CENTER | Age: 66
End: 2021-09-16
Payer: MEDICARE

## 2021-09-16 ENCOUNTER — APPOINTMENT (OUTPATIENT)
Dept: CT IMAGING | Facility: IMAGING CENTER | Age: 66
End: 2021-09-16
Payer: MEDICARE

## 2021-09-16 DIAGNOSIS — B18.1 CHRONIC VIRAL HEPATITIS B WITHOUT DELTA-AGENT: ICD-10-CM

## 2021-09-16 DIAGNOSIS — K74.60 UNSPECIFIED CIRRHOSIS OF LIVER: ICD-10-CM

## 2021-09-16 DIAGNOSIS — R91.1 SOLITARY PULMONARY NODULE: ICD-10-CM

## 2021-09-16 PROCEDURE — 71250 CT THORAX DX C-: CPT

## 2021-09-16 PROCEDURE — 71250 CT THORAX DX C-: CPT | Mod: 26,MH

## 2021-09-16 PROCEDURE — 74183 MRI ABD W/O CNTR FLWD CNTR: CPT | Mod: MH

## 2021-09-16 PROCEDURE — 74183 MRI ABD W/O CNTR FLWD CNTR: CPT | Mod: 26,MH

## 2021-09-16 PROCEDURE — A9585: CPT

## 2021-09-20 ENCOUNTER — APPOINTMENT (OUTPATIENT)
Dept: HEPATOLOGY | Facility: CLINIC | Age: 66
End: 2021-09-20
Payer: MEDICARE

## 2021-09-20 PROCEDURE — 91200 LIVER ELASTOGRAPHY: CPT

## 2021-09-22 ENCOUNTER — APPOINTMENT (OUTPATIENT)
Dept: ENDOCRINOLOGY | Facility: CLINIC | Age: 66
End: 2021-09-22

## 2021-09-23 ENCOUNTER — NON-APPOINTMENT (OUTPATIENT)
Age: 66
End: 2021-09-23

## 2021-09-24 PROBLEM — Z78.9 OTHER SPECIFIED HEALTH STATUS: Chronic | Status: ACTIVE | Noted: 2021-05-29

## 2021-09-28 ENCOUNTER — NON-APPOINTMENT (OUTPATIENT)
Age: 66
End: 2021-09-28

## 2021-10-07 ENCOUNTER — TRANSCRIPTION ENCOUNTER (OUTPATIENT)
Age: 66
End: 2021-10-07

## 2021-10-08 ENCOUNTER — NON-APPOINTMENT (OUTPATIENT)
Age: 66
End: 2021-10-08

## 2021-10-09 ENCOUNTER — APPOINTMENT (OUTPATIENT)
Dept: DISASTER EMERGENCY | Facility: CLINIC | Age: 66
End: 2021-10-09

## 2021-10-09 DIAGNOSIS — Z01.818 ENCOUNTER FOR OTHER PREPROCEDURAL EXAMINATION: ICD-10-CM

## 2021-10-13 ENCOUNTER — APPOINTMENT (OUTPATIENT)
Dept: GASTROENTEROLOGY | Facility: CLINIC | Age: 66
End: 2021-10-13
Payer: MEDICARE

## 2021-10-13 VITALS
SYSTOLIC BLOOD PRESSURE: 135 MMHG | WEIGHT: 125 LBS | OXYGEN SATURATION: 97 % | HEIGHT: 60 IN | DIASTOLIC BLOOD PRESSURE: 78 MMHG | RESPIRATION RATE: 17 BRPM | BODY MASS INDEX: 24.54 KG/M2 | TEMPERATURE: 96 F | HEART RATE: 68 BPM

## 2021-10-13 DIAGNOSIS — Z12.11 ENCOUNTER FOR SCREENING FOR MALIGNANT NEOPLASM OF COLON: ICD-10-CM

## 2021-10-13 PROCEDURE — 99214 OFFICE O/P EST MOD 30 MIN: CPT

## 2021-10-13 NOTE — ASSESSMENT
[FreeTextEntry1] : Cont Rx \par \par Get Fibroscan \par \par Hepatology F/U \par \par Sx F/U \par \par Will need colon + Cologuard

## 2021-10-13 NOTE — PHYSICAL EXAM
[General Appearance - Alert] : alert [General Appearance - In No Acute Distress] : in no acute distress [Sclera] : the sclera and conjunctiva were normal [PERRL With Normal Accommodation] : pupils were equal in size, round, and reactive to light [Extraocular Movements] : extraocular movements were intact [Outer Ear] : the ears and nose were normal in appearance [Oropharynx] : the oropharynx was normal [Neck Cervical Mass (___cm)] : no neck mass was observed [Neck Appearance] : the appearance of the neck was normal [Jugular Venous Distention Increased] : there was no jugular-venous distention [Thyroid Diffuse Enlargement] : the thyroid was not enlarged [Thyroid Nodule] : there were no palpable thyroid nodules [Normal] : normal [Soft, Nontender] : the abdomen was soft and nontender [No Mass] : no masses were palpated [No HSM] : no hepatosplenomegaly noted [No CVA Tenderness] : no ~M costovertebral angle tenderness [No Spinal Tenderness] : no spinal tenderness [Abnormal Walk] : normal gait [Nail Clubbing] : no clubbing  or cyanosis of the fingernails [Musculoskeletal - Swelling] : no joint swelling seen [Motor Tone] : muscle strength and tone were normal [Skin Color & Pigmentation] : normal skin color and pigmentation [Skin Turgor] : normal skin turgor [] : no rash [Oriented To Time, Place, And Person] : oriented to person, place, and time [Impaired Insight] : insight and judgment were intact [Affect] : the affect was normal

## 2021-10-13 NOTE — HISTORY OF PRESENT ILLNESS
[de-identified] : Hep B - DNA - negative\par \par Fibroscan-P \par \par Saw Sx re GB\par \par Will operate only if symptoms\par \par MR reviewed

## 2021-10-14 ENCOUNTER — NON-APPOINTMENT (OUTPATIENT)
Age: 66
End: 2021-10-14

## 2021-10-18 ENCOUNTER — APPOINTMENT (OUTPATIENT)
Dept: INTERNAL MEDICINE | Facility: CLINIC | Age: 66
End: 2021-10-18
Payer: MEDICARE

## 2021-10-18 VITALS
TEMPERATURE: 35.4 F | DIASTOLIC BLOOD PRESSURE: 72 MMHG | BODY MASS INDEX: 25.52 KG/M2 | HEIGHT: 60 IN | HEART RATE: 66 BPM | WEIGHT: 130 LBS | SYSTOLIC BLOOD PRESSURE: 134 MMHG | OXYGEN SATURATION: 99 %

## 2021-10-18 DIAGNOSIS — Z23 ENCOUNTER FOR IMMUNIZATION: ICD-10-CM

## 2021-10-18 DIAGNOSIS — R03.0 ELEVATED BLOOD-PRESSURE READING, W/OUT DIAGNOSIS OF HYPERTENSION: ICD-10-CM

## 2021-10-18 DIAGNOSIS — Z01.818 ENCOUNTER FOR OTHER PREPROCEDURAL EXAMINATION: ICD-10-CM

## 2021-10-18 DIAGNOSIS — I34.0 NONRHEUMATIC MITRAL (VALVE) INSUFFICIENCY: ICD-10-CM

## 2021-10-18 DIAGNOSIS — I25.84 ATHEROSCLEROTIC HEART DISEASE OF NATIVE CORONARY ARTERY W/OUT ANGINA PECTORIS: ICD-10-CM

## 2021-10-18 DIAGNOSIS — I25.10 ATHEROSCLEROTIC HEART DISEASE OF NATIVE CORONARY ARTERY W/OUT ANGINA PECTORIS: ICD-10-CM

## 2021-10-18 PROCEDURE — 90662 IIV NO PRSV INCREASED AG IM: CPT

## 2021-10-18 PROCEDURE — G0008: CPT

## 2021-10-18 PROCEDURE — 99214 OFFICE O/P EST MOD 30 MIN: CPT | Mod: 25

## 2021-10-18 RX ORDER — FOLIC ACID 1 MG/1
1 TABLET ORAL
Qty: 90 | Refills: 0 | Status: DISCONTINUED | COMMUNITY
Start: 2021-06-13 | End: 2021-10-18

## 2021-10-18 NOTE — ASSESSMENT
[High Risk Surgery - Intraperitoneal, Intrathoracic or Supringuinal Vascular Procedures] : High Risk Surgery - Intraperitoneal, Intrathoracic or Supringuinal Vascular Procedures - No (0) [Ischemic Heart Disease] : Ischemic Heart Disease - No (0) [Congestive Heart Failure] : Congestive Heart Failure - No (0) [Prior Cerebrovascular Accident or TIA] : Prior Cerebrovascular Accident or TIA - Yes (1) [Creatinine >= 2mg/dL (1 Point)] : Creatinine >= 2mg/dL - No (0) [Insulin-dependent Diabetic (1 Point)] : Insulin-dependent Diabetic - No (0) [1] : 1 , RCRI Class: II, Risk of Post-Op Cardiac Complications: 6.0%, 95% CI for Risk Estimate: 4.9% - 7.4% [Patient Optimized for Surgery] : Patient optimized for surgery [As per surgery] : as per surgery [No Further Testing Recommended] : no further testing recommended [FreeTextEntry4] : Patient is at elevated risk given her CVA history. Blood pressure is elevated today, monitor vital signs in the perioperative period given her Intracranial hemorrhage.. Labs ordered to assess thrombocytopenia and INR, stage 4 cirrhosis history. Otherwise, she is medically optimized for procedure. \par \par Flu shot today. Counseled on covid booster vaccine.

## 2021-10-18 NOTE — HISTORY OF PRESENT ILLNESS
[No Pertinent Cardiac History] : no history of aortic stenosis, atrial fibrillation, coronary artery disease, recent myocardial infarction, or implantable device/pacemaker [No Adverse Anesthesia Reaction] : no adverse anesthesia reaction in self or family member [No Pertinent Pulmonary History] : no history of asthma, COPD, sleep apnea, or smoking [(Patient denies any chest pain, claudication, dyspnea on exertion, orthopnea, palpitations or syncope)] : Patient denies any chest pain, claudication, dyspnea on exertion, orthopnea, palpitations or syncope [Moderate (4-6 METs)] : Moderate (4-6 METs) [Aortic Stenosis] : no aortic stenosis [Atrial Fibrillation] : no atrial fibrillation [Coronary Artery Disease] : no coronary artery disease [Recent Myocardial Infarction] : no recent myocardial infarction [Implantable Device/Pacemaker] : no implantable device/pacemaker [Asthma] : no asthma [COPD] : no COPD [Sleep Apnea] : no sleep apnea [Smoker] : not a smoker [Family Member] : no family member with adverse anesthesia reaction/sudden death [Self] : no previous adverse anesthesia reaction [Chronic Anticoagulation] : no chronic anticoagulation [Chronic Kidney Disease] : no chronic kidney disease [Diabetes] : no diabetes [FreeTextEntry1] : Colonoscopy [FreeTextEntry2] : 10/18/21 [FreeTextEntry3] : Dr. Brunner [FreeTextEntry4] : Ms. GRISELDA MENDOZA is a 66 year old woman with pmhx of chronic hepatitis B, cirrhosis F4, , recent ICH from coagulopathy, Glaucoma, osteoporosis,  pulm nodule, positive quant gold whom presents for a follow up/ medical clearance. She is accompanied by daughter via [employer]. She endorses being in good health and good mood. PFTs done recently were unremarkable. She had echo in the hospital with normal LV function. Patient has had surgery in the past, they denied any adverse reaction to anesthesia, nor any clotting.  She had a bleeding episode recently from coagulopathy from her liver disease. She walks regularly, and denied any dyspnea, chest pain, lightheadedness, nor wheezing with activity.

## 2021-10-18 NOTE — PHYSICAL EXAM
[No Acute Distress] : no acute distress [Well Nourished] : well nourished [Well Developed] : well developed [Normal Sclera/Conjunctiva] : normal sclera/conjunctiva [Normal Outer Ear/Nose] : the outer ears and nose were normal in appearance [EOMI] : extraocular movements intact [No Respiratory Distress] : no respiratory distress  [No Accessory Muscle Use] : no accessory muscle use [Normal Rate] : normal rate  [Regular Rhythm] : with a regular rhythm [Normal S1, S2] : normal S1 and S2 [No Murmur] : no murmur heard [No Edema] : there was no peripheral edema [Soft] : abdomen soft [Non Tender] : non-tender [Non-distended] : non-distended [Normal Bowel Sounds] : normal bowel sounds [No Rash] : no rash [Normal Gait] : normal gait [No Focal Deficits] : no focal deficits [Normal Affect] : the affect was normal [Normal Insight/Judgement] : insight and judgment were intact

## 2021-10-19 LAB
ALBUMIN SERPL ELPH-MCNC: 3.7 G/DL
ALP BLD-CCNC: 271 U/L
ALT SERPL-CCNC: 34 U/L
ANION GAP SERPL CALC-SCNC: 10 MMOL/L
APTT BLD: 42.8 SEC
AST SERPL-CCNC: 60 U/L
BASOPHILS # BLD AUTO: 0.03 K/UL
BASOPHILS NFR BLD AUTO: 0.5 %
BILIRUB DIRECT SERPL-MCNC: 0.9 MG/DL
BILIRUB SERPL-MCNC: 1.9 MG/DL
BUN SERPL-MCNC: 11 MG/DL
CALCIUM SERPL-MCNC: 9.4 MG/DL
CHLORIDE SERPL-SCNC: 107 MMOL/L
CO2 SERPL-SCNC: 26 MMOL/L
CREAT SERPL-MCNC: 0.75 MG/DL
EOSINOPHIL # BLD AUTO: 0.28 K/UL
EOSINOPHIL NFR BLD AUTO: 4.9 %
FOLATE SERPL-MCNC: >20 NG/ML
GLUCOSE SERPL-MCNC: 96 MG/DL
HCT VFR BLD CALC: 38.4 %
HGB BLD-MCNC: 12.9 G/DL
IMM GRANULOCYTES NFR BLD AUTO: 0.2 %
INR PPP: 1.54 RATIO
LYMPHOCYTES # BLD AUTO: 1.83 K/UL
LYMPHOCYTES NFR BLD AUTO: 31.8 %
MAN DIFF?: NORMAL
MCHC RBC-ENTMCNC: 31.2 PG
MCHC RBC-ENTMCNC: 33.6 GM/DL
MCV RBC AUTO: 93 FL
MONOCYTES # BLD AUTO: 0.62 K/UL
MONOCYTES NFR BLD AUTO: 10.8 %
NEUTROPHILS # BLD AUTO: 2.99 K/UL
NEUTROPHILS NFR BLD AUTO: 51.8 %
PLATELET # BLD AUTO: 97 K/UL
POTASSIUM SERPL-SCNC: 4.2 MMOL/L
PROT SERPL-MCNC: 7.3 G/DL
PT BLD: 17.8 SEC
RBC # BLD: 4.13 M/UL
RBC # FLD: 15 %
SODIUM SERPL-SCNC: 143 MMOL/L
WBC # FLD AUTO: 5.76 K/UL

## 2021-11-15 ENCOUNTER — APPOINTMENT (OUTPATIENT)
Dept: GASTROENTEROLOGY | Facility: CLINIC | Age: 66
End: 2021-11-15

## 2021-11-15 ENCOUNTER — APPOINTMENT (OUTPATIENT)
Dept: DISASTER EMERGENCY | Facility: CLINIC | Age: 66
End: 2021-11-15

## 2021-11-16 LAB — SARS-COV-2 N GENE NPH QL NAA+PROBE: NOT DETECTED

## 2021-11-17 ENCOUNTER — APPOINTMENT (OUTPATIENT)
Dept: ULTRASOUND IMAGING | Facility: CLINIC | Age: 66
End: 2021-11-17
Payer: MEDICARE

## 2021-11-17 PROCEDURE — 76705 ECHO EXAM OF ABDOMEN: CPT

## 2021-11-18 ENCOUNTER — APPOINTMENT (OUTPATIENT)
Dept: GASTROENTEROLOGY | Facility: AMBULATORY MEDICAL SERVICES | Age: 66
End: 2021-11-18
Payer: MEDICARE

## 2021-11-18 PROCEDURE — 45380 COLONOSCOPY AND BIOPSY: CPT | Mod: PT

## 2022-01-18 ENCOUNTER — APPOINTMENT (OUTPATIENT)
Dept: HEPATOLOGY | Facility: CLINIC | Age: 67
End: 2022-01-18

## 2022-01-20 ENCOUNTER — APPOINTMENT (OUTPATIENT)
Dept: GASTROENTEROLOGY | Facility: CLINIC | Age: 67
End: 2022-01-20
Payer: MEDICARE

## 2022-01-20 VITALS
OXYGEN SATURATION: 96 % | HEIGHT: 62 IN | HEART RATE: 70 BPM | WEIGHT: 127 LBS | BODY MASS INDEX: 23.37 KG/M2 | TEMPERATURE: 98.4 F | SYSTOLIC BLOOD PRESSURE: 120 MMHG | DIASTOLIC BLOOD PRESSURE: 78 MMHG

## 2022-01-20 PROCEDURE — 99214 OFFICE O/P EST MOD 30 MIN: CPT

## 2022-01-20 NOTE — ASSESSMENT
[FreeTextEntry1] : Cont Rx\par \par Hepatology F/U - names given again \par \par Sx F/U \par \par Had Colon \par \par Labs\par \par Sono 6 mo

## 2022-01-20 NOTE — PHYSICAL EXAM
[General Appearance - Alert] : alert [General Appearance - In No Acute Distress] : in no acute distress [Sclera] : the sclera and conjunctiva were normal [PERRL With Normal Accommodation] : pupils were equal in size, round, and reactive to light [Extraocular Movements] : extraocular movements were intact [Outer Ear] : the ears and nose were normal in appearance [Oropharynx] : the oropharynx was normal [Neck Appearance] : the appearance of the neck was normal [Neck Cervical Mass (___cm)] : no neck mass was observed [Jugular Venous Distention Increased] : there was no jugular-venous distention [Thyroid Diffuse Enlargement] : the thyroid was not enlarged [Thyroid Nodule] : there were no palpable thyroid nodules [No CVA Tenderness] : no ~M costovertebral angle tenderness [No Spinal Tenderness] : no spinal tenderness [Abnormal Walk] : normal gait [Nail Clubbing] : no clubbing  or cyanosis of the fingernails [Musculoskeletal - Swelling] : no joint swelling seen [Motor Tone] : muscle strength and tone were normal [Skin Color & Pigmentation] : normal skin color and pigmentation [Skin Turgor] : normal skin turgor [] : no rash [Oriented To Time, Place, And Person] : oriented to person, place, and time [Impaired Insight] : insight and judgment were intact [Affect] : the affect was normal [Normal] : normal [Soft, Nontender] : the abdomen was soft and nontender [No Mass] : no masses were palpated [No HSM] : no hepatosplenomegaly noted

## 2022-01-20 NOTE — HISTORY OF PRESENT ILLNESS
[de-identified] : Hep B - DNA - negative\par \par Fibroscan- F4\par \par \par MR reviewed again \par \par \par ETOH Liver Dz \par \par Not drinking now

## 2022-01-21 ENCOUNTER — APPOINTMENT (OUTPATIENT)
Dept: ENDOCRINOLOGY | Facility: CLINIC | Age: 67
End: 2022-01-21
Payer: MEDICARE

## 2022-01-21 VITALS
BODY MASS INDEX: 23 KG/M2 | SYSTOLIC BLOOD PRESSURE: 120 MMHG | HEIGHT: 62 IN | OXYGEN SATURATION: 97 % | DIASTOLIC BLOOD PRESSURE: 78 MMHG | WEIGHT: 125 LBS | HEART RATE: 62 BPM

## 2022-01-21 PROCEDURE — 36415 COLL VENOUS BLD VENIPUNCTURE: CPT

## 2022-01-21 PROCEDURE — 99204 OFFICE O/P NEW MOD 45 MIN: CPT | Mod: 25

## 2022-01-22 LAB
CALCIUM SERPL-MCNC: 9.5 MG/DL
FSH SERPL-MCNC: 85 IU/L
LH SERPL-ACNC: 41.5 IU/L
PARATHYROID HORMONE INTACT: 19 PG/ML
PROLACTIN SERPL-MCNC: 10.6 NG/ML
TSH SERPL-ACNC: 1.12 UIU/ML

## 2022-01-22 NOTE — REASON FOR VISIT
[Initial Evaluation] : an initial evaluation [Osteoporosis] : osteoporosis [Family Member] : family member

## 2022-01-22 NOTE — CONSULT LETTER
[Dear  ___] : Dear  [unfilled], [Consult Letter:] : I had the pleasure of evaluating your patient, [unfilled]. [Please see my note below.] : Please see my note below. [Consult Closing:] : Thank you very much for allowing me to participate in the care of this patient.  If you have any questions, please do not hesitate to contact me. [Sincerely,] : Sincerely, [FreeTextEntry3] : Merly Murphy, DO\par Endocrinologist \par [facility]\par

## 2022-01-22 NOTE — REVIEW OF SYSTEMS
[As Noted in HPI] : as noted in HPI [Fatigue] : no fatigue [Decreased Appetite] : appetite not decreased [Recent Weight Gain (___ Lbs)] : no recent weight gain [Recent Weight Loss (___ Lbs)] : no recent weight loss [Fever] : no fever [Chills] : no chills [Visual Field Defect] : no visual field defect [Blurred Vision] : no blurred vision [Dysphagia] : no dysphagia [Neck Pain] : no neck pain [Hearing Loss] : no hearing loss  [Dysphonia] : no dysphonia [Nasal Congestion] : no nasal congestion [Chest Pain] : no chest pain [Slow Heart Rate] : heart rate is not slow [Leg Claudication] : no leg claudication [Palpitations] : no palpitations [Lower Ext Edema] : no lower extremity edema [Shortness Of Breath] : no shortness of breath [Cough] : no cough [Nausea] : no nausea [Constipation] : no constipation [Abdominal Pain] : no abdominal pain [Heartburn] : no heartburn [Vomiting] : no vomiting [Diarrhea] : no diarrhea [Gas/Bloating] : no gas/bloating [Joint Pain] : no joint pain [Muscle Weakness] : no muscle weakness [Acanthosis] : no acanthosis  [Acne] : no acne [Hair Loss] : no hair loss [Headaches] : no headaches [Dizziness] : no dizziness [Confusion] : no confusion [Difficulty Walking] : no difficulty walking [Tremors] : no tremors [Pain/Numbness of Digits] : no pain/numbness of digits [Poor Balance] : steady state balance

## 2022-01-22 NOTE — HISTORY OF PRESENT ILLNESS
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 \par \par Patient present with her daughter today. She h/o hospitalization for CVA and cirrhosis , DEXA screening and went to see PCP in May 2021 to reestablish care. As part of health care maintenance she was advised to do DXA scan for osteoporosis and was told she had osteoporosis and then recommended to see endocrinologist. Upon chart review, PCP noted patient is not candidate for bisphosphonates due to possible varices, however patient and her daughter stated today that she does not have any h/o known esophageal disorders including achalasia, strictures or varices, or GERD. She had colonoscopy which noted polyps but has not had endoscopy yet. She is not taking calcium over the counter supplementation due to urine analysis showing crystals, but no known h/o nephrolithiasis. She is taking vitamin D 2000IU mg daily. \par Prior to this, she had no known h/o osteoporosis or previous DXA scans.\par No chronic steroid use.\par \par PMH: as noted above \par PSH: bunionectomy, breast cyst excision\par Family Hx: DM - 2 sisters and brother,s daughter- thyroid cancer.  Mom - OTP dx in 90s. No h/o parental hip fracture \par Social Hx:  Denies tobacco,  ETOH, or illicit drug use \par ALL: NKDA\par Home Meds: MVI, vitamin D 2000IU mg daily. [Low Vit D Intake/Exposure] : no low vitamin D intake [Premat. Menopause (natural)] : no history of premature menopause [Premat. Menopause (surgery)] : no history of premature surgical menopause [Amenorrhea] : no amenorrhea [Disordered Eating] : no history of disordered eating [Taking Steroids] : no history of taking steroids [Hyperparathyroidism] : no history of hyperparathyroidism [Diabetes] : no history of diabetes [Testosterone Deficiency] : no testosterone deficiency [Kidney Stones] : no history of kidney stones [Excessive Alcohol Intake] : no excessive alcohol intake [Excessive Soda] : no excessive soda [Sedentary] : no sedentary lifestyle [Current Smoking___(ppd)] : not currently smoking [Previous Fragility Fracture] : no previous fragility fracture [Regular Dental Follow-Up] : no regular dental follow-up [Family History of Breast Cancer] : no family history of breast cancer [Family History of Osteoporosis] : no family history of osteoporosis [History of Radiation Therapy] : no history of radiation therapy [History of Blood Clots] : no history of blood clots [High Fall Risk] : no fall risk [Frequent Falls] : no frequent falls [Inability to Stand Straight] : no inability to stand straight [Loss of Height] : no loss of height [Loss of Balance] : no loss of balance [Change in Clothing Fit] : no change in clothing fit [Weight Gain] : no weight gain [Difficulty Ambulating] : no difficulty ambulating [Hip Pain] : no hip pain [Wrist Pain] : no wrist pain [Difficulty with Stairs] : no difficulty with stairs [Arthralgias] : no arthralgias [Myalgias] : no myalgias [New Fracture] : no new fracture

## 2022-01-22 NOTE — ASSESSMENT
[Denosumab Therapy] : Risks  and benefits of denosumab therapy were discussed with the patient including eczema, cellulitis, osteonecrosis of the jaw and atypical femur fractures [Bisphosphonate Therapy] : Risks and benefits of bisphosphonate therapy were  discussed with the patient including gastroesophageal irritation, osteonecrosis of the jaw, and atypical femur fractures, and acute phase reaction [FreeTextEntry1] : 1. Osteoporosis \par Bloodwork was collected in office today to evaluate for secondary causes of osteoporosis \par Patient has underlying h/o cirrhosis 2/2 ETOH/HepB, following with GI\par No known h/o varices, esophageal disorders or GERD\par Explained indications, benefits and potential side effects of oral/IV bisphosphonates as well as Prolia therapy.\par Will start trial of alendronate 70mg weekly first; explained to patient how to administer bisphosphonates medications.\par However recommended to f/u GI to confirm if no h/o esophageal varices. Will consider IV bisphosphonates or Prolia as alternative therapy if this is the case.\par \par Answered all questions; patient verbalized understanding of the above.\par RTC in 6 months

## 2022-01-22 NOTE — PHYSICAL EXAM
[Alert] : alert [No Acute Distress] : no acute distress [Well Developed] : well developed [Normal Sclera/Conjunctiva] : normal sclera/conjunctiva [EOMI] : extra ocular movement intact [No Proptosis] : no proptosis [No Lid Lag] : no lid lag [Normal Hearing] : hearing was normal [No LAD] : no lymphadenopathy [Supple] : the neck was supple [Thyroid Not Enlarged] : the thyroid was not enlarged [No Thyroid Nodules] : no palpable thyroid nodules [No Respiratory Distress] : no respiratory distress [No Accessory Muscle Use] : no accessory muscle use [Normal Rate and Effort] : normal respiratory rate and effort [Clear to Auscultation] : lungs were clear to auscultation bilaterally [Normal S1, S2] : normal S1 and S2 [No Murmurs] : no murmurs [Normal Rate] : heart rate was normal [Regular Rhythm] : with a regular rhythm [No Edema] : no peripheral edema [Normal Bowel Sounds] : normal bowel sounds [Not Tender] : non-tender [Not Distended] : not distended [Soft] : abdomen soft [No Stigmata of Cushings Syndrome] : no stigmata of Cushings Syndrome [Normal Gait] : normal gait [No Clubbing, Cyanosis] : no clubbing  or cyanosis of the fingernails [No Involuntary Movements] : no involuntary movements were seen [Normal Reflexes] : deep tendon reflexes were 2+ and symmetric [No Tremors] : no tremors [Oriented x3] : oriented to person, place, and time [Abdominal Striae] : no abdominal striae [Acanthosis Nigricans] : no acanthosis nigricans

## 2022-01-27 ENCOUNTER — APPOINTMENT (OUTPATIENT)
Dept: ENDOCRINOLOGY | Facility: CLINIC | Age: 67
End: 2022-01-27

## 2022-01-31 LAB
AFP-TM SERPL-MCNC: 4 NG/ML
ALBUMIN SERPL ELPH-MCNC: 3.7 G/DL
ALP BLD-CCNC: 238 U/L
ALT SERPL-CCNC: 30 U/L
ANION GAP SERPL CALC-SCNC: 15 MMOL/L
AST SERPL-CCNC: 53 U/L
BILIRUB SERPL-MCNC: 1.4 MG/DL
BUN SERPL-MCNC: 13 MG/DL
CALCIUM SERPL-MCNC: 9.4 MG/DL
CHLORIDE SERPL-SCNC: 107 MMOL/L
CO2 SERPL-SCNC: 22 MMOL/L
CREAT SERPL-MCNC: 0.82 MG/DL
GLUCOSE SERPL-MCNC: 85 MG/DL
HBV CORE IGG+IGM SER QL: REACTIVE
HBV CORE IGM SER QL: NONREACTIVE
HBV DNA # SERPL NAA+PROBE: NOT DETECTED IU/ML
HBV E AB SER QL: REACTIVE
HBV E AG SER QL: NONREACTIVE
HBV SURFACE AB SER QL: NONREACTIVE
HBV SURFACE AB SERPL IA-ACNC: <3 MIU/ML
HBV SURFACE AG SER QL: REACTIVE
HEPB DNA PCR LOG: NOT DETECTED LOG10IU/ML
POTASSIUM SERPL-SCNC: 3.9 MMOL/L
PROT SERPL-MCNC: 7.1 G/DL
SODIUM SERPL-SCNC: 143 MMOL/L

## 2022-04-04 NOTE — PHYSICAL THERAPY INITIAL EVALUATION ADULT - DISCHARGE DISPOSITION, PT EVAL
home/no skilled PT needs CONSTITUTIONAL: Well-developed; well-nourished; in no acute distress.   SKIN: warm, dry  HEAD: Normocephalic; atraumatic.  EYES: no conjunctival injection. EOMI.   ENT: No nasal discharge; airway clear.  NECK: Supple.  CARD: S1, S2 normal; Regular rate and rhythm.   RESP: No wheezes, rales or rhonchi.  ABD: soft nondistended, +LLQ TTP, no CVA TTP.   RECTAL:   EXT: Normal ROM.  No lower extremity edema.   NEURO: Alert, oriented, grossly unremarkable. No FND.   PSYCH: Cooperative, appropriate. CONSTITUTIONAL: Well-developed; well-nourished; in no acute distress.   SKIN: warm, dry  HEAD: Normocephalic; atraumatic.  EYES: no conjunctival injection. EOMI.   ENT: No nasal discharge; airway clear.  NECK: Supple.  CARD: S1, S2 normal; Regular rate and rhythm.   RESP: No wheezes, rales or rhonchi.  ABD: soft nondistended, +LLQ TTP, no CVA TTP.   RECTAL: nontender, no masses or fissures, no blood   EXT: Normal ROM.  No lower extremity edema.   NEURO: Alert, oriented, grossly unremarkable. No FND.   PSYCH: Cooperative, appropriate.

## 2022-04-23 NOTE — ED ADULT NURSE NOTE - SUICIDE SCREENING QUESTION 2
No Risk Stratification for hip fracture:  Based on RCRI index, pt is class 0 risk, with 3.9 % 30-day risk of death, MI, or cardiac arrest.  Given advanced age and possible COVID infection, pt is considered low to moderate risk for this necessary intermediate risk surgery. Risk Stratification for hip fracture:  Based on RCRI index, pt is class 0 risk, with 3.9 % 30-day risk of death, MI, or cardiac arrest.  Given advanced age and possible COVID infection, pt is considered low to moderate risk for this necessary intermediate risk surgery.      Discussed with son at bedside.

## 2022-06-28 ENCOUNTER — RX RENEWAL (OUTPATIENT)
Age: 67
End: 2022-06-28

## 2022-07-12 ENCOUNTER — TRANSCRIPTION ENCOUNTER (OUTPATIENT)
Age: 67
End: 2022-07-12

## 2022-07-22 ENCOUNTER — APPOINTMENT (OUTPATIENT)
Dept: ENDOCRINOLOGY | Facility: CLINIC | Age: 67
End: 2022-07-22

## 2022-07-22 PROCEDURE — 99213 OFFICE O/P EST LOW 20 MIN: CPT | Mod: 95

## 2022-07-25 NOTE — ASSESSMENT
[Denosumab Therapy] : Risks  and benefits of denosumab therapy were discussed with the patient including eczema, cellulitis, osteonecrosis of the jaw and atypical femur fractures [Bisphosphonate Therapy] : Risks and benefits of bisphosphonate therapy were  discussed with the patient including gastroesophageal irritation, osteonecrosis of the jaw, and atypical femur fractures, and acute phase reaction [FreeTextEntry1] : 1. Osteoporosis \par Reviewed secondary causes for OTP bloodwork, prolacitn, TFTs, PTH normal in Jan 2022.\par Patient has underlying h/o cirrhosis 2/2 ETOH/HepB, following with GI\par No known h/o varices, esophageal disorders or GERD\par No h/o falls, fractures or bone pain since last visit\par Continue on Alendronate 70mg weekly, patient notes she is tolerating it well, denies h/o esophageal varices or GERD. Has f/u with GI soon as per patient. \par Continue with vitamin D supplementation.\par \par Answered all questions; patient verbalized understanding of the above.\par RTC in 6 months

## 2022-07-25 NOTE — HISTORY OF PRESENT ILLNESS
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 She h/o hospitalization for CVA and cirrhosis , DEXA screening and went to see PCP in May 2021 to reestablish care. At last endocrinology visit in Jan 2022, noted new diagnosis of osteoporosis and it was noted upon chart review, PCP noted patient is not candidate for bisphosphonates due to possible varices, however patient and her daughter stated today that she does not have any h/o known esophageal disorders including achalasia, strictures or varices, or GERD. She had colonoscopy which noted polyps but has not had endoscopy yet. She is not taking calcium over the counter supplementation due to urine analysis showing crystals, but no known h/o nephrolithiasis. She is taking vitamin D 2000IU mg daily. \par Prior to this, she had no known h/o osteoporosis or previous DXA scans.\par She was started on alendronate 70mg weekly and was instructed to f/u GI if any esophageal varices were reported on previous workup. She has not seen GI since last endocrine visit but notes follow up soon.\par \par She moved to [place] and noted she is tolerating Alendronate well; she states she has no issues with acid reflex \par Home Meds: Alendronate 70mg weekly, MVI, vitamin D 2000IU mg daily.\par \par [id number] [place], [place] [Low Vit D Intake/Exposure] : no low vitamin D intake [Premat. Menopause (natural)] : no history of premature menopause [Premat. Menopause (surgery)] : no history of premature surgical menopause [Amenorrhea] : no amenorrhea [Disordered Eating] : no history of disordered eating [Taking Steroids] : no history of taking steroids [Hyperparathyroidism] : no history of hyperparathyroidism [Diabetes] : no history of diabetes [Testosterone Deficiency] : no testosterone deficiency [Kidney Stones] : no history of kidney stones [Excessive Alcohol Intake] : no excessive alcohol intake [Excessive Soda] : no excessive soda [Sedentary] : no sedentary lifestyle [Current Smoking___(ppd)] : not currently smoking [Previous Fragility Fracture] : no previous fragility fracture [Regular Dental Follow-Up] : no regular dental follow-up [Family History of Breast Cancer] : no family history of breast cancer [Family History of Osteoporosis] : no family history of osteoporosis [History of Radiation Therapy] : no history of radiation therapy [History of Blood Clots] : no history of blood clots [High Fall Risk] : no fall risk [Frequent Falls] : no frequent falls [Inability to Stand Straight] : no inability to stand straight [Loss of Height] : no loss of height [Loss of Balance] : no loss of balance [Change in Clothing Fit] : no change in clothing fit [Weight Gain] : no weight gain [Difficulty Ambulating] : no difficulty ambulating [Hip Pain] : no hip pain [Wrist Pain] : no wrist pain [Difficulty with Stairs] : no difficulty with stairs [Arthralgias] : no arthralgias [Myalgias] : no myalgias [New Fracture] : no new fracture

## 2022-09-06 ENCOUNTER — APPOINTMENT (OUTPATIENT)
Dept: INTERNAL MEDICINE | Facility: CLINIC | Age: 67
End: 2022-09-06

## 2022-09-06 DIAGNOSIS — M81.0 AGE-RELATED OSTEOPOROSIS W/OUT CURRENT PATHOLOGICAL FRACTURE: ICD-10-CM

## 2022-09-06 DIAGNOSIS — D68.9 COAGULATION DEFECT, UNSPECIFIED: ICD-10-CM

## 2022-09-06 DIAGNOSIS — Z22.7 LATENT TUBERCULOSIS: ICD-10-CM

## 2022-09-06 DIAGNOSIS — R91.1 SOLITARY PULMONARY NODULE: ICD-10-CM

## 2022-09-06 DIAGNOSIS — Z00.00 ENCOUNTER FOR GENERAL ADULT MEDICAL EXAMINATION W/OUT ABNORMAL FINDINGS: ICD-10-CM

## 2022-09-06 DIAGNOSIS — K74.60 UNSPECIFIED CIRRHOSIS OF LIVER: ICD-10-CM

## 2022-09-06 DIAGNOSIS — R94.01 ABNORMAL ELECTROENCEPHALOGRAM [EEG]: ICD-10-CM

## 2022-09-06 DIAGNOSIS — I61.9 NONTRAUMATIC INTRACEREBRAL HEMORRHAGE, UNSPECIFIED: ICD-10-CM

## 2022-09-06 PROCEDURE — G0444 DEPRESSION SCREEN ANNUAL: CPT | Mod: 95

## 2022-09-06 PROCEDURE — G0439: CPT | Mod: 95

## 2022-09-06 RX ORDER — CHLORHEXIDINE GLUCONATE, 0.12% ORAL RINSE 1.2 MG/ML
0.12 SOLUTION DENTAL
Qty: 473 | Refills: 0 | Status: DISCONTINUED | COMMUNITY
Start: 2021-07-20 | End: 2022-09-06

## 2022-09-06 RX ORDER — MIRTAZAPINE 15 MG/1
15 TABLET, FILM COATED ORAL
Qty: 15 | Refills: 0 | Status: DISCONTINUED | COMMUNITY
Start: 2022-03-23

## 2022-09-06 NOTE — PHYSICAL EXAM
[No Acute Distress] : no acute distress [Well Nourished] : well nourished [Well Developed] : well developed [Normal Sclera/Conjunctiva] : normal sclera/conjunctiva [Normal Outer Ear/Nose] : the outer ears and nose were normal in appearance [No Lymphadenopathy] : no lymphadenopathy [Supple] : supple [No Respiratory Distress] : no respiratory distress  [No Accessory Muscle Use] : no accessory muscle use [Clear to Auscultation] : lungs were clear to auscultation bilaterally [Normal Rate] : normal rate  [Regular Rhythm] : with a regular rhythm [Normal S1, S2] : normal S1 and S2 [No Murmur] : no murmur heard [Pedal Pulses Present] : the pedal pulses are present [No Edema] : there was no peripheral edema [No Extremity Clubbing/Cyanosis] : no extremity clubbing/cyanosis [Soft] : abdomen soft [Non Tender] : non-tender [Non-distended] : non-distended [Normal Bowel Sounds] : normal bowel sounds [Normal Posterior Cervical Nodes] : no posterior cervical lymphadenopathy [Normal Anterior Cervical Nodes] : no anterior cervical lymphadenopathy [No CVA Tenderness] : no CVA  tenderness [No Spinal Tenderness] : no spinal tenderness [No Joint Swelling] : no joint swelling [Grossly Normal Strength/Tone] : grossly normal strength/tone [No Rash] : no rash [Coordination Grossly Intact] : coordination grossly intact [No Focal Deficits] : no focal deficits [Normal Gait] : normal gait [Normal Affect] : the affect was normal [Normal Insight/Judgement] : insight and judgment were intact [Comprehensive Foot Exam Normal] : Right and left foot were examined and both feet are normal. No ulcers in either foot. Toes are normal and with full ROM.  Normal tactile sensation with monofilament testing throughout both feet

## 2022-09-06 NOTE — HEALTH RISK ASSESSMENT
[No] : In the past 12 months have you used drugs other than those required for medical reasons? No [No falls in past year] : Patient reported no falls in the past year [0] : 2) Feeling down, depressed, or hopeless: Not at all (0) [Patient reported mammogram was normal] : Patient reported mammogram was normal [Patient reported colonoscopy was normal] : Patient reported colonoscopy was normal [HIV Test offered] : HIV Test offered [Hepatitis C test offered] : Hepatitis C test offered [None] : None [With Family] : lives with family [Retired] : retired [Single] : single [Feels Safe at Home] : Feels safe at home [Fully functional (bathing, dressing, toileting, transferring, walking, feeding)] : Fully functional (bathing, dressing, toileting, transferring, walking, feeding) [Fully functional (using the telephone, shopping, preparing meals, housekeeping, doing laundry, using] : Fully functional and needs no help or supervision to perform IADLs (using the telephone, shopping, preparing meals, housekeeping, doing laundry, using transportation, managing medications and managing finances) [Smoke Detector] : smoke detector [Carbon Monoxide Detector] : carbon monoxide detector [Seat Belt] :  uses seat belt [Sunscreen] : uses sunscreen [Very Good] : ~his/her~  mood as very good [Never] : Never [PHQ-2 Negative - No further assessment needed] : PHQ-2 Negative - No further assessment needed [de-identified] : walking [de-identified] : balanced [[id number]] : 0 [Reports changes in hearing] : Reports no changes in hearing [Reports changes in vision] : Reports no changes in vision [MammogramDate] : 04/21 [ColonoscopyDate] : 01/16 [HIVDate] : 06/21 [HIVComments] : Non reactive [HepatitisCDate] : 06/21 [HepatitisCComments] : Non reactive [Patient/Caregiver not ready to engage] : , patient/caregiver not ready to engage

## 2022-09-06 NOTE — HISTORY OF PRESENT ILLNESS
[Home] : at home, [unfilled] , at the time of the visit. [Medical Office: ([facility])___] : at the medical office located in  [Family Member] : family member [Verbal consent obtained from patient] : the patient, [unfilled] [FreeTextEntry1] : Ms. GRISELDA MENDOZA is a 66 year old woman with pmhx of chronic hepatitis B, cirrhosis F4, , recent ICH from coagulopathy, Glaucoma, osteoporosis, pulm nodule, positive quant gold who presents  [de-identified] : Ms. GRISELDA MENDOZA is a 66 year old woman with pmhx of chronic hepatitis B, cirrhosis F4, hx of ICH from coagulopathy, Glaucoma, osteoporosis, pulm nodule, positive quant gold who presents for annual wellness visit. She is accompanied by daughter. She endorses being in good mood and health. She denied any complaints. She is takign medications as prescribed. She is due for repeat ct scan of the chest and mammography. She is due for Pneumovax 23.

## 2022-09-26 ENCOUNTER — RX RENEWAL (OUTPATIENT)
Age: 67
End: 2022-09-26

## 2022-10-04 ENCOUNTER — NON-APPOINTMENT (OUTPATIENT)
Age: 67
End: 2022-10-04

## 2022-10-04 DIAGNOSIS — N64.89 OTHER SPECIFIED DISORDERS OF BREAST: ICD-10-CM

## 2022-10-04 DIAGNOSIS — Z12.39 ENCOUNTER FOR OTHER SCREENING FOR MALIGNANT NEOPLASM OF BREAST: ICD-10-CM

## 2022-12-20 ENCOUNTER — TRANSCRIPTION ENCOUNTER (OUTPATIENT)
Age: 67
End: 2022-12-20

## 2022-12-29 ENCOUNTER — APPOINTMENT (OUTPATIENT)
Dept: GASTROENTEROLOGY | Facility: CLINIC | Age: 67
End: 2022-12-29
Payer: MEDICARE

## 2022-12-29 ENCOUNTER — APPOINTMENT (OUTPATIENT)
Dept: GASTROENTEROLOGY | Facility: CLINIC | Age: 67
End: 2022-12-29

## 2022-12-29 DIAGNOSIS — R74.01 ELEVATION OF LEVELS OF LIVER TRANSAMINASE LEVELS: ICD-10-CM

## 2022-12-29 DIAGNOSIS — B18.1 CHRONIC VIRAL HEPATITIS B W/OUT DELTA-AGENT: ICD-10-CM

## 2022-12-29 DIAGNOSIS — D69.6 THROMBOCYTOPENIA, UNSPECIFIED: ICD-10-CM

## 2022-12-29 PROCEDURE — 99214 OFFICE O/P EST MOD 30 MIN: CPT | Mod: 95

## 2022-12-29 NOTE — HISTORY OF PRESENT ILLNESS
[Home] : at home, [unfilled] , at the time of the visit. [Medical Office: ([facility])___] : at the medical office located in  [Verbal consent obtained from patient] : the patient, [unfilled] [de-identified] : Hep B - DNA - negative\par \par Fibroscan- F4\par \par \par MR  wit No Cirrhosis - No suspicious lesions \par \par 3 mm uncinate cyst - rec 1 yr imaging \par \par \par ETOH Liver Dz \par \par Not drinking now

## 2022-12-29 NOTE — ASSESSMENT
[FreeTextEntry1] : Cont Rx\par \par Hepatology F/U - names given again \par \par \par Had Colon \par \par \par Sono 6 mo\par \par MR in 1 year \par \par I spent 35 minutes reviewing the patients records prior to arrival, with patient , and reviewing records after visit. All prior testing reviewed at length. All questions were answered.\par \par \par

## 2023-03-20 ENCOUNTER — TRANSCRIPTION ENCOUNTER (OUTPATIENT)
Age: 68
End: 2023-03-20

## 2023-03-29 ENCOUNTER — RX RENEWAL (OUTPATIENT)
Age: 68
End: 2023-03-29

## 2023-05-18 NOTE — H&P ADULT - NSHPLABSRESULTS_GEN_ALL_CORE
used Personally reviewed labs.   Personally reviewed imaging.   Personally reviewed EKG.                           13.6   6.37  )-----------( 151      ( 28 May 2021 17:53 )             40.2           139  |  102  |  6<L>  ----------------------------<  109<H>  3.3<L>   |  26  |  0.59    Ca    9.0      28 May 2021 19:45  Phos  2.4       Mg     1.7         TPro  7.5  /  Alb  3.2<L>  /  TBili  4.2<H>  /  DBili  2.2<H>  /  AST  66<H>  /  ALT  37<H>  /  AlkPhos  104              LIVER FUNCTIONS - ( 28 May 2021 19:45 )  Alb: 3.2 g/dL / Pro: 7.5 g/dL / ALK PHOS: 104 U/L / ALT: 37 U/L / AST: 66 U/L / GGT: x             PT/INR - ( 28 May 2021 21:00 )   PT: 21.9 sec;   INR: 1.97 ratio         PTT - ( 28 May 2021 21:00 )  PTT:40.9 sec    Urinalysis Basic - ( 28 May 2021 17:53 )    Color: Terese / Appearance: Slightly Turbid / S.023 / pH: x  Gluc: x / Ketone: Negative  / Bili: Small / Urobili: 12 mg/dL   Blood: x / Protein: 30 mg/dL / Nitrite: Negative   Leuk Esterase: Negative / RBC: 3 /HPF / WBC 3 /HPF   Sq Epi: x / Non Sq Epi: 3 /HPF / Bacteria: Few        < from: CT Angio Neck w/ IV Cont (21 @ 23:57) >    IMPRESSION:    CT brain: Unchanged left occipital acute intraparenchymal hemorrhage. No active bleeding.  < from: CT Head No Cont (21 @ 19:53) >      EXAM:  CT BRAIN        PROCEDURE DATE:  May 28 2021   IMPRESSION:    2.4 x 3.0 x 2.4 cm left occipital acute intraparenchymal hemorrhage with associated vasogenic edema and mass effect as described. There is no midline shift.    < end of copied text >      CT angiography neck: No hemodynamically significant stenosis by NASCET criteria. No vascular dissection.    CT angiography brain: No major vessel occlusion or proximal stenosis. No aneurysm or vascular malformation.  EXAM:  CT ANGIO NECK (W)AW IC      EXAM:  CT ANGIO BRAIN (W)AW IC        PROCEDURE DATE:  May 28 2021     < end of copied text >    < from: CT Chest w/ IV Cont (21 @ 19:53) >    EXAM:  CT ABDOMEN AND PELVIS IC      EXAM:  CT CHEST IC        PROCEDURE DATE:  May 28 2021   IMPRESSION:  Indeterminate infiltrative liver mass; MRI recommended.    < end of copied text > Personally reviewed labs.   Personally reviewed imaging.   Personally reviewed EKG.                           13.6   6.37  )-----------( 151      ( 28 May 2021 17:53 )             40.2           139  |  102  |  6<L>  ----------------------------<  109<H>  3.3<L>   |  26  |  0.59    Ca    9.0      28 May 2021 19:45  Phos  2.4       Mg     1.7         TPro  7.5  /  Alb  3.2<L>  /  TBili  4.2<H>  /  DBili  2.2<H>  /  AST  66<H>  /  ALT  37<H>  /  AlkPhos  104        LIVER FUNCTIONS - ( 28 May 2021 19:45 )  Alb: 3.2 g/dL / Pro: 7.5 g/dL / ALK PHOS: 104 U/L / ALT: 37 U/L / AST: 66 U/L / GGT: x           PT/INR - ( 28 May 2021 21:00 )   PT: 21.9 sec;   INR: 1.97 ratio    PTT - ( 28 May 2021 21:00 )  PTT:40.9 sec    Urinalysis Basic - ( 28 May 2021 17:53 )  Color: Terese / Appearance: Slightly Turbid / S.023 / pH: x  Gluc: x / Ketone: Negative  / Bili: Small / Urobili: 12 mg/dL   Blood: x / Protein: 30 mg/dL / Nitrite: Negative   Leuk Esterase: Negative / RBC: 3 /HPF / WBC 3 /HPF   Sq Epi: x / Non Sq Epi: 3 /HPF / Bacteria: Few        < from: CT Angio Neck w/ IV Cont (21 @ 23:57) >    IMPRESSION:    CT brain: Unchanged left occipital acute intraparenchymal hemorrhage. No active bleeding.  < from: CT Head No Cont (21 @ 19:53) >      EXAM:  CT BRAIN        PROCEDURE DATE:  May 28 2021   IMPRESSION:    2.4 x 3.0 x 2.4 cm left occipital acute intraparenchymal hemorrhage with associated vasogenic edema and mass effect as described. There is no midline shift.    < end of copied text >      CT angiography neck: No hemodynamically significant stenosis by NASCET criteria. No vascular dissection.    CT angiography brain: No major vessel occlusion or proximal stenosis. No aneurysm or vascular malformation  < end of copied text >      < from: CT Chest w/ IV Cont (21 @ 19:53) >    EXAM:  CT ABDOMEN AND PELVIS IC    EXAM:  CT CHEST IC    PROCEDURE DATE:  May 28 2021     IMPRESSION:  Indeterminate infiltrative liver mass; MRI recommended.    < end of copied text >

## 2023-05-22 RX ORDER — TENOFOVIR DISOPROXIL FUMARATE 300 MG/1
300 TABLET ORAL
Qty: 90 | Refills: 3 | Status: ACTIVE | COMMUNITY
Start: 2021-06-13 | End: 1900-01-01

## 2023-06-20 ENCOUNTER — TRANSCRIPTION ENCOUNTER (OUTPATIENT)
Age: 68
End: 2023-06-20

## 2023-06-20 RX ORDER — ALENDRONATE SODIUM 70 MG/1
70 TABLET ORAL
Qty: 12 | Refills: 0 | Status: ACTIVE | COMMUNITY
Start: 2022-01-21 | End: 1900-01-01

## 2023-08-14 ENCOUNTER — APPOINTMENT (OUTPATIENT)
Dept: PULMONOLOGY | Facility: CLINIC | Age: 68
End: 2023-08-14

## 2023-08-28 ENCOUNTER — TRANSCRIPTION ENCOUNTER (OUTPATIENT)
Age: 68
End: 2023-08-28

## 2023-08-29 ENCOUNTER — TRANSCRIPTION ENCOUNTER (OUTPATIENT)
Age: 68
End: 2023-08-29

## 2024-04-13 NOTE — PROGRESS NOTE ADULT - ASSESSMENT
Problem: At Risk for Falls  Goal: Patient does not fall  Outcome: Monitoring/Evaluating progress  Goal: Patient takes action to control fall-related risks  Outcome: Monitoring/Evaluating progress     Problem: At Risk for Injury Due to Fall  Goal: Patient does not fall  Outcome: Monitoring/Evaluating progress  Goal: Takes action to control condition specific risks  Outcome: Monitoring/Evaluating progress  Goal: Verbalizes understanding of fall-related injury personal risks  Description: Document education using the patient education activity  Outcome: Monitoring/Evaluating progress     Problem: Diabetes  Goal: Achieves glycemic balance  Description: Goal is to maintain blood sugar within range with no episodes of hypoglycemia  Outcome: Monitoring/Evaluating progress  Goal: Verbalizes/demonstrates understanding of NEW diagnosis of diabetes and management  Description: Document on Patient Education Activity  Outcome: Monitoring/Evaluating progress  Goal: Verbalizes understanding of diabetes management including how to use HbA1C to evaluate status of blood sugar over time (Diabetes is NOT a new diagnosis)  Description: Diabetes Education  Outcome: Monitoring/Evaluating progress      66 yo RH AAF p/w HA and vision changes.  found to have L occipital IPH. on exam with R HHA and mild R facial  repeat CTH stable 5/29. also found to have liver mass. INR was ~2 on arrival s/p vit K. now on keppra. CTA H/N neg, TTE as above, A1c 5%, LDL 93mg/dL. EEG prelim neg   Etiology of IPH may be 2/2 HTN, mets, CAA, coagulaopathy from liver disease, needs further workup. + ETOH abuse per family   - MRI brain w/ and w/o pending   - MRI orbits  - no role for steroids from neurostandpoint  unless mass found intracranially   - SBP<160  - on keppra 500mg BID but no seizures noted. no need for AED unless has seizure  - cancer workup given pulmonary nodules and liver mass.  hepatology following.   - PT/OT  - thiamine/folate  - hepatology recs appreciated, HCC workup in progress, chronic hep B  - tele   - check FS, glucose control <180  - GI/DVT ppx  - Counseling on diet, exercise, and medication adherence was done  - Counseling on smoking cessation and alcohol consumption offered when appropriate.  - Pain assessed and judicious use of narcotics when appropriate was discussed.    - Stroke education given when appropriate.  - Importance of fall prevention discussed.   - Differential diagnosis and plan of care discussed with patient and/or family and primary team  - Thank you for allowing me to participate in the care of this patient. Call with questions.   Suleman Valadez MD  Vascular Neurology .

## 2025-06-25 NOTE — CONSULT NOTE ADULT - SUBJECTIVE AND OBJECTIVE BOX
p ([id number])     HPI:  65F no PMH Sclera in eyes changing color from white 2 weeks ago to yellow now, lost vision 2 weeks ago now back , pressure in head like slight Ha last yesterday, stomach pain diffusely, sweating profusely last night, rapid weight loss from 3 months despite normal appetite till now daughter estimates about 20 lbs loss. No  n/v/d/f/c. No hematuria, dysuria, hematochezia. Daughter endorses new onset generalized weakness and needing some assistance walking that was not present 2 weeks ago.    Imaging:  CTHJ with acute IPH 2.3 cm. Stable on 4 hrs. CTA negative     Exam:  AOx3, FC, PERRL, EOMI, no facial, RHH (Reye > L)    5/5 throughout, no drift  SILT  no clonus    --Anticoagulation:  none  =====================  PAST MEDICAL HISTORY     PAST SURGICAL HISTORY         MEDICATIONS:  Antibiotics:    Neuro:    Other:      SOCIAL HISTORY:   Occupation:   Marital Status:     FAMILY HISTORY:      ROS: Negative except per HPI    LABS:  PT/INR - ( 28 May 2021 21:00 )   PT: 21.9 sec;   INR: 1.97 ratio         PTT - ( 28 May 2021 21:00 )  PTT:40.9 sec                        13.6   6.37  )-----------( 151      ( 28 May 2021 17:53 )             40.2     05-28    139  |  102  |  6<L>  ----------------------------<  109<H>  3.3<L>   |  26  |  0.59    Ca    9.0      28 May 2021 19:45  Phos  2.4     05-28  Mg     1.7     05-28    TPro  7.5  /  Alb  3.2<L>  /  TBili  4.2<H>  /  DBili  2.2<H>  /  AST  66<H>  /  ALT  37<H>  /  AlkPhos  104  05-28       Positive reinforcement